# Patient Record
Sex: FEMALE | Race: BLACK OR AFRICAN AMERICAN | NOT HISPANIC OR LATINO | Employment: UNEMPLOYED
[De-identification: names, ages, dates, MRNs, and addresses within clinical notes are randomized per-mention and may not be internally consistent; named-entity substitution may affect disease eponyms.]

---

## 2017-09-17 ENCOUNTER — HEALTH MAINTENANCE LETTER (OUTPATIENT)
Age: 22
End: 2017-09-17

## 2019-04-25 ENCOUNTER — TRANSFERRED RECORDS (OUTPATIENT)
Dept: HEALTH INFORMATION MANAGEMENT | Facility: CLINIC | Age: 24
End: 2019-04-25

## 2019-04-25 LAB — PAP SMEAR - HIM PATIENT REPORTED: NEGATIVE

## 2019-06-05 ENCOUNTER — TELEPHONE (OUTPATIENT)
Dept: OBGYN | Facility: CLINIC | Age: 24
End: 2019-06-05

## 2019-06-05 NOTE — TELEPHONE ENCOUNTER
Pt no showed NPN nurse visit today.    Has appt with Dr Delgadillo scheduled for 6/10/19.     We have not received records as far as I know.    Please call her to reschedule nurse visit, and we HAVE to get records prior or at visit.    Mari WANG R.N.  [facility]

## 2019-06-13 ENCOUNTER — TELEPHONE (OUTPATIENT)
Dept: OBGYN | Facility: CLINIC | Age: 24
End: 2019-06-13

## 2019-06-13 ENCOUNTER — TRANSFERRED RECORDS (OUTPATIENT)
Dept: HEALTH INFORMATION MANAGEMENT | Facility: CLINIC | Age: 24
End: 2019-06-13

## 2019-06-13 DIAGNOSIS — B20 HUMAN IMMUNODEFICIENCY VIRUS (HIV) DISEASE (H): Primary | ICD-10-CM

## 2019-06-13 DIAGNOSIS — Z33.1 PREGNANT STATE, INCIDENTAL: ICD-10-CM

## 2019-06-13 RX ORDER — ABACAVIR AND LAMIVUDINE 600; 300 MG/1; MG/1
1 TABLET, FILM COATED ORAL DAILY
Qty: 90 TABLET | Refills: 1 | Status: SHIPPED | OUTPATIENT
Start: 2019-06-13 | End: 2019-06-27

## 2019-06-13 NOTE — TELEPHONE ENCOUNTER
Referral placed. Left voicemail for patient to call back as soon as possible.    Melany Hoyos RN

## 2019-06-13 NOTE — TELEPHONE ENCOUNTER
Pt called back. Discussed in length with her about her upcoming appts with us, and the importance of her getting in to see the infectious disease doctors ASAP. The pt will call the clinic and let us know if she is not able to get in within a week.    Pt also says she will call her old OB clinic to get her records faxed to us. Gave her the fax number for the [facility] clinic. Told her we NEED those records in order to see her at her nurse visit next week. She is agreeable. Will watch for those records. Routed this encounter to Joycelyn to let her know.        Melany Hoyos RN

## 2019-06-13 NOTE — TELEPHONE ENCOUNTER
RN from [place] infectious disease [facility] calling stating this pt is HIV positive. She is seeing us for her NPN nurse visit next week. The pt has been taking medications for this, but just ran out. It was previously prescribed by her infectious disease doctor in [place]. Her doctor tried to send it to [facility] in [place], but that provider is now out of her insurance network. The RN is hoping her OB can start prescribing this medication for her since she has ran out. Medication orders pended as told by the infectious disease RN.    The pt took Isentris in the first trimester instead of Tivicay as Tivicay is contraindicated in the first trimester. Per the RN, the pt is about 21 weeks now.     Pt was diagnosed with HIV in 6/2016.    The RN will be faxing the pt's infectious disease records to our [place] clinic.        Melany Hoyos RN

## 2019-06-13 NOTE — TELEPHONE ENCOUNTER
This patient needs to establish with HIV clinic at the [facility]. Please get her an appointment right away.    Melanie Andres MD

## 2019-06-13 NOTE — TELEPHONE ENCOUNTER
Please also make sure that an HIV RNA viral load is added to her routine prenatal labs. Thanks.  Melanie Andres MD

## 2019-06-17 PROBLEM — O09.899 HIV (HUMAN IMMUNODEFICIENCY VIRUS) RISK FACTORS COMPLICATING PREGNANCY: Status: ACTIVE | Noted: 2019-06-17

## 2019-06-18 NOTE — TELEPHONE ENCOUNTER
Has nurse appt tomorrow.    I may order all OB labs as we got some records but nothing OB related.  Since pt is somewhat non-complaint with appts, it may be easier to just get all her labs and send her for an ultrasound.    Should I defer from ordering HIV lab as we know she is pos, just order the RNA  viral load?    Mari WANG R.N.  [facility]

## 2019-06-19 ENCOUNTER — PRENATAL OFFICE VISIT (OUTPATIENT)
Dept: NURSING | Facility: CLINIC | Age: 24
End: 2019-06-19
Payer: MEDICAID

## 2019-06-19 VITALS
HEIGHT: 68 IN | WEIGHT: 178.9 LBS | SYSTOLIC BLOOD PRESSURE: 102 MMHG | DIASTOLIC BLOOD PRESSURE: 62 MMHG | BODY MASS INDEX: 27.11 KG/M2

## 2019-06-19 DIAGNOSIS — Z34.00 SUPERVISION OF NORMAL FIRST PREGNANCY: Primary | ICD-10-CM

## 2019-06-19 DIAGNOSIS — O09.899: ICD-10-CM

## 2019-06-19 LAB
ABO + RH BLD: NORMAL
ABO + RH BLD: NORMAL
BLD GP AB SCN SERPL QL: NORMAL
BLOOD BANK CMNT PATIENT-IMP: NORMAL
ERYTHROCYTE [DISTWIDTH] IN BLOOD BY AUTOMATED COUNT: 13.6 % (ref 10–15)
HCG UR QL: POSITIVE
HCT VFR BLD AUTO: 30.1 % (ref 35–47)
HGB BLD-MCNC: 10.2 G/DL (ref 11.7–15.7)
MCH RBC QN AUTO: 28.6 PG (ref 26.5–33)
MCHC RBC AUTO-ENTMCNC: 33.9 G/DL (ref 31.5–36.5)
MCV RBC AUTO: 84 FL (ref 78–100)
PLATELET # BLD AUTO: 283 10E9/L (ref 150–450)
RBC # BLD AUTO: 3.57 10E12/L (ref 3.8–5.2)
SPECIMEN EXP DATE BLD: NORMAL
WBC # BLD AUTO: 6.2 10E9/L (ref 4–11)

## 2019-06-19 PROCEDURE — 86900 BLOOD TYPING SEROLOGIC ABO: CPT | Performed by: OBSTETRICS & GYNECOLOGY

## 2019-06-19 PROCEDURE — 86701 HIV-1ANTIBODY: CPT | Performed by: OBSTETRICS & GYNECOLOGY

## 2019-06-19 PROCEDURE — 86901 BLOOD TYPING SEROLOGIC RH(D): CPT | Performed by: OBSTETRICS & GYNECOLOGY

## 2019-06-19 PROCEDURE — 81025 URINE PREGNANCY TEST: CPT | Performed by: OBSTETRICS & GYNECOLOGY

## 2019-06-19 PROCEDURE — 85027 COMPLETE CBC AUTOMATED: CPT | Performed by: OBSTETRICS & GYNECOLOGY

## 2019-06-19 PROCEDURE — 86850 RBC ANTIBODY SCREEN: CPT | Performed by: OBSTETRICS & GYNECOLOGY

## 2019-06-19 PROCEDURE — 87389 HIV-1 AG W/HIV-1&-2 AB AG IA: CPT | Performed by: OBSTETRICS & GYNECOLOGY

## 2019-06-19 PROCEDURE — 99207 ZZC NO CHARGE NURSE ONLY: CPT

## 2019-06-19 PROCEDURE — 86702 HIV-2 ANTIBODY: CPT | Performed by: OBSTETRICS & GYNECOLOGY

## 2019-06-19 PROCEDURE — 87536 HIV-1 QUANT&REVRSE TRNSCRPJ: CPT | Performed by: OBSTETRICS & GYNECOLOGY

## 2019-06-19 PROCEDURE — 87086 URINE CULTURE/COLONY COUNT: CPT | Performed by: OBSTETRICS & GYNECOLOGY

## 2019-06-19 PROCEDURE — 86762 RUBELLA ANTIBODY: CPT | Mod: XU | Performed by: OBSTETRICS & GYNECOLOGY

## 2019-06-19 PROCEDURE — G0499 HEPB SCREEN HIGH RISK INDIV: HCPCS | Performed by: OBSTETRICS & GYNECOLOGY

## 2019-06-19 PROCEDURE — 36415 COLL VENOUS BLD VENIPUNCTURE: CPT | Performed by: OBSTETRICS & GYNECOLOGY

## 2019-06-19 PROCEDURE — 86780 TREPONEMA PALLIDUM: CPT | Performed by: OBSTETRICS & GYNECOLOGY

## 2019-06-19 RX ORDER — LANOLIN ALCOHOL/MO/W.PET/CERES
400 CREAM (GRAM) TOPICAL DAILY
Qty: 100 TABLET | Refills: 3 | Status: SHIPPED | OUTPATIENT
Start: 2019-06-19 | End: 2020-01-03

## 2019-06-19 ASSESSMENT — MIFFLIN-ST. JEOR: SCORE: 1609.99

## 2019-06-19 NOTE — TELEPHONE ENCOUNTER
Advise Pt that virtually all PNVs have Ca and/or Fe so I sent Rx Folic acid 400 mcg daily to her pharm, since that is the most important vitamin in pregnancy.  We can address anemia later on if results in Hgb < 10.
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
## 2019-06-19 NOTE — TELEPHONE ENCOUNTER
Since we don't yet have any records, go ahead and order all the OB labs, including the regular HIV as well as the HIV viral load, and the U/S.

## 2019-06-19 NOTE — TELEPHONE ENCOUNTER
Pt wanted me to send PNV to pharmacy.  When I added order for one with iron it mentions that iron can lower levels of one of her antivirals.    Please send appropriate PNV to listed pharmacy.    Mari WANG R.N.  [facility]

## 2019-06-20 ENCOUNTER — ANCILLARY PROCEDURE (OUTPATIENT)
Dept: ULTRASOUND IMAGING | Facility: CLINIC | Age: 24
End: 2019-06-20
Payer: MEDICAID

## 2019-06-20 DIAGNOSIS — Z34.00 SUPERVISION OF NORMAL FIRST PREGNANCY: ICD-10-CM

## 2019-06-20 LAB
HBV SURFACE AG SERPL QL IA: NONREACTIVE
RUBV IGG SERPL IA-ACNC: 9 IU/ML
T PALLIDUM AB SER QL: NONREACTIVE

## 2019-06-20 PROCEDURE — 76805 OB US >/= 14 WKS SNGL FETUS: CPT | Performed by: OBSTETRICS & GYNECOLOGY

## 2019-06-21 ENCOUNTER — TELEPHONE (OUTPATIENT)
Dept: OBGYN | Facility: CLINIC | Age: 24
End: 2019-06-21

## 2019-06-21 DIAGNOSIS — Z21 ASYMPTOMATIC HUMAN IMMUNODEFICIENCY VIRUS (HIV) INFECTION STATUS (H): Chronic | ICD-10-CM

## 2019-06-21 LAB
BACTERIA SPEC CULT: NORMAL
HIV 1 & 2 AB SERPL IA.RAPID: NONREACTIVE
HIV 1 & 2 AB SERPL IA.RAPID: REACTIVE
HIV 1+2 AB+HIV1 P24 AG SERPL QL IA: REACTIVE
HIV 1+2 AB+HIV1P24 AG SERPLBLD IA.RAPID: ABNORMAL
HIV1 RNA # PLAS NAA DL=20: NORMAL {COPIES}/ML
HIV1 RNA SERPL NAA+PROBE-LOG#: NORMAL {LOG_COPIES}/ML
SPECIMEN SOURCE: NORMAL

## 2019-06-21 NOTE — TELEPHONE ENCOUNTER
[facility] lab calling. They are reporting a critical lab of reactive HIV 1 assay. This pt has been known to be HIV positive. Routed to Dr Ramirez as an FYI.        Melany Hoyos RN

## 2019-06-23 PROBLEM — O43.199 MARGINAL INSERTION OF UMBILICAL CORD AFFECTING MANAGEMENT OF MOTHER: Status: ACTIVE | Noted: 2019-06-23

## 2019-06-24 ENCOUNTER — PRENATAL OFFICE VISIT (OUTPATIENT)
Dept: OBGYN | Facility: CLINIC | Age: 24
End: 2019-06-24
Payer: MEDICAID

## 2019-06-24 VITALS — DIASTOLIC BLOOD PRESSURE: 60 MMHG | SYSTOLIC BLOOD PRESSURE: 110 MMHG | WEIGHT: 179 LBS | BODY MASS INDEX: 27.22 KG/M2

## 2019-06-24 DIAGNOSIS — O09.892 HIV RISK FACTORS AFFECTING PREGNANCY IN SECOND TRIMESTER: Primary | ICD-10-CM

## 2019-06-24 DIAGNOSIS — O43.199 MARGINAL INSERTION OF UMBILICAL CORD AFFECTING MANAGEMENT OF MOTHER: ICD-10-CM

## 2019-06-24 DIAGNOSIS — Z11.3 SCREEN FOR STD (SEXUALLY TRANSMITTED DISEASE): ICD-10-CM

## 2019-06-24 PROCEDURE — 99201 ZZC OFFICE/OUTPT VISIT, NEW, LEVEL I: CPT | Performed by: OBSTETRICS & GYNECOLOGY

## 2019-06-24 PROCEDURE — 87591 N.GONORRHOEAE DNA AMP PROB: CPT | Performed by: OBSTETRICS & GYNECOLOGY

## 2019-06-24 PROCEDURE — 87491 CHLMYD TRACH DNA AMP PROBE: CPT | Performed by: OBSTETRICS & GYNECOLOGY

## 2019-06-24 NOTE — NURSING NOTE
"Chief Complaint   Patient presents with     Prenatal Care       Initial /60   Wt 81.2 kg (179 lb)   LMP 2019   BMI 27.22 kg/m   Estimated body mass index is 27.22 kg/m  as calculated from the following:    Height as of 19: 1.727 m (5' 8\").    Weight as of this encounter: 81.2 kg (179 lb).  BP completed using cuff size: regular    Questioned patient about current smoking habits.  Pt. has never smoked.          The following HM Due: pap smear      The following patient reported/Care Every where data was sent to:  P ABSTRACT QUALITY INITIATIVES [[id number]]        22w0d    Kanwal Moore [facility]                 "

## 2019-06-24 NOTE — PROGRESS NOTES
Pt is MARVIN from OB in [place].  Pt's prenatal labs are in CareEverywhere and were reviewed.  She also had bloodwork repeated here.  Pap 4/25/19 in [place] was normal.  GC/Chlam needed.  Pt has preg complicated by known HIV Pos, and has undetectable viral load while on her retrovirals.  She was managed by ID in [place], but now is moved back to [place] and needs referral to continue Rx.  Her EDC was by LMP confirmed by 7 week U/S in [place].  U/S here at 21 weeks was normal except marginal cord insertion.  Of note, Pt is leaving to go back to [place] to complete a 3-week course, leaving 7/5, and not returning to [place] until 7/28.    Encounter Diagnoses   Name Primary?     HIV risk factors affecting pregnancy in second trimester Yes     Marginal insertion of umbilical cord affecting management of mother      Screen for STD (sexually transmitted disease)      For above risk factors, will refer to ID here in [place], as well as MFM for marginal cord insertion and HIV pos status.  Will try to get Pt in to see both consults before she leaves on 7/5.    GC/Chlam done.  Pelvic exam normal.    RTC 6 weeks.  28 week labs, Rubella titer repeat, and TDaP at next visit.    Damien Ramirez MD

## 2019-06-26 LAB
C TRACH DNA SPEC QL NAA+PROBE: NEGATIVE
N GONORRHOEA DNA SPEC QL NAA+PROBE: NEGATIVE
SPECIMEN SOURCE: NORMAL
SPECIMEN SOURCE: NORMAL

## 2019-06-27 ENCOUNTER — OFFICE VISIT (OUTPATIENT)
Dept: FAMILY MEDICINE | Facility: CLINIC | Age: 24
End: 2019-06-27
Payer: MEDICAID

## 2019-06-27 VITALS
TEMPERATURE: 98 F | HEART RATE: 95 BPM | DIASTOLIC BLOOD PRESSURE: 63 MMHG | RESPIRATION RATE: 16 BRPM | OXYGEN SATURATION: 100 % | WEIGHT: 181.4 LBS | SYSTOLIC BLOOD PRESSURE: 95 MMHG | HEIGHT: 67 IN | BODY MASS INDEX: 28.47 KG/M2

## 2019-06-27 DIAGNOSIS — B20 HUMAN IMMUNODEFICIENCY VIRUS (HIV) DISEASE (H): ICD-10-CM

## 2019-06-27 DIAGNOSIS — J45.41 MODERATE PERSISTENT ASTHMA WITH ACUTE EXACERBATION: ICD-10-CM

## 2019-06-27 DIAGNOSIS — J44.89 CHRONIC OBSTRUCTIVE AIRWAY DISEASE WITH ASTHMA (H): ICD-10-CM

## 2019-06-27 DIAGNOSIS — Z21 ASYMPTOMATIC HUMAN IMMUNODEFICIENCY VIRUS (HIV) INFECTION STATUS (H): Primary | ICD-10-CM

## 2019-06-27 PROCEDURE — 99214 OFFICE O/P EST MOD 30 MIN: CPT | Performed by: FAMILY MEDICINE

## 2019-06-27 RX ORDER — ALBUTEROL SULFATE 90 UG/1
2 AEROSOL, METERED RESPIRATORY (INHALATION) 4 TIMES DAILY PRN
Qty: 2 INHALER | Refills: 4 | Status: SHIPPED | OUTPATIENT
Start: 2019-06-27 | End: 2020-03-26

## 2019-06-27 RX ORDER — ABACAVIR AND LAMIVUDINE 600; 300 MG/1; MG/1
1 TABLET, FILM COATED ORAL DAILY
Qty: 90 TABLET | Refills: 1 | Status: ON HOLD | OUTPATIENT
Start: 2019-06-27 | End: 2019-10-29

## 2019-06-27 ASSESSMENT — ASTHMA QUESTIONNAIRES
QUESTION_5 LAST FOUR WEEKS HOW WOULD YOU RATE YOUR ASTHMA CONTROL: NOT CONTROLLED AT ALL
QUESTION_4 LAST FOUR WEEKS HOW OFTEN HAVE YOU USED YOUR RESCUE INHALER OR NEBULIZER MEDICATION (SUCH AS ALBUTEROL): THREE OR MORE TIMES PER DAY
QUESTION_2 LAST FOUR WEEKS HOW OFTEN HAVE YOU HAD SHORTNESS OF BREATH: MORE THAN ONCE A DAY
ACT_TOTALSCORE: 5
QUESTION_1 LAST FOUR WEEKS HOW MUCH OF THE TIME DID YOUR ASTHMA KEEP YOU FROM GETTING AS MUCH DONE AT WORK, SCHOOL OR AT HOME: ALL OF THE TIME
EMERGENCY_ROOM_LAST_YEAR_TOTAL: THREE OR MORE
QUESTION_3 LAST FOUR WEEKS HOW OFTEN DID YOUR ASTHMA SYMPTOMS (WHEEZING, COUGHING, SHORTNESS OF BREATH, CHEST TIGHTNESS OR PAIN) WAKE YOU UP AT NIGHT OR EARLIER THAN USUAL IN THE MORNING: FOUR OR MORE NIGHTS A WEEK

## 2019-06-27 ASSESSMENT — PATIENT HEALTH QUESTIONNAIRE - PHQ9
10. IF YOU CHECKED OFF ANY PROBLEMS, HOW DIFFICULT HAVE THESE PROBLEMS MADE IT FOR YOU TO DO YOUR WORK, TAKE CARE OF THINGS AT HOME, OR GET ALONG WITH OTHER PEOPLE: VERY DIFFICULT
SUM OF ALL RESPONSES TO PHQ QUESTIONS 1-9: 15
SUM OF ALL RESPONSES TO PHQ QUESTIONS 1-9: 15

## 2019-06-27 ASSESSMENT — MIFFLIN-ST. JEOR: SCORE: 1605.46

## 2019-06-27 NOTE — PROGRESS NOTES
Subjective     Priscila Betancourt is a 24 year old female who presents to clinic today for the following health issues:    Has had increased asthma this year, wheeze and cough advair and prn albuterol , is under Pregnancy Care at 24 weeks with known HIV+  History of Present Illness      Asthma:  She presents for follow up of asthma.  She has some cough, some wheezing, and some shortness of breath. She is using a relief medication every 4 hours. She does not miss any doses of her controller medication throughout the week.Patient is aware of the following triggers: animal dander, cold air, dust mites, emotions, exercise or sports, humidity, pollens, smoke, strong odors and fumes and upper respiratory infections. The patient has not had a visit to the Emergency Room, Urgent Care or Hospital due to asthma since the last clinic visit.     Back Pain:  She presents for follow up of back pain. Patient's back pain is a chronic problem.  Location of back pain:  Right lower back, left lower back, right middle of back, left middle of back, right hip, left hip, right side of waist and left side of waist  Description of back pain: burning and stabbing  Back pain spreads: nowhere    Since patient first noticed back pain, pain is: rapidly worsening  Does back pain interfere with her job:  Yes  She consumes 1 sweetened beverage(s) daily.  She is taking medications regularly.     Asthma Follow-Up    Was ACT completed today?    Yes    ACT Total Scores 6/27/2019   ACT TOTAL SCORE -   ASTHMA ER VISITS -   ASTHMA HOSPITALIZATIONS -   ACT TOTAL SCORE (Goal Greater than or Equal to 20) 5   In the past 12 months, how many times did you visit the emergency room for your asthma without being admitted to the hospital? 3   In the past 12 months, how many times were you hospitalized overnight because of your asthma? 0       How many days per week do you miss taking your asthma controller medication?  1    Please describe any recent triggers for your  asthma: smoke, upper respiratory infections, dust mites, pollens, animal dander, mold, humidity, strong odors and fumes, occupational exposure and exercise or sports    Have you had any Emergency Room Visits, Urgent Care Visits, or Hospital Admissions since your last office visit?  No        Amount of exercise or physical activity: 2-3 days/week for an average of 30-45 minutes    Problems taking medications regularly: No    Medication side effects: none    Diet: regular (no restrictions)          Reviewed and updated as needed this visit by Provider         Review of Systems   ROS COMP: Constitutional, HEENT, cardiovascular, pulmonary, GI, , musculoskeletal, neuro, skin, endocrine and psych systems are negative, except as otherwise noted.      Objective    LMP 01/21/2019 (Exact Date)   There is no height or weight on file to calculate BMI.  Physical Exam   GENERAL: healthy, alert and no distress  EYES: Eyes grossly normal to inspection, PERRL and conjunctivae and sclerae normal  HENT: ear canals and TM's normal, nose and mouth without ulcers or lesions  NECK: no adenopathy, no asymmetry, masses, or scars and thyroid normal to palpation  RESP: lungs clear to auscultation - no rales, rhonchi or wheezes  BREAST: normal without masses, tenderness or nipple discharge and no palpable axillary masses or adenopathy  CV: regular rate and rhythm, normal S1 S2, no S3 or S4, no murmur, click or rub, no peripheral edema and peripheral pulses strong  ABDOMEN: soft, nontender, no hepatosplenomegaly, no masses and bowel sounds normal  MS: no gross musculoskeletal defects noted, no edema  SKIN: no suspicious lesions or rashes  NEURO: Normal strength and tone, mentation intact and speech normal  PSYCH: mentation appears normal, affect normal/bright    Diagnostic Test Results:  Labs reviewed in Epic        Assessment & Plan   Assessment      Plan  1. Human immunodeficiency virus (HIV) disease (H)    - abacavir-lamiVUDine (EPZICOM)  "600-300 MG tablet; Take 1 tablet by mouth daily  Dispense: 90 tablet; Refill: 1  - dolutegravir (TIVICAY) 50 MG tablet; Take 1 tablet (50 mg) by mouth daily  Dispense: 90 tablet; Refill: 1    2. Moderate persistent asthma  Med review     3. Asymptomatic human immunodeficiency virus (HIV) infection status (H)      4. Chronic obstructive airway disease with asthma (H)    - fluticasone-salmeterol (ADVAIR DISKUS) 250-50 MCG/DOSE inhaler; Inhale 1 puff into the lungs 2 times daily  Dispense: 1 Inhaler; Refill: 5  - albuterol (PROAIR HFA/PROVENTIL HFA/VENTOLIN HFA) 108 (90 Base) MCG/ACT inhaler; Inhale 2 puffs into the lungs 4 times daily as needed for shortness of breath / dyspnea  Dispense: 2 Inhaler; Refill: 4    BMI:   Estimated body mass index is 28.41 kg/m  as calculated from the following:    Height as of this encounter: 1.702 m (5' 7\").    Weight as of this encounter: 82.3 kg (181 lb 6.4 oz).           OB GYN,   ID in [place]    Return in about 3 months (around 9/27/2019).    Angel Jon MD  [facility]            "

## 2019-06-28 ENCOUNTER — PRE VISIT (OUTPATIENT)
Dept: MATERNAL FETAL MEDICINE | Facility: CLINIC | Age: 24
End: 2019-06-28

## 2019-06-28 ASSESSMENT — ASTHMA QUESTIONNAIRES: ACT_TOTALSCORE: 5

## 2019-06-28 ASSESSMENT — PATIENT HEALTH QUESTIONNAIRE - PHQ9: SUM OF ALL RESPONSES TO PHQ QUESTIONS 1-9: 15

## 2019-07-05 ENCOUNTER — OFFICE VISIT (OUTPATIENT)
Dept: MATERNAL FETAL MEDICINE | Facility: CLINIC | Age: 24
End: 2019-07-05
Attending: OBSTETRICS & GYNECOLOGY
Payer: MEDICAID

## 2019-07-05 ENCOUNTER — HOSPITAL ENCOUNTER (OUTPATIENT)
Dept: ULTRASOUND IMAGING | Facility: CLINIC | Age: 24
Discharge: HOME | End: 2019-07-05
Attending: OBSTETRICS & GYNECOLOGY | Admitting: OBSTETRICS & GYNECOLOGY
Payer: MEDICAID

## 2019-07-05 DIAGNOSIS — O26.90 PREGNANCY RELATED CONDITION, ANTEPARTUM: ICD-10-CM

## 2019-07-05 DIAGNOSIS — O99.512 ASTHMA AFFECTING PREGNANCY IN SECOND TRIMESTER: ICD-10-CM

## 2019-07-05 DIAGNOSIS — O09.892 HIV RISK FACTORS AFFECTING PREGNANCY IN SECOND TRIMESTER: Primary | ICD-10-CM

## 2019-07-05 DIAGNOSIS — J45.909 ASTHMA AFFECTING PREGNANCY IN SECOND TRIMESTER: ICD-10-CM

## 2019-07-05 DIAGNOSIS — O43.199 MARGINAL INSERTION OF UMBILICAL CORD AFFECTING MANAGEMENT OF MOTHER: ICD-10-CM

## 2019-07-05 PROCEDURE — 76811 OB US DETAILED SNGL FETUS: CPT

## 2019-07-05 NOTE — PROGRESS NOTES
RE: Priscila Betancourt  : 1995  MRUN: [id number]    2019    Dear Dr. Ramirez,    Thank you for referring your patient Ms. Betancourt for a Maternal-Fetal Medicine consultation today.  As you know, she is a 24 year old  at 23 weeks and 4 days gestation with an estimated date of delivery of Oct 28, 2019 by LMP consistent with 7 week ultrasound.  She came to me today to discuss recommendations as she is HIV positive.   She also has asthma.  Today she feels well.  She denies contractions, leakage of fluid and vaginal bleeding.      Priscila was diagnosed with HIV in [place], 2016.  She presumably contracted it from heterosexual intercourse in [facility].  She has been on antiretrovirals since .  Her viral load at the time of diagnosis was 15,1000 with a CD4 count of 352.  She is followed by Dr. Jessika Garcia in [place] and was last seen 3/20/19.  These records are available in care everywhere.  Since moving back to [place] she has not established care with an infectious disease physician.  Her most recent viral load was undetectable (19).  The most recent CD4 count that I see is 637 (18).  Priscila is currently on Epzicom (abacavir-lamivudine) and dolutegravir (tivicay).      She has been vaccinated for hepatitis A and B as well as pneumococcus and meningococcus (see care everywhere).   She tested negative for TB (Quantiferon gold) and Hepatitis C in .      Priscila also has asthma.  She is on albuterol and fluticasone-salmeterol (Advair).  She reports multiple ER visits for her asthma, most recently in January.  She has never had to spend the night in the hospital.  She has noticed her asthma getting worse in the pregnancy.  She recently had her Advair refilled, prior to that she was using her albuterol daily.  She is currently using her albuterol about twice a week.  Triggers for her include cold weather and allergens such as animals.      Gynecological History:    Menarche: 12 years-old / Frequency:  regular every month / Duration:  3-5 days,    She denies a history of myomas, endometriosis, or abnormal Pap tests.    Medical History:   Diagnosis Date     Asthma      Human immunodeficiency virus (HIV) infection status (H)      Surgical History:   Past Surgical History:   Procedure Laterality Date     COLONOSCOPY      Rectal bleeding due to chronic constipation     Medications:      abacavir-lamivudine (EPZICOM) 600-300 MG tablet, Take 1 tablet by mouth daily     albuterol (2.5 MG/3ML) 0.083% nebulizer solution, Take 3 mLs by nebulization every 6 hours as needed for shortness of breath / dyspnea     albuterol (PROAIR HFA/PROVENTIL HFA/VENTOLIN HFA) 108 (90 Base) MCG/ACT inhaler, Inhale 2 puffs into the lungs 4 times daily as needed for shortness of breath / dyspnea     dolutegravir (TIVICAY) 50 MG tablet, Take 1 tablet (50 mg) by mouth daily     fluticasone-salmeterol (ADVAIR DISKUS) 250-50 MCG/DOSE inhaler, Inhale 1 puff into the lungs 2 times daily     folic acid (FOLVITE) 400 MCG tablet, Take 1 tablet (400 mcg) by mouth daily     Prenatal MV-Min-FA-Omega-3 (PRENATAL GUMMIES/DHA & FA PO)    Allergies:   No Known Allergies    Social History:    She  reports that she has never smoked. She has never used smokeless tobacco. She reports that she does not drink alcohol or use drugs.    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       Partner History:    She denies that he has a family history of motor/intellectual impairment, stillbirth, genetic or chromosome abnormalities or congenital anomalies.       Review of Systems:    Negative except as per HPI    Data Reviewed:      Height 5 feet 8 inches; Weight: ; BMI:27.22 kg/m2    The remaining prenatal laboratory results are not  available for the review during the consultation.    Ultrasound:    Anatomy ultrasound at [facility] on 19  o See imaging tab    Physical examination was deferred at this time.    In light of the patient s history as listed above my recommendations can be summarized briefly as follows:    Human Immunodeficiency Virus (HIV)    Substantial decreases in  transmission in the [place] and other industrialized countries have been observed following incorporation of Highly Active Anti-Retroviral Therapy (HAART) regimen into clinical practice.      The initial evaluation of women with HIV include assessment of the status of the patient's disease, recommendations about beginning or altering antiretroviral treatment, and discussion of interventions to reduce the risk of  HIV transmission. This includes evaluation of the degree of existing immunodeficiency determined by CD4 cell count, risk of disease progression determined by plasma viral load count and a review of prior or current antiretroviral therapy.  There should be an assessment of the need for prophylaxis against HIV-related illnesses.      Scheduled  delivery should be discussed and recommended for all HIV-infected pregnant women with viral loads above 1000 copies/mL near delivery.  In these patients, intrapartum zidovudine (ZDV) prophylaxis should be provided regardless of the mode of delivery. Intravenous ZDV should begin three hours prior to surgery.      In women at very low risk for transmission, such as those with low or undetectable viral load (like Priscila), the additional benefit provided by elective  delivery and ZDV administrations may be marginal. The potential benefits of these measures were discussed.  It may be appropriate to withhold ZDV around the time of delivery in some patients with low viral loads (i.e. <1000 copies/mL), according to current CDC and hospital policies.  Some experts have expressed concern that  there are inadequate data to determine whether intrapartum ZDV provides additional benefit (ACOG ) and this was discussed with Priscila who will consider this option.    Asthma    Asthma is a very common potentially serious medical complication of pregnancy.  Those with severe asthma are at the greatest risk for exacerbations and complications during pregnancy. Severe and poorly controlled asthma may be associated with increased risks of  birth, need for  delivery, preeclampsia, growth restriction, and maternal morbidity and mortality.  In general, the risk of asthma exacerbation is much greater to both mother and fetus than the risk of medication exposure.  Therefore medications should be continued as prescribed to optimize the patient s pulmonary status.      Recommendations    Priscila was encouraged to make an appointment with an infectious disease provider here in [place].      Communication with the [facility], they have support services for pregnant women with HIV and offer assistance with coordination of care.    Continue HAART throughout pregnancy and during admission for labor.    Serial measurement of CD4 cell count and HIV RNA levels to determine need for antiretroviral therapy for treatment of maternal HIV disease or alterations in such therapy, and/or initiation of prophylaxis against PCP or MAC. Those with non-detectable viral loads can be tested every 2-3 months.  In patients with abnormal CD4 counts or elevated viral loads, serial monitoring of CD4 count and viral load every 4-6 weeks may be necessary.      Patients with CD4 counts <200 should be given prophylaxis for PCP, toxoplasmosis, and/or MAC as appropriate; this is not needed for Nycristil at this time.    LFTs and CBCs should be checked monthly in the 3rd trimester.    Vaccination for influenza should be considered once available in the fall.    Serial ultrasounds for growth should be performed every 4-6 weeks,  or more often as clinically indicated.     fetal testing is reserved for the usual obstetric indications    Mode of delivery should be discussed, based on viral load and obstetrical indications, at 32-34 weeks of gestation.    If the decision is made to perform an elective  delivery, ACOG recommends it be done at 38 weeks gestation, due to the potential risk for labor and membrane rupture before the woman would reach 39 weeks.    If vaginal delivery is planned invasive procedures should be avoided (i.e. FSE)    Pediatrics should be notified before delivery.    Refrain from breastfeeding to avoid  transmission of HIV to their infants through breast milk.    Continue current inhalers.     Check peak flow rate to monitor baseline functional status; create an action plan based on relative peak flow rates.    Avoid any known asthma triggers.    Influenza immunization (October to March).    Avoid prostaglandins such as E2 (Prostin) and F2-alpha (carboprost/Hemabate).     At the end of our discussion, Ms. Betancourt indicated that her questions were answered and she seemed satisfied with our discussion.  Thank you for the opportunity to participate in your patient s care.  If I can be of any further assistance, please do not hesitate to contact me.    Sincerely,    Kayla Morley MD  , OB/GYN  Maternal-Fetal Medicine  [email or web address]  [phone] (Academic office)  [phone] (Pager)       I spent a total of 30 minutes face to face with Priscila Betancourt during today's visit.  Over 50% of this time was spent in counseling the patient and/or coordinating care.

## 2019-08-02 ENCOUNTER — OFFICE VISIT (OUTPATIENT)
Dept: MATERNAL FETAL MEDICINE | Facility: CLINIC | Age: 24
End: 2019-08-02
Attending: OBSTETRICS & GYNECOLOGY
Payer: COMMERCIAL

## 2019-08-02 ENCOUNTER — HOSPITAL ENCOUNTER (OUTPATIENT)
Dept: ULTRASOUND IMAGING | Facility: CLINIC | Age: 24
Discharge: HOME | End: 2019-08-02
Attending: OBSTETRICS & GYNECOLOGY | Admitting: OBSTETRICS & GYNECOLOGY
Payer: COMMERCIAL

## 2019-08-02 DIAGNOSIS — O99.512 ASTHMA AFFECTING PREGNANCY IN SECOND TRIMESTER: ICD-10-CM

## 2019-08-02 DIAGNOSIS — O43.199 MARGINAL INSERTION OF UMBILICAL CORD AFFECTING MANAGEMENT OF MOTHER: ICD-10-CM

## 2019-08-02 DIAGNOSIS — O09.892 HIV RISK FACTORS AFFECTING PREGNANCY IN SECOND TRIMESTER: ICD-10-CM

## 2019-08-02 DIAGNOSIS — O35.EXX0 PREGNANCY AFFECTED BY GENITOURINARY ABNORMALITY OF FETUS, SINGLE OR UNSPECIFIED FETUS: Primary | ICD-10-CM

## 2019-08-02 DIAGNOSIS — J45.909 ASTHMA AFFECTING PREGNANCY IN SECOND TRIMESTER: ICD-10-CM

## 2019-08-02 PROCEDURE — 76816 OB US FOLLOW-UP PER FETUS: CPT

## 2019-08-13 ENCOUNTER — PRENATAL OFFICE VISIT (OUTPATIENT)
Dept: OBGYN | Facility: CLINIC | Age: 24
End: 2019-08-13
Payer: COMMERCIAL

## 2019-08-13 VITALS — WEIGHT: 187.1 LBS | BODY MASS INDEX: 29.3 KG/M2 | SYSTOLIC BLOOD PRESSURE: 102 MMHG | DIASTOLIC BLOOD PRESSURE: 62 MMHG

## 2019-08-13 DIAGNOSIS — O09.90 HIGH-RISK PREGNANCY, UNSPECIFIED TRIMESTER: Primary | ICD-10-CM

## 2019-08-13 DIAGNOSIS — O09.892 HIV RISK FACTORS AFFECTING PREGNANCY IN SECOND TRIMESTER: ICD-10-CM

## 2019-08-13 DIAGNOSIS — O43.199 MARGINAL INSERTION OF UMBILICAL CORD AFFECTING MANAGEMENT OF MOTHER: ICD-10-CM

## 2019-08-13 LAB
ERYTHROCYTE [DISTWIDTH] IN BLOOD BY AUTOMATED COUNT: 13.9 % (ref 10–15)
HCT VFR BLD AUTO: 28.8 % (ref 35–47)
HGB BLD-MCNC: 9.5 G/DL (ref 11.7–15.7)
MCH RBC QN AUTO: 27.7 PG (ref 26.5–33)
MCHC RBC AUTO-ENTMCNC: 33 G/DL (ref 31.5–36.5)
MCV RBC AUTO: 84 FL (ref 78–100)
PLATELET # BLD AUTO: 311 10E9/L (ref 150–450)
RBC # BLD AUTO: 3.43 10E12/L (ref 3.8–5.2)
WBC # BLD AUTO: 6.7 10E9/L (ref 4–11)

## 2019-08-13 PROCEDURE — 82950 GLUCOSE TEST: CPT | Performed by: OBSTETRICS & GYNECOLOGY

## 2019-08-13 PROCEDURE — 86780 TREPONEMA PALLIDUM: CPT | Performed by: OBSTETRICS & GYNECOLOGY

## 2019-08-13 PROCEDURE — 90715 TDAP VACCINE 7 YRS/> IM: CPT | Performed by: OBSTETRICS & GYNECOLOGY

## 2019-08-13 PROCEDURE — 86762 RUBELLA ANTIBODY: CPT | Performed by: OBSTETRICS & GYNECOLOGY

## 2019-08-13 PROCEDURE — 85027 COMPLETE CBC AUTOMATED: CPT | Performed by: OBSTETRICS & GYNECOLOGY

## 2019-08-13 PROCEDURE — 36415 COLL VENOUS BLD VENIPUNCTURE: CPT | Performed by: OBSTETRICS & GYNECOLOGY

## 2019-08-13 PROCEDURE — 90471 IMMUNIZATION ADMIN: CPT | Performed by: OBSTETRICS & GYNECOLOGY

## 2019-08-13 PROCEDURE — 99207 ZZC COMPLICATED OB VISIT: CPT | Performed by: OBSTETRICS & GYNECOLOGY

## 2019-08-13 NOTE — NURSING NOTE
"Chief Complaint   Patient presents with     Prenatal Care     Discuss MFM u/s results   29w1d   GCT / Tdap today    initial /62   Wt 84.9 kg (187 lb 1.6 oz)   LMP 01/21/2019 (Exact Date)   BMI 29.30 kg/m   Estimated body mass index is 29.3 kg/m  as calculated from the following:    Height as of 6/27/19: 1.702 m (5' 7\").    Weight as of this encounter: 84.9 kg (187 lb 1.6 oz).  BP completed using cuff size regular.  Latanya Montes CMA    "

## 2019-08-13 NOTE — PROGRESS NOTES
IUP at 29w1d,  HIV+ on retrovirals with undetectable viral load and right pelvic kidney.    Feels well.  No problems or concerns    GCT/Hgb/Trep and TDaP today.  Rubella equivocal so will recheck.    Has MFM follow up 9/3/19.    RTC 2 weeks

## 2019-08-14 LAB
GLUCOSE 1H P 50 G GLC PO SERPL-MCNC: 100 MG/DL (ref 60–129)
RUBV IGG SERPL IA-ACNC: 9 IU/ML
T PALLIDUM AB SER QL: NONREACTIVE

## 2019-08-16 ENCOUNTER — TELEPHONE (OUTPATIENT)
Dept: OBGYN | Facility: CLINIC | Age: 24
End: 2019-08-16

## 2019-08-16 DIAGNOSIS — D64.9 ANEMIA: Primary | ICD-10-CM

## 2019-08-16 DIAGNOSIS — O99.019 ANEMIA DURING PREGNANCY: ICD-10-CM

## 2019-08-16 DIAGNOSIS — O09.90 HIGH-RISK PREGNANCY, UNSPECIFIED TRIMESTER: ICD-10-CM

## 2019-08-16 RX ORDER — FERROUS SULFATE 325(65) MG
325 TABLET ORAL
Qty: 90 TABLET | Refills: 3 | Status: SHIPPED | OUTPATIENT
Start: 2019-08-16 | End: 2020-01-03

## 2019-08-16 RX ORDER — PRENATAL VIT/IRON FUM/FOLIC AC 27MG-0.8MG
1 TABLET ORAL DAILY
Qty: 100 TABLET | Refills: 3 | Status: SHIPPED | OUTPATIENT
Start: 2019-08-16 | End: 2020-01-03

## 2019-08-16 NOTE — TELEPHONE ENCOUNTER
Advise Pt that she can take the Tivicay with the iron, however per UpToDate, she needs to take both drugs with food.  She should also check with her Inf Disease MD who Rx's Tivicay to confirm this.

## 2019-08-16 NOTE — TELEPHONE ENCOUNTER
Pt calls and asks for both a prenatal with iron and the extra iron that she needs to take be sent to the pharmacy.    Pended, contraindication warning comes up since pt is taking Tivicay.  Okay to send these?      Mari WANG R.N.  [facility]

## 2019-08-28 ENCOUNTER — PRENATAL OFFICE VISIT (OUTPATIENT)
Dept: OBGYN | Facility: CLINIC | Age: 24
End: 2019-08-28
Payer: COMMERCIAL

## 2019-08-28 VITALS — WEIGHT: 189 LBS | BODY MASS INDEX: 29.6 KG/M2 | SYSTOLIC BLOOD PRESSURE: 100 MMHG | DIASTOLIC BLOOD PRESSURE: 70 MMHG

## 2019-08-28 DIAGNOSIS — O09.90 HIGH-RISK PREGNANCY, UNSPECIFIED TRIMESTER: Primary | ICD-10-CM

## 2019-08-28 DIAGNOSIS — O09.892 HIV RISK FACTORS AFFECTING PREGNANCY IN SECOND TRIMESTER: ICD-10-CM

## 2019-08-28 DIAGNOSIS — O99.019 ANEMIA DURING PREGNANCY: ICD-10-CM

## 2019-08-28 PROCEDURE — 99207 ZZC PRENATAL VISIT: CPT | Performed by: OBSTETRICS & GYNECOLOGY

## 2019-08-28 NOTE — PROGRESS NOTES
Having some pressure, slight increase in discharge, no other worrisome symptoms.  +FM, no ctx, no VB or LOF.    24 year old  at 31w2d   - h/o HIV on antiretrovirals with undetectable viral load.  S/p MFM consult, rec no AROM, no FSE, OK for .  They also recommend LFT and CBC q month in 3rd trimester, will order this as well as HIV RNA quant (q2-3 Months) for next visit.  Has been unable to get in with ID; will call on the referral today to see if they can get her in.   - anemia: on po Fe, has never had testing for thalessemia, will add Hgb ELP on to next labs  - fetal pelvic kidney - f/u growth and  scan scheduled on 9/3 with MFM  - RNI: plan MMR Postpartum    RTC 2 weeks    Elizabeth Qureshi MD, MPH  [facility]

## 2019-08-28 NOTE — NURSING NOTE
"Chief Complaint   Patient presents with     Prenatal Care     31 2/7 weeks       Initial /70   Wt 85.7 kg (189 lb)   LMP 2019 (Exact Date)   BMI 29.60 kg/m   Estimated body mass index is 29.6 kg/m  as calculated from the following:    Height as of 19: 1.702 m (5' 7\").    Weight as of this encounter: 85.7 kg (189 lb).  BP completed using cuff size: regular    Questioned patient about current smoking habits.  Pt. has never smoked.          The following HM Due: NONE    Funmilayo Conde CMA    "

## 2019-09-03 ENCOUNTER — PRENATAL OFFICE VISIT (OUTPATIENT)
Dept: OBGYN | Facility: CLINIC | Age: 24
End: 2019-09-03
Payer: COMMERCIAL

## 2019-09-03 ENCOUNTER — HOSPITAL ENCOUNTER (OUTPATIENT)
Dept: ULTRASOUND IMAGING | Facility: CLINIC | Age: 24
Discharge: HOME | End: 2019-09-03
Attending: OBSTETRICS & GYNECOLOGY | Admitting: OBSTETRICS & GYNECOLOGY
Payer: COMMERCIAL

## 2019-09-03 ENCOUNTER — HOSPITAL ENCOUNTER (OUTPATIENT)
Dept: LAB | Facility: CLINIC | Age: 24
End: 2019-09-03
Attending: OBSTETRICS & GYNECOLOGY
Payer: COMMERCIAL

## 2019-09-03 ENCOUNTER — OFFICE VISIT (OUTPATIENT)
Dept: MATERNAL FETAL MEDICINE | Facility: CLINIC | Age: 24
End: 2019-09-03
Attending: OBSTETRICS & GYNECOLOGY
Payer: COMMERCIAL

## 2019-09-03 VITALS — BODY MASS INDEX: 30.07 KG/M2 | SYSTOLIC BLOOD PRESSURE: 100 MMHG | DIASTOLIC BLOOD PRESSURE: 62 MMHG | WEIGHT: 192 LBS

## 2019-09-03 DIAGNOSIS — O09.93 HIGH-RISK PREGNANCY, THIRD TRIMESTER: Primary | ICD-10-CM

## 2019-09-03 DIAGNOSIS — O43.199 MARGINAL INSERTION OF UMBILICAL CORD AFFECTING MANAGEMENT OF MOTHER: ICD-10-CM

## 2019-09-03 DIAGNOSIS — O09.892 HIV RISK FACTORS AFFECTING PREGNANCY IN SECOND TRIMESTER: ICD-10-CM

## 2019-09-03 DIAGNOSIS — N89.8 VAGINAL DISCHARGE: ICD-10-CM

## 2019-09-03 DIAGNOSIS — O09.893 HIV RISK FACTORS AFFECTING PREGNANCY IN THIRD TRIMESTER: Primary | ICD-10-CM

## 2019-09-03 DIAGNOSIS — O35.EXX0 PREGNANCY AFFECTED BY GENITOURINARY ABNORMALITY OF FETUS, SINGLE OR UNSPECIFIED FETUS: ICD-10-CM

## 2019-09-03 LAB
ALT SERPL W P-5'-P-CCNC: 13 U/L (ref 0–50)
AST SERPL W P-5'-P-CCNC: 10 U/L (ref 0–45)
ERYTHROCYTE [DISTWIDTH] IN BLOOD BY AUTOMATED COUNT: 16 % (ref 10–15)
HCT VFR BLD AUTO: 31.7 % (ref 35–47)
HGB BLD-MCNC: 10 G/DL (ref 11.7–15.7)
MCH RBC QN AUTO: 27.4 PG (ref 26.5–33)
MCHC RBC AUTO-ENTMCNC: 31.5 G/DL (ref 31.5–36.5)
MCV RBC AUTO: 87 FL (ref 78–100)
PLATELET # BLD AUTO: 294 10E9/L (ref 150–450)
RBC # BLD AUTO: 3.65 10E12/L (ref 3.8–5.2)
SPECIMEN SOURCE: ABNORMAL
WBC # BLD AUTO: 6.3 10E9/L (ref 4–11)
WET PREP SPEC: ABNORMAL

## 2019-09-03 PROCEDURE — 85027 COMPLETE CBC AUTOMATED: CPT | Performed by: OBSTETRICS & GYNECOLOGY

## 2019-09-03 PROCEDURE — 76816 OB US FOLLOW-UP PER FETUS: CPT

## 2019-09-03 PROCEDURE — 83021 HEMOGLOBIN CHROMOTOGRAPHY: CPT | Performed by: OBSTETRICS & GYNECOLOGY

## 2019-09-03 PROCEDURE — 99207 ZZC PRENATAL VISIT: CPT | Performed by: ADVANCED PRACTICE MIDWIFE

## 2019-09-03 PROCEDURE — 84450 TRANSFERASE (AST) (SGOT): CPT | Performed by: OBSTETRICS & GYNECOLOGY

## 2019-09-03 PROCEDURE — 36415 COLL VENOUS BLD VENIPUNCTURE: CPT | Performed by: OBSTETRICS & GYNECOLOGY

## 2019-09-03 PROCEDURE — 84460 ALANINE AMINO (ALT) (SGPT): CPT | Performed by: OBSTETRICS & GYNECOLOGY

## 2019-09-03 PROCEDURE — 87536 HIV-1 QUANT&REVRSE TRNSCRPJ: CPT | Performed by: OBSTETRICS & GYNECOLOGY

## 2019-09-03 PROCEDURE — 87210 SMEAR WET MOUNT SALINE/INK: CPT | Performed by: ADVANCED PRACTICE MIDWIFE

## 2019-09-03 RX ORDER — CLOTRIMAZOLE 1 %
1 CREAM WITH APPLICATOR VAGINAL AT BEDTIME
Qty: 1 G | Refills: 0 | Status: ON HOLD | OUTPATIENT
Start: 2019-09-03 | End: 2019-10-25

## 2019-09-03 NOTE — PROGRESS NOTES
S: Feels like she has a yeast infection. Vaginal discharge with itching.  Baby active.  Denies uterine cramping, vaginal bleeding or leaking of fluid  O: Vitals: /62 (BP Location: Right arm, Patient Position: Sitting, Cuff Size: Adult Regular)   Wt 87.1 kg (192 lb)   LMP 01/21/2019 (Exact Date)   BMI 30.07 kg/m    BMI= Body mass index is 30.07 kg/m .  Exam:  Constitutional: healthy, alert and no distress  Respiratory: respirations even and unlabored  Gastrointestinal: Abdomen soft, non-tender. Fundus measures appropriate for gestational age. Fetal heart tones hear without difficulty and within normal limits  : Normal external genitalia without lesions, swab collected for wet prep.  Psychiatric: mentation appears normal and affect normal/bright    Results for orders placed or performed in visit on 09/03/19   Wet prep   Result Value Ref Range    Specimen Description Vagina     Wet Prep No Trichomonas seen     Wet Prep No clue cells seen     Wet Prep Yeast seen  Moderate   (A)     Wet Prep WBC'S seen  Moderate        A:     ICD-10-CM    1. High-risk pregnancy, third trimester O09.93    2. HIV risk factors affecting pregnancy in second trimester O09.892 AST     HIV-1 RNA quantitative     CBC with platelets     ALT     Hemoglobin S     CANCELED: AST     CANCELED: HIV-1 RNA quantitative     CANCELED: CBC with platelets     CANCELED: ALT     CANCELED: Hemoglobin S   3. Marginal insertion of umbilical cord affecting management of mother O43.199    4. Vaginal discharge N89.8 Wet prep     clotrimazole (LOTRIMIN) 1 % vaginal cream   P: Patient treated with topical medication for yeast infection.  Discussed plans for labor.   Encouraged patient to call with any questions or concerns.  Return to clinic 2 weeks    NASRA Guo, JOSE ANTONIO

## 2019-09-03 NOTE — PATIENT INSTRUCTIONS
Vaginitis (Vaginal Irritation/Infection)    Vaginitis is very common!  The most common vaginal infections are bacterial vaginosis or yeast. These infections are not sexually transmitted but can be incredibly uncomfortable. Seek care from your midwife if signs or symptoms arise.     Normal vaginal discharge:      Is white, clear, thick or thin (it may change depending on where you are in your cycle)    Does not have a foul odor    The amount of discharge varies    Abnormal discharge/symptoms:       Itching in and around the vagina    Redness, pain or swelling    Discharge that is foamy, greenish, curd like, or bloody    Foul smelling odor    Pain when urinating or having sex    Fever    Causes of vaginal infections:      Good bacteria from the vagina have been destroyed by bad bacteria    Reaction to something in the vagina such as a tampon or scented/perfumed soaps or bubble bath    STI's    Sensitivities to soaps/detergents/dryer sheets, lubricants, etc.    Hormonal changes    Recent use of antibiotics     Infections can also occur after you've had intercourse with a new partner or if you have had frequent intercourse         Here is a list of suggestions that may help prevent/treat vaginal infections and will help maintain a healthy vaginal environment:      1.  Boosting your immune system so you can heal faster      Make sure you are getting adequate sleep    Drink 2-3 quarts of fluids per day, Cranberries or cranberry juice (unsweetened)    Eat more nuts, grains, raw veggies, yogurt, chapo, grapefruit    Decrease intake of refined sugar, red meat and alcohol    Echinacea - 3 times a day for chronic problem or every 2 hours for acute symptoms; use as directed on bottle          2.  Changing the vaginal environment to a more acid state       Soak in a warm bath tub with one cup of vinegar or lemon juice. Do not use scented soap, bubble bath, or oils.     Acidophilus capsules:  1 in your vagina at bedtime for 5-7  nights    Herbal sitz bath or livia-wash with:  TBSP tea tree oil or 2 TBS cider vinegar      3.  Increasing the good healthy bacteria      At each meal drink 1 tsp apple cider vinegar and 1 tsp honey in   cup warm water    Eat garlic daily, capsules or fresh.      Take probiotics 4-8 billion units/day      4.  Preventive measures      Wear cotton underwear, no thongs.  Do not wear tight clothes or pantyhose    Shower soon after working or change out of sweaty clothing     Do not wear underwear to bed.  The vaginal environment needs to breathe    Never douche or use vaginal , the vagina is self-cleaning!    Use white, unscented toilet paper.  Do not use baby wipes.  Wipe from front to back    Use only unscented tampons and pads, buy organic products if desired    Do not use perfumes/oils/lotions near your vagina or take bubble baths    Use only mild, unscented soaps around your vaginal area     Do not use fabric softeners/dryer sheets    Use gentle, unscented detergent, consider buying non-petroleum based detergents    Use only water based lubricants during sexual contact    Abstain from intercourse during times of infection    Alternative Treatment  Boric acid capsules one per vagina (not by mouth!!! Very toxic if taken orally) at bedtime for 5 days (or as suggested by your provider) may be an effective alternative treatment and also more effective for those with chronic yeast vaginitis. Boric acid is available at the pharmacy but must be purchased along with gelatin caps for insertion. It might also be available at a local compounding pharmacy. Boric acid is not safe for pregnant women. Discuss with your midwife if this treatment interests you.     If your symptoms do not resolve or if you have questions please call:     [facility]  [phone]

## 2019-09-05 LAB — LAB SCANNED RESULT: NORMAL

## 2019-09-06 LAB
HIV1 RNA # PLAS NAA DL=20: NORMAL {COPIES}/ML
HIV1 RNA SERPL NAA+PROBE-LOG#: NORMAL {LOG_COPIES}/ML

## 2019-09-09 ENCOUNTER — PRENATAL OFFICE VISIT (OUTPATIENT)
Dept: OBGYN | Facility: CLINIC | Age: 24
End: 2019-09-09
Payer: COMMERCIAL

## 2019-09-09 VITALS — BODY MASS INDEX: 30.07 KG/M2 | SYSTOLIC BLOOD PRESSURE: 102 MMHG | DIASTOLIC BLOOD PRESSURE: 62 MMHG | WEIGHT: 192 LBS

## 2019-09-09 DIAGNOSIS — O43.199 MARGINAL INSERTION OF UMBILICAL CORD AFFECTING MANAGEMENT OF MOTHER: Primary | ICD-10-CM

## 2019-09-09 DIAGNOSIS — O09.893 HIV RISK FACTORS AFFECTING PREGNANCY IN THIRD TRIMESTER: ICD-10-CM

## 2019-09-09 PROCEDURE — 99207 ZZC COMPLICATED OB VISIT: CPT | Performed by: OBSTETRICS & GYNECOLOGY

## 2019-09-09 NOTE — NURSING NOTE
"Chief Complaint   Patient presents with     Prenatal Care     33 weeks- no concerns        Initial /62 (BP Location: Right arm, Patient Position: Sitting, Cuff Size: Adult Regular)   Wt 87.1 kg (192 lb)   LMP 2019 (Exact Date)   BMI 30.07 kg/m   Estimated body mass index is 30.07 kg/m  as calculated from the following:    Height as of 19: 1.702 m (5' 7\").    Weight as of this encounter: 87.1 kg (192 lb).  BP completed using cuff size: regular    Questioned patient about current smoking habits.  Pt. has never smoked.          The following HM Due: NONE    33w0d  Zoltan Butler CMA              "

## 2019-09-09 NOTE — PROGRESS NOTES
No c/o's.  Still needs to make appt w/ ID in [place] since finishing school in [place].  Will get that scheduled.  Labs last week showed undetectable HIV viral load and normal CBC, LFTs.  Had f/u MFM U/S that shows pelvic kidney, marginal CI, but normal growth.  Fetal movement counts BID,  labor/premature rupture of membranes precautions reviewed.  RTC 2 week(s).    Encounter Diagnoses   Name Primary?     Marginal insertion of umbilical cord affecting management of mother      HIV risk factors affecting pregnancy in third trimester        Risk factors listed above are stable and being addressed as noted.    Damien Ramirez MD  [facility]

## 2019-09-23 ENCOUNTER — PRENATAL OFFICE VISIT (OUTPATIENT)
Dept: OBGYN | Facility: CLINIC | Age: 24
End: 2019-09-23
Payer: COMMERCIAL

## 2019-09-23 VITALS — SYSTOLIC BLOOD PRESSURE: 120 MMHG | WEIGHT: 194 LBS | BODY MASS INDEX: 30.38 KG/M2 | DIASTOLIC BLOOD PRESSURE: 66 MMHG

## 2019-09-23 DIAGNOSIS — O43.199 MARGINAL INSERTION OF UMBILICAL CORD AFFECTING MANAGEMENT OF MOTHER: ICD-10-CM

## 2019-09-23 DIAGNOSIS — O09.893 HIV RISK FACTORS AFFECTING PREGNANCY IN THIRD TRIMESTER: Primary | ICD-10-CM

## 2019-09-23 PROCEDURE — 59425 ANTEPARTUM CARE ONLY: CPT | Performed by: OBSTETRICS & GYNECOLOGY

## 2019-09-23 PROCEDURE — 99207 ZZC COMPLICATED OB VISIT: CPT | Performed by: OBSTETRICS & GYNECOLOGY

## 2019-09-23 NOTE — NURSING NOTE
"Chief Complaint   Patient presents with     Prenatal Care       Initial /66 (BP Location: Left arm, Cuff Size: Adult Regular)   Wt 88 kg (194 lb)   LMP 2019 (Exact Date)   BMI 30.38 kg/m   Estimated body mass index is 30.38 kg/m  as calculated from the following:    Height as of 19: 1.702 m (5' 7\").    Weight as of this encounter: 88 kg (194 lb).  BP completed using cuff size: regular    Questioned patient about current smoking habits.  Pt. has never smoked.          +FM  -headaches  -Contractions  -vaginal bleeding or leaking of fluids  +edema in feet after work    Mitchell Ngo, TOM                "

## 2019-09-23 NOTE — PROGRESS NOTES
No c/o's.  Has MFM U/S 10/8.  GBS at next visit.  Fetal movement counts BID,  labor/premature rupture of membranes precautions reviewed.  RTC 2 week(s).    Encounter Diagnoses   Name Primary?     HIV risk factors affecting pregnancy in third trimester Yes     Marginal insertion of umbilical cord affecting management of mother        Risk factors listed above are stable and being addressed as noted.    Damien Ramirez MD  [facility]

## 2019-10-16 ENCOUNTER — HOSPITAL ENCOUNTER (OUTPATIENT)
Dept: ULTRASOUND IMAGING | Facility: CLINIC | Age: 24
Discharge: HOME | End: 2019-10-16
Attending: OBSTETRICS & GYNECOLOGY | Admitting: OBSTETRICS & GYNECOLOGY
Payer: COMMERCIAL

## 2019-10-16 ENCOUNTER — OFFICE VISIT (OUTPATIENT)
Dept: MATERNAL FETAL MEDICINE | Facility: CLINIC | Age: 24
End: 2019-10-16
Attending: OBSTETRICS & GYNECOLOGY
Payer: COMMERCIAL

## 2019-10-16 DIAGNOSIS — O40.3XX0 POLYHYDRAMNIOS AFFECTING PREGNANCY IN THIRD TRIMESTER: Primary | ICD-10-CM

## 2019-10-16 DIAGNOSIS — O09.893 HIV RISK FACTORS AFFECTING PREGNANCY IN THIRD TRIMESTER: ICD-10-CM

## 2019-10-16 PROCEDURE — 76819 FETAL BIOPHYS PROFIL W/O NST: CPT | Performed by: OBSTETRICS & GYNECOLOGY

## 2019-10-16 PROCEDURE — 76816 OB US FOLLOW-UP PER FETUS: CPT

## 2019-10-16 NOTE — PROGRESS NOTES
"Please see \"Imaging\" tab under \"Chart Review\" for details of today's ultrasound.    Shadi Sosa M.D.  Specialist in Maternal-Fetal Medicine   \  "

## 2019-10-22 ENCOUNTER — HOSPITAL ENCOUNTER (OUTPATIENT)
Dept: ULTRASOUND IMAGING | Facility: CLINIC | Age: 24
Discharge: HOME | End: 2019-10-22
Attending: OBSTETRICS & GYNECOLOGY | Admitting: OBSTETRICS & GYNECOLOGY
Payer: COMMERCIAL

## 2019-10-22 ENCOUNTER — OFFICE VISIT (OUTPATIENT)
Dept: MATERNAL FETAL MEDICINE | Facility: CLINIC | Age: 24
End: 2019-10-22
Attending: OBSTETRICS & GYNECOLOGY
Payer: COMMERCIAL

## 2019-10-22 DIAGNOSIS — O40.3XX0 POLYHYDRAMNIOS AFFECTING PREGNANCY IN THIRD TRIMESTER: Primary | ICD-10-CM

## 2019-10-22 DIAGNOSIS — O09.893 HIV RISK FACTORS AFFECTING PREGNANCY IN THIRD TRIMESTER: ICD-10-CM

## 2019-10-22 DIAGNOSIS — O40.3XX0 POLYHYDRAMNIOS AFFECTING PREGNANCY IN THIRD TRIMESTER: ICD-10-CM

## 2019-10-22 PROCEDURE — 76819 FETAL BIOPHYS PROFIL W/O NST: CPT

## 2019-10-22 NOTE — PROGRESS NOTES
Please refer to ultrasound report under 'Imaging' Studies of 'Chart Review' tabs.    Jose Solano M.D.

## 2019-10-25 ENCOUNTER — HOSPITAL ENCOUNTER (INPATIENT)
Facility: CLINIC | Age: 24
LOS: 4 days | Discharge: HOME | End: 2019-10-29
Attending: OBSTETRICS & GYNECOLOGY | Admitting: FAMILY MEDICINE
Payer: COMMERCIAL

## 2019-10-25 ENCOUNTER — NURSE TRIAGE (OUTPATIENT)
Dept: NURSING | Facility: CLINIC | Age: 24
End: 2019-10-25

## 2019-10-25 ENCOUNTER — ANESTHESIA EVENT (OUTPATIENT)
Dept: OBGYN | Facility: CLINIC | Age: 24
End: 2019-10-25
Payer: COMMERCIAL

## 2019-10-25 ENCOUNTER — ANESTHESIA (OUTPATIENT)
Dept: OBGYN | Facility: CLINIC | Age: 24
End: 2019-10-25
Payer: COMMERCIAL

## 2019-10-25 ENCOUNTER — APPOINTMENT (OUTPATIENT)
Dept: ULTRASOUND IMAGING | Facility: CLINIC | Age: 24
End: 2019-10-25
Attending: FAMILY MEDICINE
Payer: COMMERCIAL

## 2019-10-25 DIAGNOSIS — B20 HUMAN IMMUNODEFICIENCY VIRUS (HIV) DISEASE (H): ICD-10-CM

## 2019-10-25 DIAGNOSIS — Z98.891 S/P CESAREAN SECTION: Primary | ICD-10-CM

## 2019-10-25 PROBLEM — Z36.89 ENCOUNTER FOR TRIAGE IN PREGNANT PATIENT: Status: ACTIVE | Noted: 2019-10-25

## 2019-10-25 LAB
ABO + RH BLD: NORMAL
ABO + RH BLD: NORMAL
HGB BLD-MCNC: 12.9 G/DL (ref 11.7–15.7)
SPECIMEN EXP DATE BLD: NORMAL
T PALLIDUM AB SER QL: NONREACTIVE

## 2019-10-25 PROCEDURE — 25000132 ZZH RX MED GY IP 250 OP 250 PS 637: Performed by: FAMILY MEDICINE

## 2019-10-25 PROCEDURE — 87536 HIV-1 QUANT&REVRSE TRNSCRPJ: CPT | Performed by: FAMILY MEDICINE

## 2019-10-25 PROCEDURE — 25000128 H RX IP 250 OP 636: Performed by: ANESTHESIOLOGY

## 2019-10-25 PROCEDURE — 40000671 ZZH STATISTIC ANESTHESIA CASE

## 2019-10-25 PROCEDURE — 86900 BLOOD TYPING SEROLOGIC ABO: CPT | Performed by: FAMILY MEDICINE

## 2019-10-25 PROCEDURE — 25800030 ZZH RX IP 258 OP 636: Performed by: ANESTHESIOLOGY

## 2019-10-25 PROCEDURE — 86901 BLOOD TYPING SEROLOGIC RH(D): CPT | Performed by: FAMILY MEDICINE

## 2019-10-25 PROCEDURE — 76819 FETAL BIOPHYS PROFIL W/O NST: CPT

## 2019-10-25 PROCEDURE — 86780 TREPONEMA PALLIDUM: CPT | Performed by: FAMILY MEDICINE

## 2019-10-25 PROCEDURE — 25800030 ZZH RX IP 258 OP 636: Performed by: FAMILY MEDICINE

## 2019-10-25 PROCEDURE — 12000000 ZZH R&B MED SURG/OB

## 2019-10-25 PROCEDURE — 87653 STREP B DNA AMP PROBE: CPT | Performed by: FAMILY MEDICINE

## 2019-10-25 PROCEDURE — 25000125 ZZHC RX 250: Performed by: ANESTHESIOLOGY

## 2019-10-25 PROCEDURE — 85018 HEMOGLOBIN: CPT | Performed by: FAMILY MEDICINE

## 2019-10-25 PROCEDURE — 87186 SC STD MICRODIL/AGAR DIL: CPT | Performed by: FAMILY MEDICINE

## 2019-10-25 PROCEDURE — 25000128 H RX IP 250 OP 636: Performed by: FAMILY MEDICINE

## 2019-10-25 PROCEDURE — 37000011 ZZH ANESTHESIA WARD SERVICE

## 2019-10-25 PROCEDURE — G0463 HOSPITAL OUTPT CLINIC VISIT: HCPCS

## 2019-10-25 PROCEDURE — 3E0R3BZ INTRODUCTION OF ANESTHETIC AGENT INTO SPINAL CANAL, PERCUTANEOUS APPROACH: ICD-10-PCS | Performed by: ANESTHESIOLOGY

## 2019-10-25 PROCEDURE — 00HU33Z INSERTION OF INFUSION DEVICE INTO SPINAL CANAL, PERCUTANEOUS APPROACH: ICD-10-PCS | Performed by: ANESTHESIOLOGY

## 2019-10-25 RX ORDER — EPHEDRINE SULFATE 50 MG/ML
5 INJECTION, SOLUTION INTRAMUSCULAR; INTRAVENOUS; SUBCUTANEOUS
Status: DISCONTINUED | OUTPATIENT
Start: 2019-10-25 | End: 2019-10-25

## 2019-10-25 RX ORDER — SODIUM CHLORIDE, SODIUM LACTATE, POTASSIUM CHLORIDE, CALCIUM CHLORIDE 600; 310; 30; 20 MG/100ML; MG/100ML; MG/100ML; MG/100ML
INJECTION, SOLUTION INTRAVENOUS CONTINUOUS
Status: DISCONTINUED | OUTPATIENT
Start: 2019-10-25 | End: 2019-10-26

## 2019-10-25 RX ORDER — NALOXONE HYDROCHLORIDE 0.4 MG/ML
.1-.4 INJECTION, SOLUTION INTRAMUSCULAR; INTRAVENOUS; SUBCUTANEOUS
Status: DISCONTINUED | OUTPATIENT
Start: 2019-10-25 | End: 2019-10-26

## 2019-10-25 RX ORDER — DEXTROSE, SODIUM CHLORIDE, SODIUM LACTATE, POTASSIUM CHLORIDE, AND CALCIUM CHLORIDE 5; .6; .31; .03; .02 G/100ML; G/100ML; G/100ML; G/100ML; G/100ML
1000 INJECTION, SOLUTION INTRAVENOUS ONCE
Status: COMPLETED | OUTPATIENT
Start: 2019-10-25 | End: 2019-10-25

## 2019-10-25 RX ORDER — ACETAMINOPHEN 325 MG/1
650 TABLET ORAL EVERY 4 HOURS PRN
Status: DISCONTINUED | OUTPATIENT
Start: 2019-10-25 | End: 2019-10-26

## 2019-10-25 RX ORDER — ONDANSETRON 2 MG/ML
4 INJECTION INTRAMUSCULAR; INTRAVENOUS EVERY 6 HOURS PRN
Status: DISCONTINUED | OUTPATIENT
Start: 2019-10-25 | End: 2019-10-26

## 2019-10-25 RX ORDER — PENICILLIN G POTASSIUM 5000000 [IU]/1
5 INJECTION, POWDER, FOR SOLUTION INTRAMUSCULAR; INTRAVENOUS ONCE
Status: COMPLETED | OUTPATIENT
Start: 2019-10-25 | End: 2019-10-25

## 2019-10-25 RX ORDER — EPHEDRINE SULFATE 50 MG/ML
5 INJECTION, SOLUTION INTRAMUSCULAR; INTRAVENOUS; SUBCUTANEOUS
Status: DISCONTINUED | OUTPATIENT
Start: 2019-10-25 | End: 2019-10-26

## 2019-10-25 RX ORDER — ABACAVIR AND LAMIVUDINE 600; 300 MG/1; MG/1
1 TABLET, FILM COATED ORAL DAILY
Status: DISCONTINUED | OUTPATIENT
Start: 2019-10-25 | End: 2019-10-26

## 2019-10-25 RX ORDER — LANOLIN ALCOHOL/MO/W.PET/CERES
400 CREAM (GRAM) TOPICAL DAILY
Status: DISCONTINUED | OUTPATIENT
Start: 2019-10-25 | End: 2019-10-26

## 2019-10-25 RX ORDER — OXYTOCIN/0.9 % SODIUM CHLORIDE 30/500 ML
1-24 PLASTIC BAG, INJECTION (ML) INTRAVENOUS CONTINUOUS
Status: DISCONTINUED | OUTPATIENT
Start: 2019-10-25 | End: 2019-10-26

## 2019-10-25 RX ORDER — ALBUTEROL SULFATE 0.83 MG/ML
2.5 SOLUTION RESPIRATORY (INHALATION) EVERY 6 HOURS PRN
Status: DISCONTINUED | OUTPATIENT
Start: 2019-10-25 | End: 2019-10-26

## 2019-10-25 RX ORDER — CARBOPROST TROMETHAMINE 250 UG/ML
250 INJECTION, SOLUTION INTRAMUSCULAR
Status: DISCONTINUED | OUTPATIENT
Start: 2019-10-25 | End: 2019-10-26

## 2019-10-25 RX ORDER — NALBUPHINE HYDROCHLORIDE 10 MG/ML
2.5-5 INJECTION, SOLUTION INTRAMUSCULAR; INTRAVENOUS; SUBCUTANEOUS EVERY 6 HOURS PRN
Status: DISCONTINUED | OUTPATIENT
Start: 2019-10-25 | End: 2019-10-25

## 2019-10-25 RX ORDER — FERROUS SULFATE 325(65) MG
325 TABLET ORAL
Status: DISCONTINUED | OUTPATIENT
Start: 2019-10-26 | End: 2019-10-26

## 2019-10-25 RX ORDER — ALBUTEROL SULFATE 90 UG/1
2 AEROSOL, METERED RESPIRATORY (INHALATION) 4 TIMES DAILY PRN
Status: DISCONTINUED | OUTPATIENT
Start: 2019-10-25 | End: 2019-10-26

## 2019-10-25 RX ORDER — NALOXONE HYDROCHLORIDE 0.4 MG/ML
.1-.4 INJECTION, SOLUTION INTRAMUSCULAR; INTRAVENOUS; SUBCUTANEOUS
Status: DISCONTINUED | OUTPATIENT
Start: 2019-10-25 | End: 2019-10-25

## 2019-10-25 RX ORDER — OXYCODONE AND ACETAMINOPHEN 5; 325 MG/1; MG/1
1 TABLET ORAL
Status: DISCONTINUED | OUTPATIENT
Start: 2019-10-25 | End: 2019-10-26

## 2019-10-25 RX ORDER — OXYTOCIN/0.9 % SODIUM CHLORIDE 30/500 ML
100-340 PLASTIC BAG, INJECTION (ML) INTRAVENOUS CONTINUOUS PRN
Status: DISCONTINUED | OUTPATIENT
Start: 2019-10-25 | End: 2019-10-26

## 2019-10-25 RX ORDER — IBUPROFEN 800 MG/1
800 TABLET, FILM COATED ORAL
Status: DISCONTINUED | OUTPATIENT
Start: 2019-10-25 | End: 2019-10-26

## 2019-10-25 RX ORDER — OXYTOCIN 10 [USP'U]/ML
10 INJECTION, SOLUTION INTRAMUSCULAR; INTRAVENOUS
Status: DISCONTINUED | OUTPATIENT
Start: 2019-10-25 | End: 2019-10-26

## 2019-10-25 RX ORDER — METHYLERGONOVINE MALEATE 0.2 MG/ML
200 INJECTION INTRAVENOUS
Status: DISCONTINUED | OUTPATIENT
Start: 2019-10-25 | End: 2019-10-26

## 2019-10-25 RX ORDER — PRENATAL VIT/IRON FUM/FOLIC AC 27MG-0.8MG
1 TABLET ORAL DAILY
Status: DISCONTINUED | OUTPATIENT
Start: 2019-10-25 | End: 2019-10-26

## 2019-10-25 RX ORDER — NALBUPHINE HYDROCHLORIDE 10 MG/ML
2.5-5 INJECTION, SOLUTION INTRAMUSCULAR; INTRAVENOUS; SUBCUTANEOUS EVERY 6 HOURS PRN
Status: DISCONTINUED | OUTPATIENT
Start: 2019-10-25 | End: 2019-10-26

## 2019-10-25 RX ADMIN — ZIDOVUDINE 1 MG/KG/HR: 10 INJECTION, SOLUTION INTRAVENOUS at 19:11

## 2019-10-25 RX ADMIN — ABACAVIR SULFATE AND LAMIVUDINE 1 TABLET: 600; 300 TABLET, FILM COATED ORAL at 19:17

## 2019-10-25 RX ADMIN — ACETAMINOPHEN 400 MCG: 400 TABLET ORAL at 19:17

## 2019-10-25 RX ADMIN — SODIUM CHLORIDE, SODIUM LACTATE, POTASSIUM CHLORIDE, CALCIUM CHLORIDE AND DEXTROSE MONOHYDRATE 1000 ML: 5; 600; 310; 30; 20 INJECTION, SOLUTION INTRAVENOUS at 08:22

## 2019-10-25 RX ADMIN — SODIUM CHLORIDE, POTASSIUM CHLORIDE, SODIUM LACTATE AND CALCIUM CHLORIDE: 600; 310; 30; 20 INJECTION, SOLUTION INTRAVENOUS at 11:53

## 2019-10-25 RX ADMIN — DOLUTEGRAVIR SODIUM 50 MG: 50 TABLET, FILM COATED ORAL at 19:17

## 2019-10-25 RX ADMIN — EPHEDRINE SULFATE 5 MG: 50 INJECTION, SOLUTION INTRAVENOUS at 11:51

## 2019-10-25 RX ADMIN — FLUTICASONE FUROATE AND VILANTEROL TRIFENATATE 1 PUFF: 200; 25 POWDER RESPIRATORY (INHALATION) at 19:18

## 2019-10-25 RX ADMIN — SODIUM CHLORIDE 2.5 MILLION UNITS: 9 INJECTION, SOLUTION INTRAVENOUS at 22:28

## 2019-10-25 RX ADMIN — BUPIVACAINE HYDROCHLORIDE: 7.5 INJECTION, SOLUTION EPIDURAL; RETROBULBAR at 21:07

## 2019-10-25 RX ADMIN — PENICILLIN G POTASSIUM 5 MILLION UNITS: 5000000 POWDER, FOR SOLUTION INTRAMUSCULAR; INTRAPLEURAL; INTRATHECAL; INTRAVENOUS at 13:34

## 2019-10-25 RX ADMIN — BUPIVACAINE HYDROCHLORIDE: 7.5 INJECTION, SOLUTION EPIDURAL; RETROBULBAR at 11:41

## 2019-10-25 RX ADMIN — SODIUM CHLORIDE, POTASSIUM CHLORIDE, SODIUM LACTATE AND CALCIUM CHLORIDE: 600; 310; 30; 20 INJECTION, SOLUTION INTRAVENOUS at 09:49

## 2019-10-25 RX ADMIN — SODIUM CHLORIDE 2.5 MILLION UNITS: 9 INJECTION, SOLUTION INTRAVENOUS at 18:27

## 2019-10-25 RX ADMIN — SODIUM CHLORIDE, POTASSIUM CHLORIDE, SODIUM LACTATE AND CALCIUM CHLORIDE 1000 ML: 600; 310; 30; 20 INJECTION, SOLUTION INTRAVENOUS at 10:56

## 2019-10-25 RX ADMIN — EPHEDRINE SULFATE 5 MG: 50 INJECTION, SOLUTION INTRAVENOUS at 11:40

## 2019-10-25 RX ADMIN — EPHEDRINE SULFATE 5 MG: 50 INJECTION, SOLUTION INTRAVENOUS at 11:43

## 2019-10-25 RX ADMIN — ZIDOVUDINE 190 MG: 10 INJECTION, SOLUTION INTRAVENOUS at 17:20

## 2019-10-25 ASSESSMENT — MIFFLIN-ST. JEOR: SCORE: 1717.04

## 2019-10-25 NOTE — PROVIDER NOTIFICATION
10/25/19 0702   Provider Notification   Provider Name/Title Dr. Ricardo   Method of Notification Phone   Request Evaluate - Remote   Notification Reason Patient Arrived;Variability Change;Status Update   Dr. Ricardo updated of patient's arrival to rule out labor. Patient reporting CTX since 0430 lasting every 3-5 minutes apart, no leaking of fluid or bloody show. Baby has been on the monitor for 25 minutes and has absent variability. Had patient change positions and drink juice without change of variability. Orders to [facility] now.

## 2019-10-25 NOTE — PHARMACY-ADMISSION MEDICATION HISTORY
Admission medication history interview status for this patient is complete. See [facility] admission navigator for allergy information, prior to admission medications and immunization status.     Medication history interview source(s):Patient  Medication history resources (including written lists, pill bottles, clinic record):None  Primary pharmacy:    Changes made to PTA medication list:  Added:   Deleted: Clotrimazole  Changed:     Actions taken by pharmacist (provider contacted, etc):None     Additional medication history information:None    Medication reconciliation/reorder completed by provider prior to medication history? No        Prior to Admission medications    Medication Sig Last Dose Taking? Auth Provider   abacavir-lamiVUDine (EPZICOM) 600-300 MG tablet Take 1 tablet by mouth daily 10/24/2019 at Unknown time Yes Angel Jon MD   albuterol (PROAIR HFA/PROVENTIL HFA/VENTOLIN HFA) 108 (90 Base) MCG/ACT inhaler Inhale 2 puffs into the lungs 4 times daily as needed for shortness of breath / dyspnea Past Week at Unknown time Yes Angel Jon MD   dolutegravir (TIVICAY) 50 MG tablet Take 1 tablet (50 mg) by mouth daily 10/24/2019 at Unknown time Yes Angel Jon MD   ferrous sulfate (FEROSUL) 325 (65 Fe) MG tablet Take 1 tablet (325 mg) by mouth daily (with breakfast) 10/24/2019 at Unknown time Yes Damien Ramirez MD   fluticasone-salmeterol (ADVAIR DISKUS) 250-50 MCG/DOSE inhaler Inhale 1 puff into the lungs 2 times daily 10/24/2019 at Unknown time Yes Angel Jon MD   folic acid (FOLVITE) 400 MCG tablet Take 1 tablet (400 mcg) by mouth daily 10/24/2019 at Unknown time Yes Damien Ramirez MD   Prenatal Vit-Fe Fumarate-FA (PRENATAL MULTIVITAMIN W/IRON) 27-0.8 MG tablet Take 1 tablet by mouth daily 10/24/2019 at Unknown time Yes Damien Ramirez MD   albuterol (2.5 MG/3ML) 0.083% nebulizer solution Take 3 mLs by nebulization every 6 hours as needed for shortness of  breath / dyspnea. More than a month at Unknown time  Angel Jon MD

## 2019-10-25 NOTE — PROVIDER NOTIFICATION
10/25/19 1230   Provider Notification   Provider Name/Title Davion   Method of Notification At Bedside   Request Evaluate in Person   in to discuss meds, sve and plan

## 2019-10-25 NOTE — ANESTHESIA PREPROCEDURE EVALUATION
Anesthesia Pre-Procedure Evaluation    Patient: Priscila Betancourt   MRN: [id number] : 1995          Preoperative Diagnosis: * No surgery found *        Past Medical History:   Diagnosis Date     Asthma     Hosp as child, no intubations     Asymptomatic human immunodeficiency virus (HIV) infection status (H) 2019     Past Surgical History:   Procedure Laterality Date     COLONOSCOPY      Rectal bleeding due to chronic constipation     Anesthesia Evaluation       history and physical reviewed .             ROS/MED HX    ENT/Pulmonary:     (+)Intermittent asthma , . .    Neurologic:       Cardiovascular:  - neg cardiovascular ROS       METS/Exercise Tolerance:     Hematologic:         Musculoskeletal:         GI/Hepatic:     (+) GERD Asymptomatic on medication,       Renal/Genitourinary:         Endo:         Psychiatric:         Infectious Disease:   (+) HIV,       Malignancy:         Other:    (+) Possibly pregnant                    neg OB ROS            Physical Exam  Normal systems: cardiovascular and pulmonary    Airway   Mallampati: II  TM distance: < 3 FB  Mouth opening: > 3 cm    Dental     Cardiovascular       Pulmonary     Other findings: HIV +    Lab Test        10/25/19     09/03/19     08/13/19     06/19/19                       0820          1538          1334          1502          WBC           --          6.3          6.7          6.2           HGB          12.9         10.0*        9.5*         10.2*         MCV           --          87           84           84            PLT           --          294          311          283            No lab results found.        Lab Results   Component Value Date    WBC 6.3 2019    HGB 12.9 10/25/2019    HCT 31.7 (L) 2019     2019    ALT 13 2019    AST 10 2019    HCG Positive (A) 2019       Preop Vitals  BP Readings from Last 3 Encounters:   10/25/19 107/60   19 120/66   19 102/62    Pulse Readings  "from Last 3 Encounters:   10/25/19 89   06/27/19 95   05/27/11 91      Resp Readings from Last 3 Encounters:   10/25/19 16   06/27/19 16   05/16/11 24    SpO2 Readings from Last 3 Encounters:   06/27/19 100%   05/27/11 98%   05/25/11 100%      Temp Readings from Last 1 Encounters:   10/25/19 98.2  F (36.8  C) (Oral)    Ht Readings from Last 1 Encounters:   10/25/19 1.702 m (5' 7\")      Wt Readings from Last 1 Encounters:   10/25/19 93.4 kg (206 lb)    Estimated body mass index is 32.26 kg/m  as calculated from the following:    Height as of this encounter: 1.702 m (5' 7\").    Weight as of this encounter: 93.4 kg (206 lb).       Anesthesia Plan      History & Physical Review  History and physical reviewed and following examination; no interval change.    ASA Status:  2 .  OB Epidural Asa: 2   NPO Status:  > 8 hours    Plan for Epidural          Postoperative Care      Consents  Anesthetic plan, risks, benefits and alternatives discussed with:  Patient..                 Demetrius Acevedo MD                    .  "

## 2019-10-25 NOTE — TELEPHONE ENCOUNTER
"\"Symptoms starting 1 hour ago.\" Reporting contractions are less then 5 min apart x 1 hour. Fetal movement is positive. New blurry vision. Dizziness with ambulation.  39 weeks 4 days .  [facility] notified patient is en route.    Patient was advised to have someone drive her to [facility].    Caller verbalized understanding. Denies further questions.    Kayla Kelly RN  [facility]        Reason for Disposition    New blurred vision or vision changes    Additional Information    Negative: Passed out (i.e., lost consciousness, collapsed and was not responding)    Negative: Shock suspected (e.g., cold/pale/clammy skin, too weak to stand, low BP, rapid pulse)    Negative: Difficult to awaken or acting confused (e.g., disoriented, slurred speech)    Negative: [1] SEVERE abdominal pain (e.g., excruciating) AND [2] constant AND [3] present > 1 hour    Negative: Severe bleeding (e.g., continuous red blood from vagina, or large blood clots)    Negative: Umbilical cord hanging out of the vagina (shiny, white, curled appearance, \"like telephone cord\")    Negative: Uncontrollable urge to push (i.e., feels like baby is coming out now)    Negative: Can see baby    Negative: Sounds like a life-threatening emergency to the triager    Negative: [1] First baby (primipara) AND [2] contractions < 6 minutes apart  AND [3] present 2 hours    Negative: [1] History of prior delivery (multipara) AND [2] contractions < 10 minutes apart AND [3] present 1 hour    Negative: [1] History of rapid prior delivery AND [2] contractions < 10 minutes apart    Negative: [1] Leakage of fluid from vagina AND [2] green or brown in color    Negative: [1] Leakage of fluid from vagina AND [2] leakage started > 4 hours ago    Negative: Vaginal bleeding or spotting    Negative: Baby moving less today (e.g., kick count < 5 in 1 hour or < 10 in 2 hours)    Negative: Severe headache or headache that won't go away    Protocols " used: PREGNANCY - LABOR-A-AH

## 2019-10-25 NOTE — H&P
[facility] Labor and Delivery History and Physical    Priscila Betancourt MRN# [id number]   Age: 24 year old YOB: 1995     Date of Admission:  10/25/2019    Primary care provider: Angel Jon           Chief Complaint:   Priscila Betancourt is a 24 year old female who is  @ 39w4d pregnant and being admitted for active labor management and polyhydramnios, HIV +, scant prenatal care, bpp today . Last HIV quantitative result 9/3 was undetectable.  Has has undetectable levels x 3 years.  Discussed since we do not have recent labs on her viral loads, a  CS could be considered. She declines this. She has been offered IV zidovudine per [facility] and would like to have that.           Pregnancy history:     OBSTETRIC HISTORY:    OB History    Para Term  AB Living   1 0 0 0 0 0   SAB TAB Ectopic Multiple Live Births   0 0 0 0 0      # Outcome Date GA Lbr Jerrell/2nd Weight Sex Delivery Anes PTL Lv   1 Current                EDC: Estimated Date of Delivery: Oct 28, 2019    Prenatal Labs:   Lab Results   Component Value Date    ABO A 2019    RH Pos 2019    AS Neg 2019    HEPBANG Nonreactive 2019    CHPCRT Negative 2019    GCPCRT Negative 2019    HGB 12.9 10/25/2019    HIV Negative 2011       GBS Status:   No results found for: GBS    Active Problem List  Patient Active Problem List   Diagnosis     Moderate persistent asthma     HIV (human immunodeficiency virus) risk factors complicating pregnancy     Asymptomatic human immunodeficiency virus (HIV) infection status (H)     Marginal insertion of umbilical cord affecting management of mother     Polyhydramnios affecting pregnancy in third trimester     Encounter for triage in pregnant patient     Indication for care in labor or delivery       Medication Prior to Admission  Medications Prior to Admission   Medication Sig Dispense Refill Last Dose     abacavir-lamiVUDine (EPZICOM) 600-300 MG tablet Take 1  tablet by mouth daily 90 tablet 1 10/24/2019 at Unknown time     albuterol (PROAIR HFA/PROVENTIL HFA/VENTOLIN HFA) 108 (90 Base) MCG/ACT inhaler Inhale 2 puffs into the lungs 4 times daily as needed for shortness of breath / dyspnea 2 Inhaler 4 Past Week at Unknown time     dolutegravir (TIVICAY) 50 MG tablet Take 1 tablet (50 mg) by mouth daily 90 tablet 1 10/24/2019 at Unknown time     ferrous sulfate (FEROSUL) 325 (65 Fe) MG tablet Take 1 tablet (325 mg) by mouth daily (with breakfast) 90 tablet 3 10/24/2019 at Unknown time     fluticasone-salmeterol (ADVAIR DISKUS) 250-50 MCG/DOSE inhaler Inhale 1 puff into the lungs 2 times daily 1 Inhaler 5 10/24/2019 at Unknown time     folic acid (FOLVITE) 400 MCG tablet Take 1 tablet (400 mcg) by mouth daily 100 tablet 3 10/24/2019 at Unknown time     Prenatal Vit-Fe Fumarate-FA (PRENATAL MULTIVITAMIN W/IRON) 27-0.8 MG tablet Take 1 tablet by mouth daily 100 tablet 3 10/24/2019 at Unknown time     albuterol (2.5 MG/3ML) 0.083% nebulizer solution Take 3 mLs by nebulization every 6 hours as needed for shortness of breath / dyspnea. 24 mL 3 More than a month at Unknown time     [] clotrimazole (LOTRIMIN) 1 % vaginal cream Place 1 Applicatorful vaginally At Bedtime for 7 days 1 g 0 Taking   .        Maternal Past Medical History:     Past Medical History:   Diagnosis Date     Asthma     Hosp as child, no intubations     Asymptomatic human immunodeficiency virus (HIV) infection status (H) 2019                       Family History:   This patient has no significant family history            Social History:   This patient has no significant social history         Review of Systems:   CONSTITUTIONAL: NEGATIVE for fever, chills, change in weight  INTEGUMENTARY/SKIN: NEGATIVE for worrisome rashes, moles or lesions  EYES: NEGATIVE for vision changes or irritation  ENT/MOUTH: NEGATIVE for ear, mouth and throat problems  RESP: NEGATIVE for significant cough or SOB  BREAST:  "NEGATIVE for masses, tenderness or discharge  CV: NEGATIVE for chest pain, palpitations or peripheral edema  GI: NEGATIVE for nausea, abdominal pain, heartburn, or change in bowel habits  : NEGATIVE for frequency, dysuria, or hematuria  MUSCULOSKELETAL: NEGATIVE for significant arthralgias or myalgia  NEURO: NEGATIVE for weakness, dizziness or paresthesias  ENDOCRINE: NEGATIVE for temperature intolerance, skin/hair changes  HEME: NEGATIVE for bleeding problems  PSYCHIATRIC: NEGATIVE for changes in mood or affect          Physical Exam:     Vitals were reviewed  All vitals stable  Patient Vitals for the past 8 hrs:   BP Temp Temp src Pulse Resp Height Weight   10/25/19 0719 107/60 -- -- -- -- -- --   10/25/19 0635 107/65 98.2  F (36.8  C) Oral 89 16 1.702 m (5' 7\") 93.4 kg (206 lb)   10/25/19 0630 107/65 -- -- -- -- -- --     Constitutional: Awake, alert, cooperative, no apparent distress, and appears stated age.  Eyes: Lids and lashes normal, pupils equal, round and reactive to light, extra ocular muscles intact, sclera clear, conjunctiva normal.  ENT: Normocephalic, without obvious abnormality, atramatic, sinuses nontender on palpation, external ears without lesions, oral pharynx with moist mucus membranes, tonsils without erythema or exudates, gums normal and good dentition.  Neck: Supple, symmetrical, trachea midline, no adenopathy, thyroid symmetric, not enlarged and no tenderness, skin normal.  Hematologic / Lymphatic: No cervical lymphadenopathy and no supraclavicular lymphadenopathy.  Back: Symmetric, no curvature, spinous processes are non-tender on palpation, paraspinous muscles are non-tender on palpation, no costal vertebral tenderness.  Lungs: No increased work of breathing, good air exchange, clear to auscultation bilaterally, no crackles or wheezing.  Cardiovascular: Regular rate and rhythm, normal S1 and S2, no S3 or S4, and no murmur noted.  Chest / Breast: Breasts symmetrical, skin without " lesion(s), no nipple retraction or dimpling, no nipple discharge, no masses palpated, no axillary or supraclavicular adenopathy.  Abdomen: No scars, normal bowel sounds, soft, non-distended, non-tender, no masses palpated, no hepatosplenomegally.  Genitourinary: No urethral discharge, normal external genitalia, no hernia.  Musculoskeletal: No redness, warmth, or swelling of the joints.  Full range of motion noted.  Motor strength is 5 out of 5 all extremities bilaterally.  Tone is normal.  Neurologic: Awake, alert, oriented to name, place and time.  Cranial nerves II-XII are grossly intact.  Motor is 5 out of 5 bilaterally.  Cerebellar finger to nose, heel to shin intact.  Sensory is intact.  Babinski down going, Romberg negative, and gait is normal.  Neuropsychiatric: Normal affect, mood, orientation, memory and insight.  Skin: No rashes, erythema, pallor, petechia or purpura.   Cervix:   Membranes: intact   Dilation: 3   Effacement: 90%   Station:-3   Consistency: soft   Position: Mid  Presentation:Cephalic  Fetal Heart Rate Tracing: Tier 1 (normal)  Tocometer: frequency q 2-3 minutes    [facility]                            Assessment:   Priscila Betancourt is a 24 year old female who is  @ 39w4d pregnant and being admitted for active labor management and polyhydramnios, HIV +, scant prenatal care, bpp today . Last HIV quantitative result 9/3 was undetectable.  Has has undetectable levels x 3 years.  Discussed since we do not have recent labs on her viral loads, a  CS could be considered. She declines this. She has been offered IV zidovudine per [facility] and would like to have that.           Plan:   Admit - see IP orders    1) HIV +, undetectable levels 9/3, unable to run test until Tuesday due to the [facility] lab status, discussed with [facility], they  agree with continuing labor, unless patient Desires CS,    No early AROM per [facility], but arom is allowed with undetectable levels for HIV, she has been taking medications  as  prescribed for HIV, and her level from 9/3 is good for 6 weeks, so we are on the borderline of cutoff range.     Due to the time since her labs we did Discuss CS as option, Which she declines   She dose however desires IV zudovidine due to the time since her HIV viral load was checked    Will not do FSE or IUPC due to risk for HIV transmission.   Normal OB management per MFM with a patient with undetectable levels (except FSE/IUPC)   Continue oral anti-retrovirals, which have been ordered    2) Polyhydramnios with labor, careful monitoring of fetal status, arom with care to prevent prolapse if arom needed    3) Fetal status: reassuring: Continuous fetal monitoring    4) GBS unknown: GBS pending, will start PCN until the lab is done    5) Epidural in place      Ema Ricardo, DO

## 2019-10-25 NOTE — PROVIDER NOTIFICATION
10/25/19 0830   Provider Notification   Provider Name/Title Dr Ricardo   Method of Notification In Department

## 2019-10-25 NOTE — PROVIDER NOTIFICATION
10/25/19 1700   Provider Notification   Provider Name/Title MELI   Method of Notification Electronic Page   Notification Reason Status Update   updated re: min to absent variab, baseline gradually rising, pt afebrile. MD will be in dept shortly.

## 2019-10-25 NOTE — PLAN OF CARE
[facility]  Gayle MURPHY Called because pt is homeless and they found a room at a shelter for pt. Room unavailable until Monday. Gayle referred to our social work group to coordinate.

## 2019-10-25 NOTE — ANESTHESIA PROCEDURE NOTES
Peripheral nerve/Neuraxial procedure note :         Assessment/Narrative  .  .  . Comments:  Pre-Procedure  Performed by Michelet Odell MD  Location: OB.      PreAnesthestic Checklist: patient identified, IV checked, risks and benefits discussed, informed consent obtained, monitors and equipment checked, pre-op evaluation and at physician/surgeon's request.    Timeout   Correct Patient: Yes  Correct Procedure: Epidural catheter placement  Correct Site: Yes   Correct Position: Yes    Procedure Documentation  Procedure:   Epidural catheter block for Labor    Patient currently in labor and she and OBMD request a labor epidural to control her labor pains. Patient was interviewed and examined. Procedure and risks including but not limited to bleeding, infection, nerve injury, paralysis, PDPH, and inadequate block requiring intervention discussed with patient. Questions answered. This epidural is to be placed in anticipation of vaginal delivery.  She consents to the epidural procedure.  Time-out was performed.  I or my partners remain immediately available for management of any issues or complications and will monitor at appropriate intervals.  Procedure: Patient sitting. Betadine prep x 3. Sterile drape applied.  Lidocaine 1%  local infiltration at L 3-4.  17 G. Tuohy needle at L3-4 by loss of resistance into epidural space.  No CSF, paresthesia or blood. 1.5 % Lidocaine with 1:200,000 Epinephrine 5cc test dose. Then 0.25% bupivicaine 10 cc with NS 5 cc.  Epidural catheter inserted w/o resistance to 5 cm in epidural space.  Aspiration negative for blood and CSF.   Negative for neuro change, paresthesia or symptoms of intravascular injection or intrathecal injection.  Infusion orders written and infusion of 0.125% bupivicaine 15cc per hour started.    Michelet Odell MD

## 2019-10-25 NOTE — PROVIDER NOTIFICATION
10/25/19 1443   Provider Notification   Provider Name/Title davion   Method of Notification Electronic Page;Phone   Request Evaluate - Remote   Notification Reason Other (Comment)   updated that pit has not been started due to absent or minimal variability and MD reviewing tracing. Davion will come up to evaluate in person.

## 2019-10-26 ENCOUNTER — ANESTHESIA EVENT (OUTPATIENT)
Dept: OBGYN | Facility: CLINIC | Age: 24
End: 2019-10-26
Payer: COMMERCIAL

## 2019-10-26 ENCOUNTER — ANESTHESIA (OUTPATIENT)
Dept: OBGYN | Facility: CLINIC | Age: 24
End: 2019-10-26
Payer: COMMERCIAL

## 2019-10-26 PROBLEM — Z98.891 S/P CESAREAN SECTION: Status: ACTIVE | Noted: 2019-10-26

## 2019-10-26 LAB
GP B STREP DNA SPEC QL NAA+PROBE: POSITIVE
HIV1 RNA # PLAS NAA DL=20: <20 {COPIES}/ML
HIV1 RNA SERPL NAA+PROBE-LOG#: <1.3 {LOG_COPIES}/ML
SPECIMEN SOURCE: ABNORMAL

## 2019-10-26 PROCEDURE — 25000132 ZZH RX MED GY IP 250 OP 250 PS 637: Performed by: OBSTETRICS & GYNECOLOGY

## 2019-10-26 PROCEDURE — 37000008 ZZH ANESTHESIA TECHNICAL FEE, 1ST 30 MIN: Performed by: FAMILY MEDICINE

## 2019-10-26 PROCEDURE — 71000012 ZZH RECOVERY PHASE 1 LEVEL 1 FIRST HR: Performed by: FAMILY MEDICINE

## 2019-10-26 PROCEDURE — 37000009 ZZH ANESTHESIA TECHNICAL FEE, EACH ADDTL 15 MIN: Performed by: FAMILY MEDICINE

## 2019-10-26 PROCEDURE — 88307 TISSUE EXAM BY PATHOLOGIST: CPT | Performed by: FAMILY MEDICINE

## 2019-10-26 PROCEDURE — 25000125 ZZHC RX 250: Performed by: NURSE ANESTHETIST, CERTIFIED REGISTERED

## 2019-10-26 PROCEDURE — 94640 AIRWAY INHALATION TREATMENT: CPT

## 2019-10-26 PROCEDURE — 36000058 ZZH SURGERY LEVEL 3 EA 15 ADDTL MIN: Performed by: FAMILY MEDICINE

## 2019-10-26 PROCEDURE — 25000128 H RX IP 250 OP 636: Performed by: FAMILY MEDICINE

## 2019-10-26 PROCEDURE — 25000132 ZZH RX MED GY IP 250 OP 250 PS 637: Performed by: FAMILY MEDICINE

## 2019-10-26 PROCEDURE — 25000125 ZZHC RX 250: Performed by: FAMILY MEDICINE

## 2019-10-26 PROCEDURE — 25000128 H RX IP 250 OP 636: Performed by: ANESTHESIOLOGY

## 2019-10-26 PROCEDURE — 80361 OPIATES 1 OR MORE: CPT | Performed by: FAMILY MEDICINE

## 2019-10-26 PROCEDURE — 27210794 ZZH OR GENERAL SUPPLY STERILE: Performed by: FAMILY MEDICINE

## 2019-10-26 PROCEDURE — C1765 ADHESION BARRIER: HCPCS | Performed by: FAMILY MEDICINE

## 2019-10-26 PROCEDURE — 25000125 ZZHC RX 250: Performed by: ANESTHESIOLOGY

## 2019-10-26 PROCEDURE — 80307 DRUG TEST PRSMV CHEM ANLYZR: CPT | Performed by: FAMILY MEDICINE

## 2019-10-26 PROCEDURE — 25800030 ZZH RX IP 258 OP 636: Performed by: NURSE ANESTHETIST, CERTIFIED REGISTERED

## 2019-10-26 PROCEDURE — 40000275 ZZH STATISTIC RCP TIME EA 10 MIN

## 2019-10-26 PROCEDURE — 59515 CESAREAN DELIVERY: CPT | Performed by: FAMILY MEDICINE

## 2019-10-26 PROCEDURE — 25000132 ZZH RX MED GY IP 250 OP 250 PS 637

## 2019-10-26 PROCEDURE — 25800030 ZZH RX IP 258 OP 636: Performed by: FAMILY MEDICINE

## 2019-10-26 PROCEDURE — 25000128 H RX IP 250 OP 636: Performed by: NURSE ANESTHETIST, CERTIFIED REGISTERED

## 2019-10-26 PROCEDURE — 88307 TISSUE EXAM BY PATHOLOGIST: CPT | Mod: 26 | Performed by: FAMILY MEDICINE

## 2019-10-26 PROCEDURE — 12000000 ZZH R&B MED SURG/OB

## 2019-10-26 PROCEDURE — 36000056 ZZH SURGERY LEVEL 3 1ST 30 MIN: Performed by: FAMILY MEDICINE

## 2019-10-26 RX ORDER — PHENYLEPHRINE HCL IN 0.9% NACL 1 MG/10 ML
100 SYRINGE (ML) INTRAVENOUS EVERY 5 MIN PRN
Status: DISCONTINUED | OUTPATIENT
Start: 2019-10-26 | End: 2019-10-26

## 2019-10-26 RX ORDER — OXYCODONE HYDROCHLORIDE 5 MG/1
5-10 TABLET ORAL
Status: DISCONTINUED | OUTPATIENT
Start: 2019-10-26 | End: 2019-10-29 | Stop reason: HOSPADM

## 2019-10-26 RX ORDER — LIDOCAINE 40 MG/G
CREAM TOPICAL
Status: DISCONTINUED | OUTPATIENT
Start: 2019-10-26 | End: 2019-10-26

## 2019-10-26 RX ORDER — HYDROCORTISONE 2.5 %
CREAM (GRAM) TOPICAL 3 TIMES DAILY PRN
Status: DISCONTINUED | OUTPATIENT
Start: 2019-10-26 | End: 2019-10-29 | Stop reason: HOSPADM

## 2019-10-26 RX ORDER — DEXAMETHASONE SODIUM PHOSPHATE 4 MG/ML
INJECTION, SOLUTION INTRA-ARTICULAR; INTRALESIONAL; INTRAMUSCULAR; INTRAVENOUS; SOFT TISSUE PRN
Status: DISCONTINUED | OUTPATIENT
Start: 2019-10-26 | End: 2019-10-26

## 2019-10-26 RX ORDER — ONDANSETRON 2 MG/ML
4 INJECTION INTRAMUSCULAR; INTRAVENOUS EVERY 6 HOURS PRN
Status: DISCONTINUED | OUTPATIENT
Start: 2019-10-26 | End: 2019-10-29 | Stop reason: HOSPADM

## 2019-10-26 RX ORDER — LIDOCAINE HCL/EPINEPHRINE/PF 2%-1:200K
VIAL (ML) INJECTION PRN
Status: DISCONTINUED | OUTPATIENT
Start: 2019-10-26 | End: 2019-10-26

## 2019-10-26 RX ORDER — AMOXICILLIN 250 MG
2 CAPSULE ORAL 2 TIMES DAILY PRN
Status: DISCONTINUED | OUTPATIENT
Start: 2019-10-26 | End: 2019-10-29 | Stop reason: HOSPADM

## 2019-10-26 RX ORDER — MORPHINE SULFATE 0.5 MG/ML
2.5 INJECTION, SOLUTION EPIDURAL; INTRATHECAL; INTRAVENOUS ONCE
Status: DISCONTINUED | OUTPATIENT
Start: 2019-10-26 | End: 2019-10-29 | Stop reason: HOSPADM

## 2019-10-26 RX ORDER — ALBUTEROL SULFATE 0.83 MG/ML
2.5 SOLUTION RESPIRATORY (INHALATION) EVERY 6 HOURS PRN
Status: DISCONTINUED | OUTPATIENT
Start: 2019-10-26 | End: 2019-10-29 | Stop reason: HOSPADM

## 2019-10-26 RX ORDER — ABACAVIR AND LAMIVUDINE 600; 300 MG/1; MG/1
1 TABLET, FILM COATED ORAL DAILY
Status: DISCONTINUED | OUTPATIENT
Start: 2019-10-26 | End: 2019-10-29 | Stop reason: HOSPADM

## 2019-10-26 RX ORDER — ACETAMINOPHEN 325 MG/1
975 TABLET ORAL EVERY 8 HOURS
Status: COMPLETED | OUTPATIENT
Start: 2019-10-26 | End: 2019-10-28

## 2019-10-26 RX ORDER — FENTANYL CITRATE 50 UG/ML
100 INJECTION, SOLUTION INTRAMUSCULAR; INTRAVENOUS ONCE
Status: DISCONTINUED | OUTPATIENT
Start: 2019-10-26 | End: 2019-10-29 | Stop reason: HOSPADM

## 2019-10-26 RX ORDER — ACETAMINOPHEN 325 MG/1
650 TABLET ORAL EVERY 4 HOURS PRN
Status: DISCONTINUED | OUTPATIENT
Start: 2019-10-29 | End: 2019-10-26

## 2019-10-26 RX ORDER — SODIUM CHLORIDE 9 MG/ML
INJECTION, SOLUTION INTRAVENOUS CONTINUOUS PRN
Status: DISCONTINUED | OUTPATIENT
Start: 2019-10-26 | End: 2019-10-26

## 2019-10-26 RX ORDER — NALOXONE HYDROCHLORIDE 0.4 MG/ML
.1-.4 INJECTION, SOLUTION INTRAMUSCULAR; INTRAVENOUS; SUBCUTANEOUS
Status: DISCONTINUED | OUTPATIENT
Start: 2019-10-26 | End: 2019-10-29 | Stop reason: HOSPADM

## 2019-10-26 RX ORDER — DEXTROSE, SODIUM CHLORIDE, SODIUM LACTATE, POTASSIUM CHLORIDE, AND CALCIUM CHLORIDE 5; .6; .31; .03; .02 G/100ML; G/100ML; G/100ML; G/100ML; G/100ML
INJECTION, SOLUTION INTRAVENOUS CONTINUOUS
Status: DISCONTINUED | OUTPATIENT
Start: 2019-10-26 | End: 2019-10-29 | Stop reason: HOSPADM

## 2019-10-26 RX ORDER — CEFAZOLIN SODIUM 1 G/3ML
1 INJECTION, POWDER, FOR SOLUTION INTRAMUSCULAR; INTRAVENOUS SEE ADMIN INSTRUCTIONS
Status: DISCONTINUED | OUTPATIENT
Start: 2019-10-26 | End: 2019-10-26

## 2019-10-26 RX ORDER — ACETAMINOPHEN 325 MG/1
650 TABLET ORAL EVERY 4 HOURS PRN
Status: DISCONTINUED | OUTPATIENT
Start: 2019-10-29 | End: 2019-10-29 | Stop reason: HOSPADM

## 2019-10-26 RX ORDER — PHENYLEPHRINE HYDROCHLORIDE 10 MG/ML
INJECTION INTRAVENOUS PRN
Status: DISCONTINUED | OUTPATIENT
Start: 2019-10-26 | End: 2019-10-26

## 2019-10-26 RX ORDER — LANOLIN 100 %
OINTMENT (GRAM) TOPICAL
Status: DISCONTINUED | OUTPATIENT
Start: 2019-10-26 | End: 2019-10-29 | Stop reason: HOSPADM

## 2019-10-26 RX ORDER — SIMETHICONE 80 MG
80 TABLET,CHEWABLE ORAL 4 TIMES DAILY PRN
Status: DISCONTINUED | OUTPATIENT
Start: 2019-10-26 | End: 2019-10-29 | Stop reason: HOSPADM

## 2019-10-26 RX ORDER — SODIUM CHLORIDE, SODIUM LACTATE, POTASSIUM CHLORIDE, CALCIUM CHLORIDE 600; 310; 30; 20 MG/100ML; MG/100ML; MG/100ML; MG/100ML
INJECTION, SOLUTION INTRAVENOUS CONTINUOUS
Status: DISCONTINUED | OUTPATIENT
Start: 2019-10-26 | End: 2019-10-26

## 2019-10-26 RX ORDER — NALBUPHINE HYDROCHLORIDE 10 MG/ML
2.5-5 INJECTION, SOLUTION INTRAMUSCULAR; INTRAVENOUS; SUBCUTANEOUS EVERY 6 HOURS PRN
Status: DISCONTINUED | OUTPATIENT
Start: 2019-10-26 | End: 2019-10-26

## 2019-10-26 RX ORDER — CITRIC ACID/SODIUM CITRATE 334-500MG
30 SOLUTION, ORAL ORAL
Status: COMPLETED | OUTPATIENT
Start: 2019-10-26 | End: 2019-10-26

## 2019-10-26 RX ORDER — OXYTOCIN/0.9 % SODIUM CHLORIDE 30/500 ML
PLASTIC BAG, INJECTION (ML) INTRAVENOUS PRN
Status: DISCONTINUED | OUTPATIENT
Start: 2019-10-26 | End: 2019-10-26

## 2019-10-26 RX ORDER — ACETAMINOPHEN 325 MG/1
975 TABLET ORAL ONCE
Status: DISCONTINUED | OUTPATIENT
Start: 2019-10-26 | End: 2019-10-26

## 2019-10-26 RX ORDER — CITRIC ACID/SODIUM CITRATE 334-500MG
SOLUTION, ORAL ORAL
Status: COMPLETED
Start: 2019-10-26 | End: 2019-10-26

## 2019-10-26 RX ORDER — FENTANYL CITRATE 50 UG/ML
INJECTION, SOLUTION INTRAMUSCULAR; INTRAVENOUS PRN
Status: DISCONTINUED | OUTPATIENT
Start: 2019-10-26 | End: 2019-10-26

## 2019-10-26 RX ORDER — HYDROMORPHONE HYDROCHLORIDE 1 MG/ML
.3-.5 INJECTION, SOLUTION INTRAMUSCULAR; INTRAVENOUS; SUBCUTANEOUS EVERY 30 MIN PRN
Status: DISCONTINUED | OUTPATIENT
Start: 2019-10-26 | End: 2019-10-29 | Stop reason: HOSPADM

## 2019-10-26 RX ORDER — LIDOCAINE 40 MG/G
CREAM TOPICAL
Status: DISCONTINUED | OUTPATIENT
Start: 2019-10-26 | End: 2019-10-29 | Stop reason: HOSPADM

## 2019-10-26 RX ORDER — ACETAMINOPHEN 325 MG/1
975 TABLET ORAL EVERY 8 HOURS
Status: DISCONTINUED | OUTPATIENT
Start: 2019-10-26 | End: 2019-10-26

## 2019-10-26 RX ORDER — SCOLOPAMINE TRANSDERMAL SYSTEM 1 MG/1
1 PATCH, EXTENDED RELEASE TRANSDERMAL ONCE
Status: DISCONTINUED | OUTPATIENT
Start: 2019-10-26 | End: 2019-10-26

## 2019-10-26 RX ORDER — OXYTOCIN/0.9 % SODIUM CHLORIDE 30/500 ML
340 PLASTIC BAG, INJECTION (ML) INTRAVENOUS CONTINUOUS PRN
Status: DISCONTINUED | OUTPATIENT
Start: 2019-10-26 | End: 2019-10-29 | Stop reason: HOSPADM

## 2019-10-26 RX ORDER — IBUPROFEN 800 MG/1
800 TABLET, FILM COATED ORAL EVERY 6 HOURS PRN
Status: DISCONTINUED | OUTPATIENT
Start: 2019-10-27 | End: 2019-10-29 | Stop reason: HOSPADM

## 2019-10-26 RX ORDER — KETOROLAC TROMETHAMINE 30 MG/ML
30 INJECTION, SOLUTION INTRAMUSCULAR; INTRAVENOUS EVERY 6 HOURS
Status: COMPLETED | OUTPATIENT
Start: 2019-10-26 | End: 2019-10-27

## 2019-10-26 RX ORDER — ALBUTEROL SULFATE 90 UG/1
2 AEROSOL, METERED RESPIRATORY (INHALATION) 4 TIMES DAILY PRN
Status: DISCONTINUED | OUTPATIENT
Start: 2019-10-26 | End: 2019-10-29 | Stop reason: HOSPADM

## 2019-10-26 RX ORDER — OXYCODONE HYDROCHLORIDE 5 MG/1
5-10 TABLET ORAL
Status: DISCONTINUED | OUTPATIENT
Start: 2019-10-26 | End: 2019-10-26

## 2019-10-26 RX ORDER — AMOXICILLIN 250 MG
1 CAPSULE ORAL 2 TIMES DAILY PRN
Status: DISCONTINUED | OUTPATIENT
Start: 2019-10-26 | End: 2019-10-29 | Stop reason: HOSPADM

## 2019-10-26 RX ORDER — CEFAZOLIN SODIUM 2 G/100ML
2 INJECTION, SOLUTION INTRAVENOUS
Status: COMPLETED | OUTPATIENT
Start: 2019-10-26 | End: 2019-10-26

## 2019-10-26 RX ORDER — OXYTOCIN/0.9 % SODIUM CHLORIDE 30/500 ML
100 PLASTIC BAG, INJECTION (ML) INTRAVENOUS CONTINUOUS
Status: DISCONTINUED | OUTPATIENT
Start: 2019-10-26 | End: 2019-10-29 | Stop reason: HOSPADM

## 2019-10-26 RX ORDER — MISOPROSTOL 200 UG/1
800 TABLET ORAL
Status: DISCONTINUED | OUTPATIENT
Start: 2019-10-26 | End: 2019-10-29 | Stop reason: HOSPADM

## 2019-10-26 RX ORDER — MORPHINE SULFATE 1 MG/ML
INJECTION, SOLUTION EPIDURAL; INTRATHECAL; INTRAVENOUS PRN
Status: DISCONTINUED | OUTPATIENT
Start: 2019-10-26 | End: 2019-10-26

## 2019-10-26 RX ORDER — OXYTOCIN 10 [USP'U]/ML
10 INJECTION, SOLUTION INTRAMUSCULAR; INTRAVENOUS
Status: DISCONTINUED | OUTPATIENT
Start: 2019-10-26 | End: 2019-10-29 | Stop reason: HOSPADM

## 2019-10-26 RX ORDER — BISACODYL 10 MG
10 SUPPOSITORY, RECTAL RECTAL DAILY PRN
Status: DISCONTINUED | OUTPATIENT
Start: 2019-10-28 | End: 2019-10-29 | Stop reason: HOSPADM

## 2019-10-26 RX ORDER — NALOXONE HYDROCHLORIDE 0.4 MG/ML
.1-.4 INJECTION, SOLUTION INTRAMUSCULAR; INTRAVENOUS; SUBCUTANEOUS
Status: DISCONTINUED | OUTPATIENT
Start: 2019-10-26 | End: 2019-10-26

## 2019-10-26 RX ADMIN — OXYTOCIN-SODIUM CHLORIDE 0.9% IV SOLN 30 UNIT/500ML 500 ML: 30-0.9/5 SOLUTION at 03:19

## 2019-10-26 RX ADMIN — MORPHINE SULFATE 2.5 MG: 1 INJECTION EPIDURAL; INTRATHECAL; INTRAVENOUS at 03:00

## 2019-10-26 RX ADMIN — ONDANSETRON 4 MG: 2 INJECTION INTRAMUSCULAR; INTRAVENOUS at 03:00

## 2019-10-26 RX ADMIN — FENTANYL CITRATE 100 MCG: 50 INJECTION, SOLUTION INTRAMUSCULAR; INTRAVENOUS at 03:00

## 2019-10-26 RX ADMIN — PHENYLEPHRINE HYDROCHLORIDE 100 MCG: 10 INJECTION INTRAVENOUS at 03:29

## 2019-10-26 RX ADMIN — CEFAZOLIN SODIUM 2 G: 2 INJECTION, SOLUTION INTRAVENOUS at 03:04

## 2019-10-26 RX ADMIN — ACETAMINOPHEN 975 MG: 325 TABLET, FILM COATED ORAL at 13:54

## 2019-10-26 RX ADMIN — SODIUM CHLORIDE: 9 INJECTION, SOLUTION INTRAVENOUS at 02:43

## 2019-10-26 RX ADMIN — SODIUM CITRATE AND CITRIC ACID MONOHYDRATE 30 ML: 500; 334 SOLUTION ORAL at 02:44

## 2019-10-26 RX ADMIN — ACETAMINOPHEN 975 MG: 325 TABLET, FILM COATED ORAL at 21:51

## 2019-10-26 RX ADMIN — KETOROLAC TROMETHAMINE 30 MG: 30 INJECTION, SOLUTION INTRAMUSCULAR at 11:29

## 2019-10-26 RX ADMIN — DOLUTEGRAVIR SODIUM 50 MG: 50 TABLET, FILM COATED ORAL at 21:50

## 2019-10-26 RX ADMIN — SODIUM CHLORIDE, SODIUM LACTATE, POTASSIUM CHLORIDE, CALCIUM CHLORIDE AND DEXTROSE MONOHYDRATE: 5; 600; 310; 30; 20 INJECTION, SOLUTION INTRAVENOUS at 10:04

## 2019-10-26 RX ADMIN — MIDAZOLAM 2 MG: 1 INJECTION INTRAMUSCULAR; INTRAVENOUS at 03:00

## 2019-10-26 RX ADMIN — KETOROLAC TROMETHAMINE 30 MG: 30 INJECTION, SOLUTION INTRAMUSCULAR at 18:07

## 2019-10-26 RX ADMIN — LIDOCAINE HYDROCHLORIDE,EPINEPHRINE BITARTRATE 20 ML: 20; .005 INJECTION, SOLUTION EPIDURAL; INFILTRATION; INTRACAUDAL; PERINEURAL at 03:00

## 2019-10-26 RX ADMIN — Medication 30 ML: at 02:44

## 2019-10-26 RX ADMIN — ZIDOVUDINE 1 MG/KG/HR: 10 INJECTION, SOLUTION INTRAVENOUS at 01:57

## 2019-10-26 RX ADMIN — KETOROLAC TROMETHAMINE 30 MG: 30 INJECTION, SOLUTION INTRAMUSCULAR at 05:35

## 2019-10-26 RX ADMIN — Medication 2 MILLI-UNITS/MIN: at 01:36

## 2019-10-26 RX ADMIN — ABACAVIR SULFATE AND LAMIVUDINE 1 TABLET: 600; 300 TABLET, FILM COATED ORAL at 21:50

## 2019-10-26 RX ADMIN — Medication 100 ML/HR: at 05:12

## 2019-10-26 RX ADMIN — ALBUTEROL SULFATE 2.5 MG: 2.5 SOLUTION RESPIRATORY (INHALATION) at 04:53

## 2019-10-26 RX ADMIN — ACETAMINOPHEN 975 MG: 325 TABLET, FILM COATED ORAL at 05:35

## 2019-10-26 RX ADMIN — SENNOSIDES AND DOCUSATE SODIUM 1 TABLET: 8.6; 5 TABLET ORAL at 21:51

## 2019-10-26 RX ADMIN — PHENYLEPHRINE HYDROCHLORIDE 200 MCG: 10 INJECTION INTRAVENOUS at 03:23

## 2019-10-26 RX ADMIN — DEXAMETHASONE SODIUM PHOSPHATE 8 MG: 4 INJECTION, SOLUTION INTRA-ARTICULAR; INTRALESIONAL; INTRAMUSCULAR; INTRAVENOUS; SOFT TISSUE at 03:00

## 2019-10-26 RX ADMIN — PHENYLEPHRINE HYDROCHLORIDE 200 MCG: 10 INJECTION INTRAVENOUS at 03:11

## 2019-10-26 NOTE — ANESTHESIA POSTPROCEDURE EVALUATION
Patient: Priscila Betancourt    Procedure(s):   SECTION    Diagnosis:* No pre-op diagnosis entered *  Diagnosis Additional Information: FTP with non rassuring FHT  Dr. Ricardo    Anesthesia Type:  Epidural    Note:  Anesthesia Post Evaluation    Patient location during evaluation: OB PACU  Patient participation: Able to participate in evaluation but full recovery from regional anesthesia has not yet ocurrred but is anticipated to occur within 48 hours  Level of consciousness: awake  Pain management: adequate  multimodal analgesia used between 6 hours prior to anesthesia start to PACU dischargeAirway patency: patent  Cardiovascular status: acceptable  Respiratory status: acceptable  Hydration status: acceptable  PONV: none             Last vitals:  Vitals:    10/25/19 1939 10/25/19 2240 10/26/19 0040   BP: 120/67 114/71 112/87   Pulse:      Resp: 18     Temp: 98.2  F (36.8  C)  97.7  F (36.5  C)   SpO2:            Electronically Signed By: Felipe Fall MD  2019  4:00 AM

## 2019-10-26 NOTE — PROVIDER NOTIFICATION
10/25/19 6302   Provider Notification   Provider Name/Title Davion   Method of Notification At Bedside   Request Evaluate in Person   Notification Reason SVE;Membrane Status;Decels   Dr. Ricardo at bedside, RN paged d/t amniotic sac protruding outside vagina and intact, SROM occurred before provider entered room for clear fluid. Pt had prolonged decels after AROM, repositioned and O2 placed, returned to baseline, MD aware.

## 2019-10-26 NOTE — PROVIDER NOTIFICATION
10/26/19 0125   Provider Notification   Provider Name/Title Davion   Method of Notification At Bedside   Request Evaluate in Person   Notification Reason SVE;Uterine Activity   Dr. Ricardo at bedside for update and SVE, SVE now 6/90/-1, MD states cervix now swollen. FHT remains cat 2, minimal variability with no accels. MD gave orders to start Pitocin and will recheck cervix @ 0200.

## 2019-10-26 NOTE — PLAN OF CARE
Patient informed of need for urine tox due to living in homeless shelter.  Verbal consent obtained and maternal urine sent. Umbilical cord segment has been sent for toxicology.

## 2019-10-26 NOTE — PROVIDER NOTIFICATION
10/26/19 0230   Provider Notification   Provider Name/Title Davion   Method of Notification At Bedside   Request Evaluate in Person   Notification Reason Labor Status;Status Update   Dr. Ricardo at bedside, pt agreeable to C section. Will place orders and begin prep. Plan is to be on OR in 30 minutes.

## 2019-10-26 NOTE — ANESTHESIA PREPROCEDURE EVALUATION
Anesthesia Pre-Procedure Evaluation    Patient: Priscila Betancourt   MRN: [id number] : 1995          Preoperative Diagnosis: * No pre-op diagnosis entered *    Procedure(s):   SECTION    Past Medical History:   Diagnosis Date     Asthma     Hosp as child, no intubations     Asymptomatic human immunodeficiency virus (HIV) infection status (H) 2019     Past Surgical History:   Procedure Laterality Date     COLONOSCOPY      Rectal bleeding due to chronic constipation     Anesthesia Evaluation     . Pt has had prior anesthetic. Type: General and Regional    No history of anesthetic complications          ROS/MED HX    ENT/Pulmonary:     (+)Intermittent asthma Treatment: Inhaler prn,  , . .    Neurologic:  - neg neurologic ROS     Cardiovascular:  - neg cardiovascular ROS       METS/Exercise Tolerance:     Hematologic:  - neg hematologic  ROS       Musculoskeletal:  - neg musculoskeletal ROS       GI/Hepatic:  - neg GI/hepatic ROS       Renal/Genitourinary:  - ROS Renal section negative       Endo:     (+) Obesity, .      Psychiatric:  - neg psychiatric ROS       Infectious Disease:   (+) HIV,       Malignancy:      - no malignancy   Other: Comment: Non reassuring FHT for C/S   (+) Possibly pregnant                         Physical Exam  Normal systems: cardiovascular, pulmonary and dental    Airway   Mallampati: II  TM distance: >3 FB  Neck ROM: full    Dental     Cardiovascular   Rhythm and rate: regular and normal      Pulmonary    breath sounds clear to auscultation    Other findings: Lab Test        10/25/19     09/03/19     08/13/19     06/19/19                       0820          1538          1334          1502          WBC           --          6.3          6.7          6.2           HGB          12.9         10.0*        9.5*         10.2*         MCV           --          87           84           84            PLT           --          294          311          283            No lab results  "found.              Lab Results   Component Value Date    WBC 6.3 09/03/2019    HGB 12.9 10/25/2019    HCT 31.7 (L) 09/03/2019     09/03/2019    ALT 13 09/03/2019    AST 10 09/03/2019    HCG Positive (A) 06/19/2019       Preop Vitals  BP Readings from Last 3 Encounters:   10/26/19 112/87   09/23/19 120/66   09/09/19 102/62    Pulse Readings from Last 3 Encounters:   10/25/19 89   06/27/19 95   05/27/11 91      Resp Readings from Last 3 Encounters:   10/25/19 18   06/27/19 16   05/16/11 24    SpO2 Readings from Last 3 Encounters:   10/25/19 99%   06/27/19 100%   05/27/11 98%      Temp Readings from Last 1 Encounters:   10/26/19 97.7  F (36.5  C) (Oral)    Ht Readings from Last 1 Encounters:   10/25/19 1.702 m (5' 7\")      Wt Readings from Last 1 Encounters:   10/25/19 93.4 kg (206 lb)    Estimated body mass index is 32.26 kg/m  as calculated from the following:    Height as of this encounter: 1.702 m (5' 7\").    Weight as of this encounter: 93.4 kg (206 lb).       Anesthesia Plan      History & Physical Review  History and physical reviewed and following examination; no interval change.    ASA Status:  2 emergent.    NPO Status:  > 8 hours    Plan for Epidural   PONV prophylaxis:  Ondansetron (or other 5HT-3) and Dexamethasone or Solumedrol       Postoperative Care  Postoperative pain management:  IV analgesics, Oral pain medications and Multi-modal analgesia.      Consents  Anesthetic plan, risks, benefits and alternatives discussed with:  Patient.  Use of blood products discussed: No .   .                 Felipe Fall MD                    .  "

## 2019-10-26 NOTE — PROGRESS NOTES
"S:  Ana   O:/67   Pulse 89   Temp 98.2  F (36.8  C) (Oral)   Resp 18   Ht 1.702 m (5' 7\")   Wt 93.4 kg (206 lb)   LMP 2019 (Exact Date)   SpO2 99%   BMI 32.26 kg/m     : 8.5 cm/ 90 /-2, BBOW @ introitus    FHT's Category 2 with accelerations, minimal to moderate variability    A:  24 year old y/o  @ 39w4d  wks EGA with labor, polyhydramnios, HIV +, undetectable levels and GBS unknown  P:    PCN for GBS unknown   IV zidovudine for HIV + with HIV quant last done > 6 weeks from today   Continue oral HIV+ medications   Minimize interventions, no FSE, no IUPC, no operative vaginal delivery  AROM per MFM allowed but not in early labor, patient prefers to await SROM,   Polyhydramnios: if and when needed will arom      Dr. Ema Ricardo, DO    Obstetrics and Gynecology  [facility] [place]         "

## 2019-10-26 NOTE — PLAN OF CARE
Data: Priscila Betancourt transferred to postpartum via cart at 0650. Baby transferred via parent's arms. Urine tox screen collected after C section, resulted positive for opioids. SW consult placed.  Action: Receiving unit notified of transfer: Yes. Patient and family notified of room change.  Belongings sent to receiving unit. Accompanied by Registered Nurse. Oriented patient to surroundings. Call light within reach. ID bands double-checked with receiving RN.  Response: Patient tolerated transfer and is stable.

## 2019-10-26 NOTE — PLAN OF CARE
Report and care received at 0730. Oriented mother and grandmother to room, safety, etc. Patient has denied pain, moved well in bed, visitors this morning and slept this afternoon. Had infant skin to skin. Large amount of urine output, tolerating po well. Anticipate discharge POD 3. Denies cough, lungs are clear, and declined neb treatment.

## 2019-10-26 NOTE — ANESTHESIA CARE TRANSFER NOTE
Patient: Priscila Betancourt    Procedure(s):   SECTION    Diagnosis: * No pre-op diagnosis entered *  Diagnosis Additional Information: FTP with non rassuring FHT  Dr. Ricardo    Anesthesia Type:   Epidural     Note:  Airway :Room Air  Patient transferred to:Labor and Delivery  Handoff Report: Identifed the Patient, Identified the Reponsible Provider, Reviewed the pertinent medical history, Discussed the surgical course, Reviewed Intra-OP anesthesia mangement and issues during anesthesia, Set expectations for post-procedure period and Allowed opportunity for questions and acknowledgement of understanding      Vitals: (Last set prior to Anesthesia Care Transfer)    CRNA VITALS  10/26/2019 0322 - 10/26/2019 0403      10/26/2019             SpO2:  99 %                Electronically Signed By: NASRA Hays CRNA  2019  4:03 AM

## 2019-10-26 NOTE — PROGRESS NOTES
"S:  Ana   O:/87   Pulse 89   Temp 97.7  F (36.5  C) (Oral)   Resp 18   Ht 1.702 m (5' 7\")   Wt 93.4 kg (206 lb)   LMP 2019 (Exact Date)   SpO2 99%   BMI 32.26 kg/m     : / -1     FHT's Category 2 with baseline 150s, now with intermittent late and variable decelerations    A:  24 year old y/o  @ 39w4d  wks EGA with labor, polyhydramnios, HIV +, undetectable levels and GBS unknown  Cervix now contracted to 6 cm, but lower station of fetal head.   Discussed proceeding with  section vs starting pitocin.   She declines  section, will start pitocin and recheck in 30 minutes.   P:    1) PCN for GBS unknown   2) HIV +: IV zidovudine for HIV + with HIV quant last done > 6 weeks from today               Continue oral HIV+ medications               Minimize interventions, no FSE, no IUPC, no operative vaginal delivery              srom occurred, cervix contracted from 8.5 cm to 8 cm previously and now               Cervix is 6 cm with descent of fetal head, recheck in 45 minutes shows no    Cervical change, and now with intermittent late declerations,      Recommend  section, she will discuss with her mother      3) Polyhydramnios noted, clear fluid  4) fetal status overall reassuring, continuous monitoring     Dr. Ema Ricardo, DO    OB/GYN   [facility]    "

## 2019-10-26 NOTE — PROVIDER NOTIFICATION
10/26/19 0215   Provider Notification   Provider Name/Title Davion   Method of Notification At Bedside   Request Evaluate in Person   Notification Reason SVE;Status Update;Labor Status;Decels   Dr. Ricardo at bedside for SVE recheck, remains the same at 6/90/-1. FHT cat 2, minimal to absent variability, no accels and no decels after beginning Pit. MD discussed with pt that her recommendation is C section. Pt declining at this time but wants time to discuss with mom and will call us back into room when decision is made.

## 2019-10-26 NOTE — PROGRESS NOTES
"S:  Ana   O:/87   Pulse 89   Temp 97.7  F (36.5  C) (Oral)   Resp 18   Ht 1.702 m (5' 7\")   Wt 93.4 kg (206 lb)   LMP 2019 (Exact Date)   SpO2 99%   BMI 32.26 kg/m     : / -1    FHT's Category 2 with baseline minimal variability no acceleration,     A:  24 year old y/o  @ 39w4d  wks EGA with labor, polyhydramnios, HIV +, undetectable levels and GBS unknown  Cervix now contracted to 6 cm, but lower station of fetal head.   Discussed proceeding with  section vs starting pitocin.   She declines  section, will start pitocin and recheck in 30 minutes.   P:    1) PCN for GBS unknown   2) HIV +: IV zidovudine for HIV + with HIV quant last done > 6 weeks from today    Continue oral HIV+ medications    Minimize interventions, no FSE, no IUPC, no operative vaginal delivery   srom occurred, cervix contracted from 8.5 cm to 8 cm previously and now    Cervix is 6 cm with descent of fetal head, continue with labor, recheck in 30 minutes    If no change recommend proceeding with  section   3) Polyhydramnios noted, clear fluid  4) fetal status overall reassuring, continuous monitoring     Dr. Ema Ricardo, DO    OB/GYN   [facility]    "

## 2019-10-26 NOTE — OP NOTE
PREOPERATIVE DIAGNOSIS: A 24 year old-year-old  at 39w5d weeks estimated gestational age admitted for labor with polyhydramnios, HIV positive, arrest of dilation, category 2 tracing.  POSTOPERATIVE DIAGNOSES: A 24 year old-year-old  at 39w5d weeks estimated gestational age admitted for labor with polyhydramnios, HIV positive, arrest of dilation, category 2 tracing.  PROCEDURE:   1. Primary low transverse  section.   2. Seprafilm placement for adhesiolysis.   COMPLICATIONS: None apparent at time of procedure.   ESTIMATED BLOOD LOSS:422  mL.   SURGEONS: Ema Ricardo DO.   INDICATIONS: A 24 year old-year-old  at 39w5d weeks estimated gestational age admitted for labor with polyhydramnios, HIV positive, arrest of dilation, category 2 tracing.  Patient signed consent for primary  section, risks benefits, alternatives are discussed with the patient.    OUTCOME: female infant 6 lb 3.1 oz (2810 g) apgars 7 at 1 minute and 8 at 5 minutes. Cord gases pending.  Left occiput posterior position.   Findings:   Normal uterus, tubes and ovaries.   DETAILS OF PROCEDURE: After informed consent was signed, the patient was placed in dorsal supine position, epidural placed for anesthesia. Fetal heart tones were obtained and found to be in 150s. The patient was prepped and draped in normal sterile fashion and a time out was performed. Adequate anesthesia was reported per patient after the epidural was dosed to a surgical level.  A pfannensteil incision was then carried down to the underlying fascia and incised in the midline. The fascia is dissected off the rectus muscles superiorly and inferiorly. Blunt/sharp dissection of the peritoneum was performed and blunt stretching of the muscles was perfomed. The uterus was visualized.  An koby retractor is placed. The peritoneum was visualized, bladder blade was placed, The vesicouterine peritoneum incised and a bladder flap created. The bladder blade was  replaced. The lower uterine segment was incised with a scalpel. Blunt stretching was performed and the baby delivered atraumatically.  The nose and mouth were bulb suctioned. The cord was clamped and cut after 1 minute.  Infant was taken over to the waiting  staff. The placenta spontaneously delivered. The uterus was cleared of all clots and debris. The placenta was examined and found to be complete.  The lower uterine segment was reapproximated with 0 Monocryl in a running locked fashion. A second layer of the same suture was used for imbrication. The bladder flap was recreated using 3-0 Monocryl. The koby retractor is removed. Irrigation was performed. Seprafilm was placed over the bladder flap. Hemostasis was noted. The peritoneum was closed with 3-0 monocryl. The rectus muscles were reapproximated in the midline and closed with several horizontal mattress sutures of 3-0 monocryl  Hemostasis was assured with Bovie cautery on the rectus muscles, and Seprafilm was placed over it. The fascia was reapproximated with 0 looped PDS in a running fashion with good reapproximation. A subcuticular stitch using 4-0 Monocryl was used to reapproximate the skin. Steri-Strips, telfa, and tegaderm was applied. The patient tolerated the procedure well. Sponge, lap and needle counts were correct x2.   CANDICE GARRISON DO

## 2019-10-26 NOTE — PROGRESS NOTES
"S:  Ana   O:/67   Pulse 89   Temp 98.2  F (36.8  C) (Oral)   Resp 18   Ht 1.702 m (5' 7\")   Wt 93.4 kg (206 lb)   LMP 2019 (Exact Date)   SpO2 99%   BMI 32.26 kg/m     : /-2 srom     FHT's Category 2 prolonged deceleration with srom, with recovery   Minimal to moderate variability     A:  24 year old y/o  @ 39w4d  wks EGA with labor, polyhydramnios, HIV +, undetectable levels and GBS unknown  P:    PCN for GBS unknown   IV zidovudine for HIV + with HIV quant last done > 6 weeks from today   Continue oral HIV+ medications   Minimize interventions, no FSE, no IUPC, no operative vaginal delivery  srom occurred, cervix contracted from 8.5 cm to 8 cm, continue with labor  Polyhydramnios noted, clear fluid     Dr. Ema Ricardo, DO    Obstetrics and Gynecology  [facility]     "

## 2019-10-26 NOTE — PROVIDER NOTIFICATION
10/25/19 2240   Provider Notification   Provider Name/Title Davion   Method of Notification At Bedside   Request Evaluate in Person   Notification Reason SVE;Membrane Status;Decels

## 2019-10-26 NOTE — L&D DELIVERY NOTE
OB  Delivery Note      Priscila Betancourt MRN# [id number]   Age: 24 year old YOB: 1995       GA: 39w5d  GP:   Labor Complications: None   EBL:    mL  Delivery QBL:    Delivery Type: , Low Transverse   ROM to Delivery Time: (Delivered) Hours: 4 Minutes: 43     1 Minute 5 Minute 10 Minute   Apgar Totals: 7    8          Personel Present:        Details    Pre-Op Diagnosis: 1. Intrauterine pregnancy at 39w5d  2. Arrest of dilation  3. Category 2 tracing   4. HIV positive   5. Polyhydramnios   Post-Op Diagnosis: 1. SAME   Indications:       Procedure:    , Low Transverse  via    incision   Anesthesia: Epidural      Informed Consent:  The risks, benefits, complications, and alternatives were discussed with the patient. The patient understood that the risks of  section include, but are not limited to: injury to nearby structures or organs, infection, blood loss and possible need for transfusion, and potential need for more surgery including hysterectomy. The patient stated understanding and desired to proceed. All questions were answered. The site of surgery was properly noted and marked. The patient was identified as Priscila Betancourt and the procedure verified as a  delivery. A Time Out was held and the above information confirmed.    Procedure Details: see operative note     Labor Length    3rd Stage (hrs):  0 (min):  2      Labor Events    Labor Type:  Spontaneous, Augmentation     Rupture date/time: 10/25/19 2235   Rupture type:  Spontaneous rupture of membranes occuring during spontaneous labor or augmentation  Fluid color:  Clear  Fluid odor:  Normal     Augmentation:  Oxytocin  Indications for augmentation:  Ineffective Contraction Pattern  1:1 continuous labor support provided by?:  RN       Delivery/Placenta Date and Time    Delivery Date:  10/26/19 Delivery Time:   3:18 AM   Placenta Date/Time:  10/26/2019  3:20 AM  Oxytocin given at the time of  delivery:  after delivery of baby     Apgars    Living status:  Living   1 Minute 5 Minute 10 Minute 15 Minute 20 Minute   Skin color: 0  1       Heart rate: 2  2       Reflex irritability: 2  2       Muscle tone: 1  1       Respiratory effort: 2  2       Total: 7  8       Apgars assigned by:  HERNANDEZ DICKSON     Cord    Vessels:  3 Vessels Complications:  None   Cord Blood Disposition:  Lab Gases Sent?:  Yes      [place] Resuscitation    Methods:  Suctioning, Oxygen, NCPAP, Oximetry, Jn Puff        Care at Delivery:  Called by Dr. Ema Kim to attend this unscheduled  at term for failure to progress and maternal HIV positive mother. Her pregnancy was also complicated by polyhydramnios and a marginal umbilical cord insertion.  She has been ruptured for about 4 hours. She is group B strep unknown.  She has received multiple doses of penicilllin while in labor.  She was diagnosed in 2016 with HIV and has been treated with antivirals.  Her viral load has been undetectable for the last three years. She is followed by an ID provider now here in [place].  She had been in [place] and treated there prior to 20 weeks gestation. Infant was delivered with decreased tone and poor respiratory effort.  She was dried and stimulated while she remained on the mother's abdomen.  After 1 minute of delayed cord clamping she was brought to the pre warmed radiant warmer.   She was dried and stimulated.  She was bulb suctioned for a large amount of thicker white secretions.  She remained very dusky overall and initially then given blow-by oxygen at 30%.  This was increased to 50% as when a pulse oximeter was placed a15 seconds later her saturations were in the 60's%.  She was then given mask CPAP using the [place] with a PEEP of 5-6 with oxugen initially of 50-60%.  She continued to require some intermittent bulb suctioning.  She was given CPAP for a total of ~2 minutes and then her saturations wer >90%.  She was  "weaned to room air and her oxygen concentration was decreased over the next 2 minutes to room air which she tolerated well.  Breath sounds were clearing bilaterally with good aeration.  She had mild intercostal retractions.  An 8 Serbian suction catheter was placed orally and then down her left nares and a large amount of thick, white secretions were obtained from her oropharynx and her stomach.  The catheter passed easily.  NASRA Crain, [facility] 10/26/2019 3:44 AM       Lott Measurements    Weight:  6 lb 3.1 oz Length:  1' 8\"   Head circumference:  32.4 cm       Labor Events and Shoulder Dystocia    Fetal Tracing Prior to Delivery:  Category 2  Fetal Tracing Comments:  intermittent variables and late decelerations  Shoulder dystocia present?:  Neg             Delivery (Maternal) (Provider to Complete) ([id number])    Episiotomy:  None  Perineal lacerations:  None       Blood Loss  Mother: Priscila Betancourt #[id number]   Start of Mother's Information    IO Blood Loss  10/26/19 0312 - 10/26/19 0359    Total Surgical QBL Blood Loss (mL) Hospital Encounter 422 mL    Total  422 mL         End of Mother's Information  Mother: Priscila Betancourt #[id number]         Delivery - Provider to Complete ([id number])    Delivering clinician:  Ema Ricardo DO  Delivery Type (Choose the 1 that will go to the Birth History):  , Low Transverse   Specifics:  Primary nulliparius  Indications for Primary:  Arrest of Dilatation          Placenta    Delayed Cord Clamping:  Done  Date/Time:  10/26/2019  3:20 AM  Removal:  Spontaneous  Disposition:  Pathology     Anesthesia    Method:  Epidural          Presentation and Position    Presentation:  Vertex  Position:  Left Occiput Posterior           Ema Ricardo DO  "

## 2019-10-27 LAB — HGB BLD-MCNC: 10.6 G/DL (ref 11.7–15.7)

## 2019-10-27 PROCEDURE — 36415 COLL VENOUS BLD VENIPUNCTURE: CPT | Performed by: FAMILY MEDICINE

## 2019-10-27 PROCEDURE — 25000132 ZZH RX MED GY IP 250 OP 250 PS 637: Performed by: OBSTETRICS & GYNECOLOGY

## 2019-10-27 PROCEDURE — 25000128 H RX IP 250 OP 636: Performed by: FAMILY MEDICINE

## 2019-10-27 PROCEDURE — 12000000 ZZH R&B MED SURG/OB

## 2019-10-27 PROCEDURE — 85018 HEMOGLOBIN: CPT | Performed by: FAMILY MEDICINE

## 2019-10-27 PROCEDURE — 25000132 ZZH RX MED GY IP 250 OP 250 PS 637: Performed by: FAMILY MEDICINE

## 2019-10-27 RX ADMIN — SENNOSIDES AND DOCUSATE SODIUM 1 TABLET: 8.6; 5 TABLET ORAL at 08:58

## 2019-10-27 RX ADMIN — ACETAMINOPHEN 975 MG: 325 TABLET, FILM COATED ORAL at 13:15

## 2019-10-27 RX ADMIN — DOLUTEGRAVIR SODIUM 50 MG: 50 TABLET, FILM COATED ORAL at 19:39

## 2019-10-27 RX ADMIN — IBUPROFEN 800 MG: 800 TABLET ORAL at 19:39

## 2019-10-27 RX ADMIN — KETOROLAC TROMETHAMINE 30 MG: 30 INJECTION, SOLUTION INTRAMUSCULAR at 00:13

## 2019-10-27 RX ADMIN — SENNOSIDES AND DOCUSATE SODIUM 2 TABLET: 8.6; 5 TABLET ORAL at 22:28

## 2019-10-27 RX ADMIN — ACETAMINOPHEN 975 MG: 325 TABLET, FILM COATED ORAL at 22:29

## 2019-10-27 RX ADMIN — ABACAVIR SULFATE AND LAMIVUDINE 1 TABLET: 600; 300 TABLET, FILM COATED ORAL at 19:38

## 2019-10-27 RX ADMIN — IBUPROFEN 800 MG: 800 TABLET ORAL at 06:23

## 2019-10-27 RX ADMIN — IBUPROFEN 800 MG: 800 TABLET ORAL at 13:38

## 2019-10-27 RX ADMIN — OXYCODONE HYDROCHLORIDE 5 MG: 5 TABLET ORAL at 10:34

## 2019-10-27 RX ADMIN — ACETAMINOPHEN 975 MG: 325 TABLET, FILM COATED ORAL at 06:23

## 2019-10-27 NOTE — ANESTHESIA POSTPROCEDURE EVALUATION
Patient: Priscila Betancourt    Procedure(s):   SECTION    Diagnosis:* No pre-op diagnosis entered *  Diagnosis Additional Information: FTP with non rassuring FHT  Dr. Ricardo    Anesthesia Type:  Epidural    Note:  Anesthesia Post Evaluation    Patient location during evaluation: OB PACU  Patient participation: Able to participate in evaluation but full recovery from regional anesthesia has not yet ocurrred but is anticipated to occur within 48 hours  Level of consciousness: awake  Pain management: adequate  multimodal analgesia used between 6 hours prior to anesthesia start to PACU dischargeAirway patency: patent  Cardiovascular status: acceptable  Respiratory status: acceptable  Hydration status: acceptable  PONV: none             Last vitals:  Vitals:    10/26/19 1131 10/26/19 1401 10/26/19 1421   BP: (!) 83/51 (!) 83/59 110/55   Pulse:      Resp: 18 18    Temp: 98.5  F (36.9  C) 97.8  F (36.6  C)    SpO2: 99% 100%          Electronically Signed By: Felipe Fall MD  2019  8:19 PM

## 2019-10-27 NOTE — PLAN OF CARE
Pain controlled with tylenol and toradol. Patient up adlib and voiding without difficulty. Patient's mother in room and supportive. Bonding well with infant.

## 2019-10-27 NOTE — PROGRESS NOTES
Postpartum progress note  10/27/2019     S: Got a little bit of sleep overnight. Pain is achy and sore, controleld with po meds. Lochia minimal. Ambulating without difficulty. Voiding without difficulty. Passing flatus, no BM yet.  Tolerating po intake.     PHYSICAL EXAM:   Vitals:    10/26/19 1421 10/26/19 1630 10/27/19 0015 10/27/19 0904   BP: 110/55 102/53 91/51 101/59   BP Location:  Right arm     Pulse:   74    Resp:  16 16 18   Temp:  97.8  F (36.6  C) 98.1  F (36.7  C) 97.8  F (36.6  C)   TempSrc:  Oral Oral Oral   SpO2:  100%     Weight:       Height:           General: sitting up, alert and cooperative  Abd: soft, non-distended, non-tender. Fundus firm, nontender, 2 cm below umbilicus.   Incision clean/dry/intact with pressure dressing in place.  Extremities: calves nontender, 1+ edema of lower extremities bilaterally    Vitals:    10/25/19 0635   Weight: 93.4 kg (206 lb)       Lab Results   Component Value Date    HGB 10.6 10/27/2019    HGB 12.9 10/25/2019     Blood type:   Lab Results   Component Value Date    RH Pos 10/25/2019         ASSESSMENT: 24 year old  POD 1 s/p PLTCS for arrest of dilation and cat 2 tracing.     PLAN:  - HIV pos: on ARV, viral load undetectable, s/p IV zidovudine while in labor  - Heme: 12.9 > 10.6, no s/s ABLA  - Feeding: bottle/formula  - Birth control: not discussed today   - Rh status: pos, Rhogam not indicated   - Rubella status: non immune, MMR order prior to discharge  - Dispo: home POD2-3 when meeting post op goals    Elizabeth Qureshi MD, MPH  [facility] OB/Gyn

## 2019-10-27 NOTE — PLAN OF CARE
Vitals stable.barrios dc'd and awaiting first void. Up amb in room and lafle[place]. Family visiting and supportive. mom staying in room. FOB not involved. SW consent pending, pt to be in shelter on discharge. Baby needs medication that is biohazard and needs assistance determining safe med handling plan. Health status confidential from family and friends per pt request.

## 2019-10-27 NOTE — PLAN OF CARE
Meeting goals for shift, see flow sheet. Patient with her mother in room caring for infant. Using IBU, tylenol,with relief,  oxycodone for comfort, abdominal binder at bedside. Fluids and ambulation encouraged. Anticipate discharge to home POD 3.

## 2019-10-28 PROCEDURE — 25000132 ZZH RX MED GY IP 250 OP 250 PS 637: Performed by: FAMILY MEDICINE

## 2019-10-28 PROCEDURE — 25000132 ZZH RX MED GY IP 250 OP 250 PS 637: Performed by: OBSTETRICS & GYNECOLOGY

## 2019-10-28 PROCEDURE — 12000000 ZZH R&B MED SURG/OB

## 2019-10-28 RX ADMIN — IBUPROFEN 800 MG: 800 TABLET ORAL at 22:49

## 2019-10-28 RX ADMIN — SENNOSIDES AND DOCUSATE SODIUM 2 TABLET: 8.6; 5 TABLET ORAL at 08:50

## 2019-10-28 RX ADMIN — IBUPROFEN 800 MG: 800 TABLET ORAL at 01:38

## 2019-10-28 RX ADMIN — IBUPROFEN 800 MG: 800 TABLET ORAL at 08:50

## 2019-10-28 RX ADMIN — OXYCODONE HYDROCHLORIDE 5 MG: 5 TABLET ORAL at 01:59

## 2019-10-28 RX ADMIN — ACETAMINOPHEN 975 MG: 325 TABLET, FILM COATED ORAL at 22:49

## 2019-10-28 RX ADMIN — ACETAMINOPHEN 975 MG: 325 TABLET, FILM COATED ORAL at 14:34

## 2019-10-28 RX ADMIN — ABACAVIR SULFATE AND LAMIVUDINE 1 TABLET: 600; 300 TABLET, FILM COATED ORAL at 18:33

## 2019-10-28 RX ADMIN — ACETAMINOPHEN 975 MG: 325 TABLET, FILM COATED ORAL at 06:13

## 2019-10-28 RX ADMIN — OXYCODONE HYDROCHLORIDE 5 MG: 5 TABLET ORAL at 11:36

## 2019-10-28 RX ADMIN — DOLUTEGRAVIR SODIUM 50 MG: 50 TABLET, FILM COATED ORAL at 18:33

## 2019-10-28 RX ADMIN — OXYCODONE HYDROCHLORIDE 5 MG: 5 TABLET ORAL at 17:06

## 2019-10-28 RX ADMIN — IBUPROFEN 800 MG: 800 TABLET ORAL at 17:12

## 2019-10-28 NOTE — PROGRESS NOTES
[facility] Obstetrics Post-Op / Progress Note         Assessment and Plan:    Assessment:   Post-operative day #2  24 year old  POD 2 s/p PLTCS for arrest of dilation and cat 2 tracing.    L&D complications: HIV pos: on ARV, viral load undetectable, s/p IV zidovudine while in labor      Doing well.  Clean wound without signs of infection.  Normal healing wound.  No immediate surgical complications identified.  No excessive bleeding  Pain well-controlled.      Plan:   Ambulation encouraged  Pain control measures as needed  Reportable signs and symptoms dicussed with the patient  MMR prior to d/c  Anticipate discharge tomorrow           Interval History:   Doing well.  Pain is well-controlled.  No fevers.  No history of wound drainage, warmth or significant erythema.  Good appetite.  Denies chest pain, shortness of breath, nausea or vomiting.  Ambulatory.  Bottlefeeding well.          Significant Problems:      Past Medical History:   Diagnosis Date     Asthma     Hosp as child, no intubations     Asymptomatic human immunodeficiency virus (HIV) infection status (H) 2019             Review of Systems:    The patient denies any chest pain, shortness of breath, excessive pain, fever, chills, purulent drainage from the wound, nausea or vomiting.          Medications:     All medications related to the patient's surgery have been reviewed  Current Facility-Administered Medications   Medication     abacavir-lamiVUDine (EPZICOM) 600-300 MG per tablet 1 tablet     [START ON 10/29/2019] acetaminophen (TYLENOL) tablet 650 mg     acetaminophen (TYLENOL) tablet 975 mg     albuterol (PROAIR HFA/PROVENTIL HFA/VENTOLIN HFA) 108 (90 Base) MCG/ACT inhaler 2 puff     albuterol (PROVENTIL) neb solution 2.5 mg     bisacodyl (DULCOLAX) Suppository 10 mg     dextrose 5% in lactated ringers infusion     dolutegravir (TIVICAY) tablet 50 mg     fentaNYL (PF) (SUBLIMAZE) injection 100 mcg     hydrocortisone 2.5 % cream      HYDROmorphone (PF) (DILAUDID) injection 0.3-0.5 mg     ibuprofen (ADVIL/MOTRIN) tablet 800 mg     lactated ringers BOLUS 1,000 mL     lanolin ointment     lidocaine (LMX4) cream     lidocaine 1 % 0.1-1 mL     Measles, Mumps & Rubella Vac (MMR) injection 0.5 mL     misoprostol (CYTOTEC) tablet 800 mcg     morphine (PF) (ASTRAMORPH / DURAMORPH) injection 2.5 mg     naloxone (NARCAN) injection 0.1-0.4 mg     NO Rho (D) immune globulin (RhoGam) needed - mother Rh POSITIVE     ondansetron (ZOFRAN) injection 4 mg     oxyCODONE (ROXICODONE) tablet 5-10 mg     oxytocin (PITOCIN) 30 units in 500 mL 0.9% NaCl infusion     oxytocin (PITOCIN) 30 units in 500 mL 0.9% NaCl infusion     oxytocin (PITOCIN) injection 10 Units     senna-docusate (SENOKOT-S/PERICOLACE) 8.6-50 MG per tablet 1 tablet    Or     senna-docusate (SENOKOT-S/PERICOLACE) 8.6-50 MG per tablet 2 tablet     simethicone (MYLICON) chewable tablet 80 mg     sodium chloride (PF) 0.9% PF flush 3 mL     sodium phosphate (FLEET ENEMA) 1 enema     tranexamic acid (CYKLOKAPRON) infusion 1 g             Physical Exam:   Vitals were reviewed  All vitals stable  Temp: 98.5  F (36.9  C) Temp src: Oral BP: (!) 84/43 Pulse: 90 Heart Rate: 91 Resp: 16        Wound clean and dry with minimal or no drainage.  Surrounding skin with minimal erythema.          Data:     All laboratory data related to this surgery reviewed  Hemoglobin   Date Value Ref Range Status   10/27/2019 10.6 (L) 11.7 - 15.7 g/dL Final   10/25/2019 12.9 11.7 - 15.7 g/dL Final   09/03/2019 10.0 (L) 11.7 - 15.7 g/dL Final   08/13/2019 9.5 (L) 11.7 - 15.7 g/dL Final   06/19/2019 10.2 (L) 11.7 - 15.7 g/dL Final     No imaging studies have been ordered    Wyatt Rivera MD

## 2019-10-28 NOTE — CONSULTS
Care Transition Initial Assessment - SW     Met with: Patient  Active Problems:    Encounter for triage in pregnant patient    Indication for care in labor or delivery    Indication for care in labor and delivery, antepartum    S/P  section       DATA  Lives With: other (see comments)(aunt) - discharging to [facility]  Identified issues/concerns regarding health management: SW consult acknowledged. Pt is a 24 year old female that gave birth to a baby girl. Patient is HIV+ and therefore will be discharging with medication to be given to  for 6 weeks 2x/day via oral syringes, disposed in biohazard container and use of PPE while administering medication. Pt is aware of this. Pt would like further education with the medication and support via administering and disposing correctly. Pharmacy liaison aware of this and a consult will be placed. SW provided resource packet and discussed the discharge plan. Pt's mother will transport pt to [facility] and social workers from the [facility] will meet pt at [facility].     ASSESSMENT  Cognitive Status:  Alert & oriented  Concerns to be addressed: medication education/ support and discharge planning.      PLAN  Financial costs for the patient includes TBD  Patient given options and choices for discharge YES  Patient/family is agreeable to the plan?  YES  Transportation pt's mother will transport pt to [facility] around 1100 or 1200 on 10/29.   Patient Goals and Preferences: Discharge to [facility].   Patient anticipates discharging to:  Discharge to [facility].     LILLIAM Gaines

## 2019-10-28 NOTE — PLAN OF CARE
Pt up ad cecilio and bottle feeding baby. Ibu/tyl/oxy for pain and incision covered with steri strips and tegaderm. Plan to discharge to shelter tomorrow and pharmacist will come in the am and explain to mother how to give the med to baby at shelter. Will monitor.

## 2019-10-28 NOTE — PLAN OF CARE
VSS. Voiding without difficulty. Up ad cecilio. Pain managed with tylenol, ibuprofen and oxycodone. Bonding appropriately with infant.

## 2019-10-28 NOTE — PLAN OF CARE
Vitals stable. Up independently caring for self and baby. Incision clean dry and intact. Took shower. Visitors present and supportive. Good appetite. Fundus firm.

## 2019-10-29 VITALS
OXYGEN SATURATION: 100 % | HEART RATE: 100 BPM | SYSTOLIC BLOOD PRESSURE: 121 MMHG | RESPIRATION RATE: 16 BRPM | WEIGHT: 206 LBS | DIASTOLIC BLOOD PRESSURE: 65 MMHG | BODY MASS INDEX: 32.33 KG/M2 | HEIGHT: 67 IN | TEMPERATURE: 98.2 F

## 2019-10-29 LAB
BACTERIA SPEC CULT: ABNORMAL
COPATH REPORT: NORMAL
SPECIMEN SOURCE: ABNORMAL

## 2019-10-29 PROCEDURE — 25000132 ZZH RX MED GY IP 250 OP 250 PS 637: Performed by: FAMILY MEDICINE

## 2019-10-29 RX ORDER — HYDROCODONE BITARTRATE AND ACETAMINOPHEN 5; 325 MG/1; MG/1
1 TABLET ORAL EVERY 6 HOURS PRN
Qty: 18 TABLET | Refills: 0 | Status: SHIPPED | OUTPATIENT
Start: 2019-10-29 | End: 2019-11-01

## 2019-10-29 RX ORDER — ABACAVIR AND LAMIVUDINE 600; 300 MG/1; MG/1
1 TABLET, FILM COATED ORAL DAILY
Qty: 30 TABLET | Refills: 3 | Status: SHIPPED | OUTPATIENT
Start: 2019-10-29 | End: 2020-01-07

## 2019-10-29 RX ORDER — AMOXICILLIN 250 MG
2 CAPSULE ORAL 2 TIMES DAILY PRN
Qty: 30 TABLET | Refills: 1 | Status: SHIPPED | OUTPATIENT
Start: 2019-10-29 | End: 2021-01-13

## 2019-10-29 RX ORDER — IBUPROFEN 800 MG/1
800 TABLET, FILM COATED ORAL EVERY 6 HOURS PRN
Qty: 30 TABLET | Refills: 1 | Status: SHIPPED | OUTPATIENT
Start: 2019-10-29 | End: 2021-01-13

## 2019-10-29 RX ADMIN — OXYCODONE HYDROCHLORIDE 5 MG: 5 TABLET ORAL at 01:22

## 2019-10-29 RX ADMIN — IBUPROFEN 800 MG: 800 TABLET ORAL at 06:33

## 2019-10-29 RX ADMIN — ACETAMINOPHEN 650 MG: 325 TABLET, FILM COATED ORAL at 06:33

## 2019-10-29 NOTE — PLAN OF CARE
Data: Vital signs within normal limits. Postpartum checks within normal limits - see flow record. Patient eating and drinking normally. Patient able to empty bladder independently and is up ambulating. No apparent signs of infection. Patient performing self cares and is able to care for infant.  Action: Declined pain interventions. Stated they are comfortable. Patient education completed. See flow record.  Response: Positive attachment behaviors observed with infant.   Plan: Discharged to home today at 11:54 with all patient belongings. AVS read to patient. All questions and concerns addressed. Patient declined MMR vaccine. Plans to get in clinic after discharge.

## 2019-10-29 NOTE — DISCHARGE SUMMARY
DELIVERY DISCHARGE SUMMARY    Admit date: 10/25/2019  Discharge date: 10/29/2019     Admit Dx:   - 24 year old y/o  at 39w5d   - HIV positive  - rubella non immune    Discharge Dx:  - Same as above, s/p primary low transverse  section    Procedures:  - primary low transverse  section   - seprafilm placement for adhesiolysis    Admit HPI:  Priscila Betancourt is a 24 year old female who is  @ 39w4d pregnant and being admitted for active labor management and polyhydramnios, HIV +, scant prenatal care, bpp today . Last HIV quantitative result 9/3 was undetectable.  Has has undetectable levels x 3 years.  Discussed since we do not have recent labs on her viral loads, a  CS could be considered. She declines this. She has been offered IV zidovudine per [facility] and would like to have that.      Please see her admit H&P for full details of her PMH, PSH, Meds, Allergies and exam on admit.    Hospital course:    Indications for :  A 24 year old-year-old  at 39w5d weeks estimated gestational age admitted for labor with polyhydramnios, HIV positive, arrest of dilation, category 2 tracing.  Patient signed consent for primary  section, risks benefits, alternatives are discussed with the patient.    Please see her  Section Operative Note for full details regarding her delivery.    Her postoperative course was complicated by nothing. On POD#3, she was meeting all of her postpartum goals and deemed stable for discharge. She was voiding without difficulty, tolerating a regular diet without nausea and vomiting, her pain was well controlled on oral pain medicines and her lochia was appropriate. Her hemoglobin after delivery was 10,6. Her Rh status was pos and Rhogam was not indicated. At the time of discharge, she was bottle feeding her infant and considering IUD for contraception.     Physical exam on the day of discharge:  Vitals:    10/27/19 2345 10/28/19 0849 10/28/19 1712  10/29/19 0035   BP: 107/63 (!) 84/43 108/58 98/49   BP Location:  Right arm     Pulse:  90 87    Resp: 16  16 16   Temp: 98.6  F (37  C) 98.5  F (36.9  C) 98.1  F (36.7  C) 98.5  F (36.9  C)   TempSrc: Oral Oral Oral Oral   SpO2:       Weight:       Height:           General: sitting up, alert and cooperative  Abd: soft, non-distended, non-tender. Fundus firm, nontender, 2 cm below umbilicus.   Incision clean/dry/intact   Extremities: calves nontender, trace edema of lower extremities bilaterally    Lab Results   Component Value Date    HGB 10.6 10/27/2019    HGB 12.9 10/25/2019     Blood type:   Lab Results   Component Value Date    RH Pos 10/25/2019       Discharge/Disposition:  Priscila Bteancourt was discharged to home in stable condition with the following instructions/medications:  1) Call for temperature > 100.4, foul smelling vaginal discharge, bleeding > 1 pad per hour x 2 hrs, pain not controlled by oral pain meds, severe constipation or severe nausea or vomiting.  2) She received contraceptive counseling.  3) She was instructed to follow-up with her primary OB in 6 weeks for a routine postpartum visit and in 2 weeks for an incision check with Dr Ricardo.  4) She was instructed to continue her PNV on discharge if she wished to breast feed her infant.  5) She was discharged home with the following medications: ibuprofen, HIV meds refilled for 1 month, norco, stool softener.     Elizabeth Qureshi MD, MPH  [facility]

## 2019-10-29 NOTE — PROGRESS NOTES
PHN met with patient today, discussed [facility] Nurse home visiting services, Patient accepted referral for PHN home visiting, Tonia warning given, referral made electronically  for PHN home visiting,  discussed WIC and how to add baby to insurance.Patient denies any other question or concerns at this time.

## 2019-10-29 NOTE — PLAN OF CARE
Patient vital signs stable and meeting expected outcomes.  Bottle feeding independently.  Up independently and voiding adequately.  Pain well controlled with tylenol, ibuprofen, and oxycodone.  Incision WDL.  Able to perform all cares for self and infant.  Bonding well with baby.  Plan to discharge today.  Will continue to monitor.

## 2019-10-29 NOTE — PHARMACY - DISCHARGE MEDICATION RECONCILIATION AND EDUCATION
Discharge medication review for this patient is complete. Face-to-face medication education was provided by the pharmacist.  See [facility] for allergy information and immunization status.   Pharmacist assisted with medication reconciliation of discharge medications with PTA medications.    Patient was educated on the following for each discharge medication:   - Pathophysiology of disease requiring treatment  - Correct dose and duration of treatment  - Compliance  - The importance of follow-up monitoring  - The potential for drug and/or diet interactions  - Adverse Effects  - Storage      Left written materials and instructions (Clinical notes from [facility]) for new medications: No    OUTCOMES: Patient verbalized understanding    IMPORTANT FOLLOW UP NOTES:   - Educated patient on her medications as well as how to administer her baby's Zidovudine soln. Baby's zidovudine dose verified and appropriate. Informed pt, baby's dose will likely change as it is weight based and she should follow-up with provider on new dosing.     Time spent: 15 mins    Discharge Medication List     Review of your medicines      START taking      Dose / Directions   HYDROcodone-acetaminophen 5-325 MG tablet  Commonly known as:  NORCO      Dose:  1 tablet  Take 1 tablet by mouth every 6 hours as needed for pain  Quantity:  18 tablet  Refills:  0     ibuprofen 800 MG tablet  Commonly known as:  ADVIL/MOTRIN      Dose:  800 mg  Take 1 tablet (800 mg) by mouth every 6 hours as needed for other (cramping)  Quantity:  30 tablet  Refills:  1     senna-docusate 8.6-50 MG tablet  Commonly known as:  SENOKOT-S/PERICOLACE      Dose:  2 tablet  Take 2 tablets by mouth 2 times daily as needed for constipation  Quantity:  30 tablet  Refills:  1        CONTINUE these medicines which have NOT CHANGED      Dose / Directions   abacavir-lamiVUDine 600-300 MG tablet  Commonly known as:  EPZICOM  Used for:  Human immunodeficiency virus (HIV) disease (H)      Dose:  1  tablet  Take 1 tablet by mouth daily  Quantity:  30 tablet  Refills:  3     * albuterol (2.5 MG/3ML) 0.083% neb solution  Commonly known as:  PROVENTIL  Used for:  Moderate persistent asthma      Dose:  1 ampule  Take 3 mLs by nebulization every 6 hours as needed for shortness of breath / dyspnea.  Quantity:  24 mL  Refills:  3     * albuterol 108 (90 Base) MCG/ACT inhaler  Commonly known as:  PROAIR HFA/PROVENTIL HFA/VENTOLIN HFA  Used for:  Chronic obstructive airway disease with asthma (H)      Dose:  2 puff  Inhale 2 puffs into the lungs 4 times daily as needed for shortness of breath / dyspnea  Quantity:  2 Inhaler  Refills:  4     dolutegravir 50 MG tablet  Commonly known as:  TIVICAY  Used for:  Human immunodeficiency virus (HIV) disease (H)      Dose:  50 mg  Take 1 tablet (50 mg) by mouth daily  Quantity:  30 tablet  Refills:  3     ferrous sulfate 325 (65 Fe) MG tablet  Commonly known as:  FEROSUL  Used for:  High-risk pregnancy, unspecified trimester, Anemia during pregnancy      Dose:  325 mg  Take 1 tablet (325 mg) by mouth daily (with breakfast)  Quantity:  90 tablet  Refills:  3     fluticasone-salmeterol 250-50 MCG/DOSE inhaler  Commonly known as:  ADVAIR DISKUS  Used for:  Chronic obstructive airway disease with asthma (H)      Dose:  1 puff  Inhale 1 puff into the lungs 2 times daily  Quantity:  1 Inhaler  Refills:  5     folic acid 400 MCG tablet  Commonly known as:  FOLVITE  Used for:  Pregnant state, incidental      Dose:  400 mcg  Take 1 tablet (400 mcg) by mouth daily  Quantity:  100 tablet  Refills:  3     prenatal multivitamin w/iron 27-0.8 MG tablet  Used for:  High-risk pregnancy, unspecified trimester, Anemia during pregnancy      Dose:  1 tablet  Take 1 tablet by mouth daily  Quantity:  100 tablet  Refills:  3         * This list has 2 medication(s) that are the same as other medications prescribed for you. Read the directions carefully, and ask your doctor or other care provider to  review them with you.               Where to get your medicines      These medications were sent to [facility] [place], [place] - [address] [facility]  [address] [address] [address]    Phone:  [phone]     abacavir-lamiVUDine 600-300 MG tablet    dolutegravir 50 MG tablet    HYDROcodone-acetaminophen 5-325 MG tablet    ibuprofen 800 MG tablet    senna-docusate 8.6-50 MG tablet

## 2019-11-01 LAB
6MAM SERPL-MCNC: NEGATIVE NG/ML
AMPHETAMINES UR QL SCN: NEGATIVE
CANNABINOIDS UR QL: NEGATIVE
COCAINE UR QL: NEGATIVE
CODEINE UR CFM-MCNC: NEGATIVE NG/ML
MORPHINE UR CFM-MCNC: 831 NG/ML
OPIATES UR QL SCN: ABNORMAL
PCP UR QL SCN: NEGATIVE

## 2019-11-11 ENCOUNTER — OFFICE VISIT (OUTPATIENT)
Dept: OBGYN | Facility: CLINIC | Age: 24
End: 2019-11-11
Payer: COMMERCIAL

## 2019-11-11 VITALS — DIASTOLIC BLOOD PRESSURE: 72 MMHG | BODY MASS INDEX: 29.76 KG/M2 | SYSTOLIC BLOOD PRESSURE: 112 MMHG | WEIGHT: 190 LBS

## 2019-11-11 DIAGNOSIS — O40.3XX0 POLYHYDRAMNIOS AFFECTING PREGNANCY IN THIRD TRIMESTER: ICD-10-CM

## 2019-11-11 DIAGNOSIS — O43.199 MARGINAL INSERTION OF UMBILICAL CORD AFFECTING MANAGEMENT OF MOTHER: ICD-10-CM

## 2019-11-11 DIAGNOSIS — Z98.891 S/P CESAREAN SECTION: Primary | ICD-10-CM

## 2019-11-11 PROBLEM — O09.899 HIV (HUMAN IMMUNODEFICIENCY VIRUS) RISK FACTORS COMPLICATING PREGNANCY: Status: RESOLVED | Noted: 2019-06-17 | Resolved: 2019-11-11

## 2019-11-11 PROCEDURE — 99024 POSTOP FOLLOW-UP VISIT: CPT | Performed by: FAMILY MEDICINE

## 2019-11-11 NOTE — NURSING NOTE
"Chief Complaint   Patient presents with     Surgical Followup     Incision check after  on 10/26/19.        Initial /72   Wt 86.2 kg (190 lb)   LMP 2019 (Exact Date)   Breastfeeding? No   BMI 29.76 kg/m   Estimated body mass index is 29.76 kg/m  as calculated from the following:    Height as of 10/25/19: 1.702 m (5' 7\").    Weight as of this encounter: 86.2 kg (190 lb).  BP completed using cuff size: regular    Questioned patient about current smoking habits.  Pt. has never smoked.          The following HM Due: NONE      The following patient reported/Care Every where data was sent to:  P ABSTRACT QUALITY INITIATIVES [[id number]]  Annamaria Gordillo LPN               "

## 2019-11-11 NOTE — PROGRESS NOTES
Subjective: 24 year old female   status post 1LTCS on 10/26/19, here for incision check.  Doing well, denies fever, significant pain.    Is not taking pain medications.   States some light vaginal bleeding.  HIV +  Objective:  EXAM:  /72   Wt 86.2 kg (190 lb)   LMP 01/21/2019 (Exact Date)   Breastfeeding? No   BMI 29.76 kg/m    Constitutional: healthy, alert and no distress  Gastrointestinal: Abdomen soft, non-tender. Incision intact, no erthema, drainage.      Assessment/Plan: 24 year old female   status post 1LTCS on 10/26/19, here for incision check.  Doing well, denies fever, significant pain.    V67.00C Surgery Follow-Up Examination  (primary encounter diagnosis)  Comment:    Plan: doing well   Return for post partum exam in 4 weeks   nexplanon for birth control   Recommend to follow up with infectious disease     Dr. Ema Ricardo, DO    Obstetrics and Gynecology  [facility] and [place]

## 2019-11-11 NOTE — PATIENT INSTRUCTIONS
Return for post partum exam (4 weeks from now)     Dr. Ema Ricardo,     Obstetrics and Gynecology  [facility] [place] and [place]

## 2019-11-14 ENCOUNTER — TELEPHONE (OUTPATIENT)
Dept: FAMILY MEDICINE | Facility: CLINIC | Age: 24
End: 2019-11-14

## 2019-11-14 NOTE — TELEPHONE ENCOUNTER
Call to patient to set up appointment for ACT/asthma. Patient agrees to appointment for Jan. 7, 4 pm.   Joseline Jones, YAAN, RN, PHN

## 2019-11-20 ENCOUNTER — TELEPHONE (OUTPATIENT)
Dept: FAMILY MEDICINE | Facility: CLINIC | Age: 24
End: 2019-11-20

## 2019-11-20 NOTE — TELEPHONE ENCOUNTER
Patient Quality Outreach      Patient has the following on her problem list: None    Composite cancer screening  Chart review shows that this patient is due/due soon for the following Pap Smear  Patient declined cancer screening. No  Summary:    Patient is due/failing the following:   Cervical Cancer Screening - PAP Needed    Type of outreach:    Pap found in care everwhere 4/25/19    Questions for provider review:    None                                                                                                                                    Daisha Osorio       Chart routed to abstracting .

## 2019-11-28 ENCOUNTER — NURSE TRIAGE (OUTPATIENT)
Dept: NURSING | Facility: CLINIC | Age: 24
End: 2019-11-28

## 2019-11-29 NOTE — TELEPHONE ENCOUNTER
Pt reports hives (swollen, red, itchy bumps) tonight on legs, abdomen and wrists. No breathing or swallowing difficulty. No swelling of face, tongue, throat or lips. Took a hot shower which made itching worse. States has never had hives before. Taking dolutegravir and abacavir-lamivudine (HIV+) as well as albuterol and Advair.Diskus. None of these meds are new within past 2 wks. Denies any food allergies. Only known allergy is to pollen. Did not eat any nuts, peanuts, or seafood tonight. Denies using any new soaps, lotions or laundry detergent. Triaged to home care but advised see or call provider tomorrow - just to be sure provider does not think hives could be related to her anti-viral meds or other concern. Call back if new/worse sx.     Additional Information    Negative: [1] Life-threatening reaction (anaphylaxis) in the past to similar substance (e.g., food, insect bite/sting, chemical, etc.) AND [2] < 2 hours since exposure    Negative: Difficulty breathing or wheezing now    Negative: [1] Swollen tongue AND [2] rapid onset    Negative: [1] Hoarseness or cough now AND [2] rapid onset    Negative: Shock suspected (e.g., cold/pale/clammy skin, too weak to stand, low BP, rapid pulse)    Negative: Difficult to awaken or acting confused (e.g., disoriented, slurred speech)    Negative: Sounds like a life-threatening emergency to the triager    Negative: [1] Bee, wasp, or yellow jacket sting AND [2] within last 24 hours    Negative: Blood-colored, dark red or purple rash    Negative: [1] Drug rash suspected AND [2] started taking new medicine within last 2 weeks(Exception: antihistamine, eye drops, ear drops, decongestant or other OTC cough/cold medicines)    Negative: Doesn't match the SYMPTOMS of hives    Negative: Swollen tongue    Negative: [1] Widespread hives AND [2] onset < 2 hours of exposure to high-risk allergen (e.g., peanuts, tree nuts, fish or shellfish)    Negative: Patient sounds very sick or weak to  the triager    Negative: [1] MODERATE-SEVERE hives persist (i.e., hives interfere with normal activities or work) AND [2] taking antihistamine (e.g., Benadryl, Claritin) > 24 hours    Negative: [1] Hives have become worse AND [2] taking oral steroids (e.g., prednisone) > 24 hours    Negative: Joint swelling    Negative: [1] Abdominal pain AND [2] pain present > 12 hours    Negative: Fever    Negative: [1] Widespread hives, itching or facial swelling AND [2] onset > 2 hours after exposure to high-risk allergen (e.g., sting, nuts, 1st dose of antibiotic)    Negative: [1] Hives from food reaction AND [2] diagnosis never confirmed by a HCP    Negative: Hives persist > 1 week    Negative: [1] Hives has occurred 3 or more times in the last year AND [2] the cause was not found    Negative: [1] Hives from food reaction AND [2] diagnosis already confirmed    Negative: Localized hives    Widespread hives    Protocols used: HIVES-A-

## 2019-12-10 ENCOUNTER — MEDICAL CORRESPONDENCE (OUTPATIENT)
Dept: HEALTH INFORMATION MANAGEMENT | Facility: CLINIC | Age: 24
End: 2019-12-10

## 2019-12-10 ENCOUNTER — PRENATAL OFFICE VISIT (OUTPATIENT)
Dept: OBGYN | Facility: CLINIC | Age: 24
End: 2019-12-10
Payer: COMMERCIAL

## 2019-12-10 VITALS
WEIGHT: 197.2 LBS | DIASTOLIC BLOOD PRESSURE: 64 MMHG | BODY MASS INDEX: 30.95 KG/M2 | HEIGHT: 67 IN | SYSTOLIC BLOOD PRESSURE: 120 MMHG

## 2019-12-10 DIAGNOSIS — L50.9 HIVES: ICD-10-CM

## 2019-12-10 DIAGNOSIS — Z30.017 ENCOUNTER FOR INITIAL PRESCRIPTION OF IMPLANTABLE SUBDERMAL CONTRACEPTIVE: ICD-10-CM

## 2019-12-10 DIAGNOSIS — F43.23 ADJUSTMENT DISORDER WITH MIXED ANXIETY AND DEPRESSED MOOD: ICD-10-CM

## 2019-12-10 LAB
HGB BLD-MCNC: 11.4 G/DL (ref 11.7–15.7)
SPECIMEN SOURCE: NORMAL
WET PREP SPEC: NORMAL

## 2019-12-10 PROCEDURE — 11981 INSERTION DRUG DLVR IMPLANT: CPT | Performed by: FAMILY MEDICINE

## 2019-12-10 PROCEDURE — 85018 HEMOGLOBIN: CPT | Performed by: FAMILY MEDICINE

## 2019-12-10 PROCEDURE — 99207 ZZC POST PARTUM EXAM: CPT | Performed by: FAMILY MEDICINE

## 2019-12-10 PROCEDURE — 87210 SMEAR WET MOUNT SALINE/INK: CPT | Performed by: FAMILY MEDICINE

## 2019-12-10 PROCEDURE — 36415 COLL VENOUS BLD VENIPUNCTURE: CPT | Performed by: FAMILY MEDICINE

## 2019-12-10 ASSESSMENT — ANXIETY QUESTIONNAIRES
6. BECOMING EASILY ANNOYED OR IRRITABLE: MORE THAN HALF THE DAYS
5. BEING SO RESTLESS THAT IT IS HARD TO SIT STILL: MORE THAN HALF THE DAYS
3. WORRYING TOO MUCH ABOUT DIFFERENT THINGS: NEARLY EVERY DAY
1. FEELING NERVOUS, ANXIOUS, OR ON EDGE: NEARLY EVERY DAY
GAD7 TOTAL SCORE: 16
2. NOT BEING ABLE TO STOP OR CONTROL WORRYING: NEARLY EVERY DAY
7. FEELING AFRAID AS IF SOMETHING AWFUL MIGHT HAPPEN: NOT AT ALL

## 2019-12-10 ASSESSMENT — PATIENT HEALTH QUESTIONNAIRE - PHQ9
SUM OF ALL RESPONSES TO PHQ QUESTIONS 1-9: 16
5. POOR APPETITE OR OVEREATING: NEARLY EVERY DAY

## 2019-12-10 ASSESSMENT — MIFFLIN-ST. JEOR: SCORE: 1677.12

## 2019-12-10 NOTE — NURSING NOTE
"Chief Complaint   Patient presents with     Post Partum Exam      10/26/19--pt would like anxiety medication     Contraception     Nexplanon--pt is left handed       Initial /64   Ht 1.702 m (5' 7\")   Wt 89.4 kg (197 lb 3.2 oz)   BMI 30.89 kg/m   Estimated body mass index is 30.89 kg/m  as calculated from the following:    Height as of this encounter: 1.702 m (5' 7\").    Weight as of this encounter: 89.4 kg (197 lb 3.2 oz).  BP completed using cuff size: regular    Questioned patient about current smoking habits.  Pt. has never smoked.          The following HM Due: NONE         "

## 2019-12-10 NOTE — PATIENT INSTRUCTIONS
Return in 4 weeks for med check     Dr. Ema Ricardo, DO    Obstetrics and Gynecology  [facility] [place] and [place]

## 2019-12-10 NOTE — PROGRESS NOTES
SUBJECTIVE: Priscila is here for a 6-week postpartum checkup.    Delivery date was 10/26/19. She had a c/s for maternal complications of a viable girl, weight 6 pounds 3 oz., with no complications.  Since delivery, she has not been breast feeding.  She has no signs of infection, bleeding or other complications.  She is not pregnant.  We discussed contraceptions and she has chosen Nexplanon.  She  has not had intercourse since delivery and complains of No discomfort. Patient screened for postpartum depression and complaints are NEGATIVE. Screening has also been completed for intimate partner violence.    > Patient reports vaginal discharge. Denies odor.     > Patient experienced an episode of hives two weeks ago. Denies new medications.     This document serves as a record of the services and decisions personally performed and made by Ema Ricardo DO. It was created on her behalf by Danielle Mart, a trained medical scribe. The creation of this document is based on the provider's statements to the medical scribe.  Danielle Mart 10:15 AM December 10, 2019    EXAM:  Today's Depression Rating was 16  PHQ-9 SCORE 12/10/2019   PHQ-9 Total Score -   PHQ-9 Total Score MyChart -   PHQ-9 Total Score 16     GENERAL healthy, alert and no distress  EYES EOMI, intact visual fields, PERRL and funduscopic deferred  HENT: Normocephalic. TM's grossly normal, oropharynx without significant findings.  NECK: no adenopathy, no asymmetry, masses, or scars and thyroid normal to palpation  RESP: Clear to auscultation  BREASTS: NEGATIVE  CV: NEGATIVE  LYMPH: NEGATIVE  GI: aorta normal and bowel sounds normal  : no hernia detected  GYN PELVIC: NEGATIVE, normal external genitalia, normal groin lymphatics, normal urethral meatus, normal vaginal mucosa, normal cervix and normal adnexa, no masses or tenderness  MS: NEGATIVE, Extremities normal. No deformaties, edema or skin discoloration.  SKIN: no ulcers, lesions or rash  NEURO:  alert/oriented to person, location and time, CN 2-12 intact, brisk, symmetric reflexes at knees and biceps b/l, strength 5/5 throughout and symmetric, sensation to light touch grossly intact throughout  PSYCH: NEGATIVE      Procedure Note:  Placement of Nexplanon for birth control    The patient is positioned with her left arm flexed at the elbow.  A point ~6 cm from the epicondyle is marked and another point 6 cm caudal to this is marked on the inner arm.  This area is cleansed with betadine and then injected with 1% lidocaine with epi.  The Nexplanon device is opened and it is confirmed that the billie is in the device.  A small stab incision is made at the point closer to the elbow.  The Nexplanon device is then placed just under the skin with care not to go too deep.  The device is then slowly removed, leaving the billie behind.  The billie is palpable in the appropriate place under the skin.  The incision is noted to be hemostatic and the area is wrapped with a 4x4 and tape.      There were no complications and minimal blood loss.    ASSESSMENT:   Normal postpartum exam after  and c/s for maternal complications.    PLAN:  1) Nexplanon placed today   2) Hemoglobin today - pending   3) Vaginal Discharge- Wet prep pending   4) Postpartum depression/anxiety- Rx Prozac   5) Hives- referred to the allergist   6) Return: in four weeks for medication recheck     The information in this document, created by the medical scribe for me, accurately reflects the services I personally performed and the decisions made by me. I have reviewed and approved this document for accuracy prior to leaving the patient care area.    Ema Ricardo DO  December 10, 2019 10:32 AM

## 2019-12-10 NOTE — PROGRESS NOTES
Nexplanon inserted today.  Lot: [id number]  Exp: 3/5/2022  ND: [id number]    Nexplanon needs to be removed or replaced 12/10/2022    2% Xylocaine with Epinephrine used for block  Lot: [id number]  Exp: 04/21  ND: [id number]  Cecilia Winchester CMA

## 2019-12-11 ASSESSMENT — ANXIETY QUESTIONNAIRES: GAD7 TOTAL SCORE: 16

## 2019-12-12 DIAGNOSIS — D64.9 ANEMIA: Primary | ICD-10-CM

## 2020-01-06 DIAGNOSIS — B20 HUMAN IMMUNODEFICIENCY VIRUS (HIV) DISEASE (H): ICD-10-CM

## 2020-01-07 RX ORDER — ABACAVIR AND LAMIVUDINE 600; 300 MG/1; MG/1
TABLET, FILM COATED ORAL
Qty: 90 TABLET | Refills: 0 | Status: SHIPPED | OUTPATIENT
Start: 2020-01-07 | End: 2021-01-13

## 2020-01-07 NOTE — TELEPHONE ENCOUNTER
Future Office visit:    Next 5 appointments (look out 90 days)    Jan 07, 2020  4:20 PM CST  (Arrive by 4:00 PM)  Office Visit with Angel Jon MD, Cr R[facility] [address] [place] [id number] [phone]           Routing refill request to provider for review/approval because:  Drug not on the [facility], [facility] or  Health refill protocol or controlled substance

## 2020-01-09 ENCOUNTER — TELEPHONE (OUTPATIENT)
Dept: FAMILY MEDICINE | Facility: CLINIC | Age: 25
End: 2020-01-09

## 2020-01-09 NOTE — TELEPHONE ENCOUNTER
Fax from [facility], advair needs PA, do not see recent rx sent, must have refills on file from June, no alternates provided, routed to MAGUE HA MD please send alternate      fluticasone-salmeterol (ADVAIR DISKUS) 250-50 MCG/DOSE inhaler 1 Inhaler 5 6/27/2019     Celine Abdul RN, BSN  Message handled by Nurse Triage.

## 2020-01-09 NOTE — TELEPHONE ENCOUNTER
PA or alternate rx needed, see below    Pharmacy Benefit Information:    Provider: ESSENCE  Phone #: [phone]  ID#: [id number]  Medication/SIG: abacavir-lamiVUDine (EPZICOM) 600-300 MG tablet  Pharmacy: [facility]    Routed to ESSENCE, please advise RX CHANGE OR PA, ROUTE BACK FOR PROCESSING  Celine Abdul RN, BSN  Message handled by Nurse Triage.

## 2020-01-10 NOTE — TELEPHONE ENCOUNTER
Central Prior Authorization Team   Phone: [phone]      PA NOT NEEDED    Medication: advair-PA NOT NEEDED  Insurance Company:    Pharmacy Filling the Rx:    Filling Pharmacy Phone:    Filling Pharmacy Fax:    Start Date: 1/10/2020    Called pharmacy to see if they tried running Wixela, the generic of Advair Diskus, they did not.  Pharmacy changed to Wixela and got a paid claim.  The copay is $1.  Pharmacy will notify patient when medication is ready.

## 2020-01-13 NOTE — TELEPHONE ENCOUNTER
PA Initiation    Medication: abacavir/lamvudine  Insurance Company: [facility] - Phone [phone] Fax [phone]  Pharmacy Filling the Rx: [facility] #[id number] [address] AT [place] [address]  Filling Pharmacy Phone: [phone]  Filling Pharmacy Fax:    Start Date: 1/13/2020    [place] Prior Authorization Team   Phone: [phone]        Awaiting additional questions via CMM

## 2020-01-16 NOTE — TELEPHONE ENCOUNTER
Prior Authorization Not Needed per Insurance    Medication: abacavir/lamvudine  Insurance Company: [employer] - Phone [phone] Fax [phone]  Expected CoPay:    n/a  Pharmacy Filling the Rx: "[facility] #[id number] [address] AT [place] [address]  Pharmacy Notified: Yes- this was actually just rejecting refill too soon until 1/30/2020, but they do not see that she picked it up with them.

## 2020-03-18 DIAGNOSIS — Z21 HIV (HUMAN IMMUNODEFICIENCY VIRUS INFECTION) (H): Primary | ICD-10-CM

## 2020-03-26 DIAGNOSIS — J44.89 CHRONIC OBSTRUCTIVE AIRWAY DISEASE WITH ASTHMA (H): ICD-10-CM

## 2020-03-26 NOTE — TELEPHONE ENCOUNTER
3/26/2020    Pt needing a refill on RX Albuterol  Pt contact # [phone]  Pharmacy: [facility] [address] [place] [address]    albuterol (PROAIR HFA/PROVENTIL HFA/VENTOLIN HFA) 108 (90 Base) MCG/ACT inhaler 2 puff, 4 TIMES DAILY PRN 4 ordered  EditCancel Reorder       Dose, Route, Frequency: 2 puff, Inhalation, 4 TIMES DAILY PRN  Start: 6/27/2019  Ord/Sold: 6/27/2019 (O)  Report  Adh:   Taking:   Long-term:   Pharmacy: [id number] [place], [place] - [address] AT [place] [address] [facility]  Med Dose History       Summary: Inhale 2 puffs into the lungs 4 times daily as needed for shortness of breath / dyspnea, Disp-2 Inhaler, R-4, E-Prescribe       Patient Sig: Inhale 2 puffs into the lungs 4 times daily as needed for shortness of breath / dyspnea       Ordered on: 6/27/2019       Authorized by: AMGUE HA       Dispense: 2 Inhaler

## 2020-03-26 NOTE — TELEPHONE ENCOUNTER
Last OV 6/27/19.  Cancelled 1/7/20 appt and no showed 2/11/20 and has now not rescheduled.  Do you want phone visit now?  Sent to provider.  Please advise.  FREDERIC Dietrich Christine, FREDERIC           11/14/19 10:39 AM   Note      Call to patient to set up appointment for ACT/asthma. Patient agrees to appointment for Jan. 7, 4 pm.   Joseline Jones, YAAN, RN, PHN

## 2020-03-27 DIAGNOSIS — B20 HUMAN IMMUNODEFICIENCY VIRUS (HIV) DISEASE (H): ICD-10-CM

## 2020-03-27 RX ORDER — DOLUTEGRAVIR SODIUM 50 MG/1
TABLET, FILM COATED ORAL
Qty: 90 TABLET | OUTPATIENT
Start: 2020-03-27

## 2020-03-27 RX ORDER — ALBUTEROL SULFATE 90 UG/1
2 AEROSOL, METERED RESPIRATORY (INHALATION) 4 TIMES DAILY PRN
Qty: 1 INHALER | Refills: 0 | Status: SHIPPED | OUTPATIENT
Start: 2020-03-27 | End: 2021-05-13

## 2020-03-27 NOTE — TELEPHONE ENCOUNTER
"Routing to provider to advise.  Melissa MONN, RN        Requested Prescriptions   Pending Prescriptions Disp Refills     TIVICAY 50 MG tablet [Pharmacy Med Name: TIVICAY 50MG TABLETS] 90 tablet      Sig: TAKE 1 TABLET BY MOUTH ONCE DAILY       Antiretroviral Agents Failed - 3/27/2020  9:50 AM        Failed - AST less than 55 on file in past 3 mos     Recent Labs   Lab Test 09/03/19  1538   AST 10             Failed - Recent (3 mo) or future (30 days) visit within the authorizing provider's specialty     Patient had office visit in the last 3 months or has a visit in the next 30 days with authorizing provider or within the authorizing provider's specialty.  See \"Patient Info\" tab in inbasket, or \"Choose Columns\" in Meds & Orders section of the refill encounter.                Failed - Refills for this medication group require provider approval        Failed - Serum HIV less than 200 on file in past 3 mos.     Recent Labs   Lab Test 10/25/19  0820   HIQT <20*             Failed - CD4 count greater than 300 on file in past 3 mos.     No lab results found.          Failed - ALT less than 90 on file in past 3 mos.     Recent Labs   Lab Test 09/03/19  1538   ALT 13             Failed - No positive pregnancy test within past 12 months        Passed - Patient is age 6 or older        Passed - Medication is active on med list        Passed - No active pregnancy on record             "

## 2020-04-13 ENCOUNTER — MEDICAL CORRESPONDENCE (OUTPATIENT)
Dept: HEALTH INFORMATION MANAGEMENT | Facility: CLINIC | Age: 25
End: 2020-04-13

## 2020-04-23 ENCOUNTER — VIRTUAL VISIT (OUTPATIENT)
Dept: FAMILY MEDICINE | Facility: CLINIC | Age: 25
End: 2020-04-23
Payer: COMMERCIAL

## 2020-04-23 DIAGNOSIS — F41.9 ANXIETY: ICD-10-CM

## 2020-04-23 DIAGNOSIS — K52.9 GASTROENTERITIS: Primary | ICD-10-CM

## 2020-04-23 DIAGNOSIS — J44.89 CHRONIC OBSTRUCTIVE AIRWAY DISEASE WITH ASTHMA (H): ICD-10-CM

## 2020-04-23 DIAGNOSIS — Z21 ASYMPTOMATIC HUMAN IMMUNODEFICIENCY VIRUS (HIV) INFECTION STATUS (H): ICD-10-CM

## 2020-04-23 PROCEDURE — 99443 ZZC PHYSICIAN TELEPHONE EVALUATION 21-30 MIN: CPT | Performed by: FAMILY MEDICINE

## 2020-04-23 RX ORDER — MIRTAZAPINE 15 MG/1
TABLET, FILM COATED ORAL
Qty: 30 TABLET | Refills: 0 | Status: SHIPPED | OUTPATIENT
Start: 2020-04-23 | End: 2021-01-13

## 2020-04-23 RX ORDER — DIPHENOXYLATE HCL/ATROPINE 2.5-.025MG
1 TABLET ORAL 4 TIMES DAILY PRN
Qty: 20 TABLET | Refills: 0 | Status: SHIPPED | OUTPATIENT
Start: 2020-04-23 | End: 2020-04-28

## 2020-04-23 NOTE — PROGRESS NOTES
"Priscila Betancourt is a 25 year old female who is being evaluated via a billable telephone visit.      The patient has been notified of following:     \"This telephone visit will be conducted via a call between you and your physician/provider. We have found that certain health care needs can be provided without the need for a physical exam.  This service lets us provide the care you need with a short phone conversation.  If a prescription is necessary we can send it directly to your pharmacy.  If lab work is needed we can place an order for that and you can then stop by our lab to have the test done at a later time.    Telephone visits are billed at different rates depending on your insurance coverage. During this emergency period, for some insurers they may be billed the same as an in-person visit.  Please reach out to your insurance provider with any questions.    If during the course of the call the physician/provider feels a telephone visit is not appropriate, you will not be charged for this service.\"    Patient has given verbal consent for Telephone visit?  Yes    How would you like to obtain your AVS? Mail a copy    Subjective     Priscila Betancourt is a 25 year old female who presents to clinic today for the following health issues:    HPI  ABDOMINAL   PAIN     Onset: 2 days    Description:   Character: Sharp  Location: all over  Radiation: None and Back    Intensity: 9/10    Progression of Symptoms:  worsening    Accompanying Signs & Symptoms:  Fever/Chills?: YES  Gas/Bloating: YES  Nausea: YES  Vomitting: YES  Diarrhea?: YES  Constipation:YES  Dysuria or Hematuria: YES   History:   Trauma: no   Previous similar pain: YES   Previous tests done: Colonoscopy    Precipitating factors:   Does the pain change with:     Food: YES     BM: YES    Urination: YES    Alleviating factors:  Nothing     Therapies Tried and outcome: ginger ale     LMP:  4/13/2020     Past Medical History:   Diagnosis Date     Asthma     Hosp as child, " no intubations     Asymptomatic human immunodeficiency virus (HIV) infection status (H) 2019       Past Surgical History:   Procedure Laterality Date      SECTION N/A 10/26/2019    Procedure:  SECTION;  Surgeon: Ema Ricardo DO;  Location: [facility]     COLONOSCOPY      Rectal bleeding due to chronic constipation       Family History   Problem Relation Age of Onset     Family History Negative No family hx of        Social History     Tobacco Use     Smoking status: Never Smoker     Smokeless tobacco: Never Used   Substance Use Topics     Alcohol use: No                  Reviewed and updated as needed this visit by Provider         Review of Systems      REVIEW OF SYSTEMS    Generally has been not  feeling well for two days with watery diarreah and nausea with one episode of vomitting . No problems with vision, hearing, dental or neck pain.Has hph airborne or ingestion allergy  No chest pain, palpitations, dyspnea, change in bowel habits, blood  in stool or dyspepsia.  No rashes, changing moles, weakness, lassitude or back problems.  No chronic issues . No dysuria  Patient not  a smoker. No problems with significant headaches.         Objective   Reported vitals:  There were no vitals taken for this visit.   healthy, alert and mild distress  PSYCH: Alert and oriented times 3; coherent speech, normal   rate and volume, able to articulate logical thoughts, able   to abstract reason, no tangential thoughts, no hallucinations   or delusions  Her affect is normal  RESP: No cough, no audible wheezing, able to talk in full sentences  Remainder of exam unable to be completed due to telephone visits    Diagnostic Test Results:  Labs reviewed in Epic        Assessment/Plan:  1. Gastroenteritis  Discussed diarreah diet and anti diarreahal  - diphenoxylate-atropine (LOMOTIL) 2.5-0.025 MG tablet; Take 1 tablet by mouth 4 times daily as needed for diarrhea  Dispense: 20 tablet; Refill: 0    2.  Anxiety  Discussed ssri vs mirtazapine and sleep considerations   - mirtazapine (REMERON) 15 MG tablet; For anxiety  Dispense: 30 tablet; Refill: 0    No follow-ups on file.      Phone call duration:  21 minutes    Angel Jon MD

## 2020-07-28 ENCOUNTER — TRANSFERRED RECORDS (OUTPATIENT)
Dept: HEALTH INFORMATION MANAGEMENT | Facility: CLINIC | Age: 25
End: 2020-07-28

## 2020-12-05 ENCOUNTER — HOSPITAL ENCOUNTER (EMERGENCY)
Facility: CLINIC | Age: 25
Discharge: HOME | End: 2020-12-05
Attending: EMERGENCY MEDICINE | Admitting: EMERGENCY MEDICINE
Payer: COMMERCIAL

## 2020-12-05 ENCOUNTER — APPOINTMENT (OUTPATIENT)
Dept: ULTRASOUND IMAGING | Facility: CLINIC | Age: 25
End: 2020-12-05
Attending: EMERGENCY MEDICINE
Payer: COMMERCIAL

## 2020-12-05 VITALS
HEART RATE: 91 BPM | TEMPERATURE: 98.7 F | DIASTOLIC BLOOD PRESSURE: 66 MMHG | OXYGEN SATURATION: 99 % | SYSTOLIC BLOOD PRESSURE: 106 MMHG | RESPIRATION RATE: 16 BRPM

## 2020-12-05 DIAGNOSIS — R10.13 ABDOMINAL PAIN, EPIGASTRIC: ICD-10-CM

## 2020-12-05 DIAGNOSIS — K29.00 ACUTE GASTRITIS WITHOUT HEMORRHAGE, UNSPECIFIED GASTRITIS TYPE: ICD-10-CM

## 2020-12-05 DIAGNOSIS — R53.81 MALAISE: ICD-10-CM

## 2020-12-05 LAB
ALBUMIN SERPL-MCNC: 3.3 G/DL (ref 3.4–5)
ALBUMIN UR-MCNC: NEGATIVE MG/DL
ALP SERPL-CCNC: 72 U/L (ref 40–150)
ALT SERPL W P-5'-P-CCNC: 23 U/L (ref 0–50)
ANION GAP SERPL CALCULATED.3IONS-SCNC: 4 MMOL/L (ref 3–14)
APPEARANCE UR: CLEAR
AST SERPL W P-5'-P-CCNC: 39 U/L (ref 0–45)
B-HCG FREE SERPL-ACNC: <5 IU/L
BASOPHILS # BLD AUTO: 0 10E9/L (ref 0–0.2)
BASOPHILS NFR BLD AUTO: 0.3 %
BILIRUB SERPL-MCNC: 0.4 MG/DL (ref 0.2–1.3)
BILIRUB UR QL STRIP: NEGATIVE
BUN SERPL-MCNC: 8 MG/DL (ref 7–30)
CALCIUM SERPL-MCNC: 8.3 MG/DL (ref 8.5–10.1)
CHLORIDE SERPL-SCNC: 107 MMOL/L (ref 94–109)
CO2 SERPL-SCNC: 29 MMOL/L (ref 20–32)
COLOR UR AUTO: ABNORMAL
CREAT SERPL-MCNC: 0.71 MG/DL (ref 0.52–1.04)
DIFFERENTIAL METHOD BLD: ABNORMAL
EOSINOPHIL # BLD AUTO: 0 10E9/L (ref 0–0.7)
EOSINOPHIL NFR BLD AUTO: 0.3 %
ERYTHROCYTE [DISTWIDTH] IN BLOOD BY AUTOMATED COUNT: 14.6 % (ref 10–15)
GFR SERPL CREATININE-BSD FRML MDRD: >90 ML/MIN/{1.73_M2}
GLUCOSE SERPL-MCNC: 75 MG/DL (ref 70–99)
GLUCOSE UR STRIP-MCNC: NEGATIVE MG/DL
HCT VFR BLD AUTO: 39.7 % (ref 35–47)
HGB BLD-MCNC: 13.1 G/DL (ref 11.7–15.7)
HGB UR QL STRIP: NEGATIVE
IMM GRANULOCYTES # BLD: 0 10E9/L (ref 0–0.4)
IMM GRANULOCYTES NFR BLD: 0.3 %
KETONES UR STRIP-MCNC: NEGATIVE MG/DL
LACTATE BLD-SCNC: 1.6 MMOL/L (ref 0.7–2)
LEUKOCYTE ESTERASE UR QL STRIP: NEGATIVE
LIPASE SERPL-CCNC: 397 U/L (ref 73–393)
LYMPHOCYTES # BLD AUTO: 1.7 10E9/L (ref 0.8–5.3)
LYMPHOCYTES NFR BLD AUTO: 51.9 %
MCH RBC QN AUTO: 30.3 PG (ref 26.5–33)
MCHC RBC AUTO-ENTMCNC: 33 G/DL (ref 31.5–36.5)
MCV RBC AUTO: 92 FL (ref 78–100)
MONOCYTES # BLD AUTO: 0.3 10E9/L (ref 0–1.3)
MONOCYTES NFR BLD AUTO: 9.7 %
MUCOUS THREADS #/AREA URNS LPF: PRESENT /LPF
NEUTROPHILS # BLD AUTO: 1.2 10E9/L (ref 1.6–8.3)
NEUTROPHILS NFR BLD AUTO: 37.5 %
NITRATE UR QL: NEGATIVE
NRBC # BLD AUTO: 0 10*3/UL
NRBC BLD AUTO-RTO: 0 /100
PH UR STRIP: 7 PH (ref 5–7)
PLATELET # BLD AUTO: 256 10E9/L (ref 150–450)
POTASSIUM SERPL-SCNC: 3.9 MMOL/L (ref 3.4–5.3)
PROT SERPL-MCNC: 7.8 G/DL (ref 6.8–8.8)
RBC # BLD AUTO: 4.32 10E12/L (ref 3.8–5.2)
RBC #/AREA URNS AUTO: <1 /HPF (ref 0–2)
SODIUM SERPL-SCNC: 140 MMOL/L (ref 133–144)
SOURCE: ABNORMAL
SP GR UR STRIP: 1.02 (ref 1–1.03)
SQUAMOUS #/AREA URNS AUTO: 3 /HPF (ref 0–1)
UROBILINOGEN UR STRIP-MCNC: NORMAL MG/DL (ref 0–2)
WBC # BLD AUTO: 3.2 10E9/L (ref 4–11)
WBC #/AREA URNS AUTO: 1 /HPF (ref 0–5)

## 2020-12-05 PROCEDURE — U0003 INFECTIOUS AGENT DETECTION BY NUCLEIC ACID (DNA OR RNA); SEVERE ACUTE RESPIRATORY SYNDROME CORONAVIRUS 2 (SARS-COV-2) (CORONAVIRUS DISEASE [COVID-19]), AMPLIFIED PROBE TECHNIQUE, MAKING USE OF HIGH THROUGHPUT TECHNOLOGIES AS DESCRIBED BY CMS-2020-01-R: HCPCS | Performed by: EMERGENCY MEDICINE

## 2020-12-05 PROCEDURE — 96361 HYDRATE IV INFUSION ADD-ON: CPT

## 2020-12-05 PROCEDURE — C9803 HOPD COVID-19 SPEC COLLECT: HCPCS

## 2020-12-05 PROCEDURE — 84702 CHORIONIC GONADOTROPIN TEST: CPT

## 2020-12-05 PROCEDURE — 96375 TX/PRO/DX INJ NEW DRUG ADDON: CPT

## 2020-12-05 PROCEDURE — 258N000003 HC RX IP 258 OP 636: Performed by: EMERGENCY MEDICINE

## 2020-12-05 PROCEDURE — 96374 THER/PROPH/DIAG INJ IV PUSH: CPT

## 2020-12-05 PROCEDURE — 76705 ECHO EXAM OF ABDOMEN: CPT

## 2020-12-05 PROCEDURE — 81001 URINALYSIS AUTO W/SCOPE: CPT | Performed by: EMERGENCY MEDICINE

## 2020-12-05 PROCEDURE — 85025 COMPLETE CBC W/AUTO DIFF WBC: CPT | Performed by: EMERGENCY MEDICINE

## 2020-12-05 PROCEDURE — 80053 COMPREHEN METABOLIC PANEL: CPT | Performed by: EMERGENCY MEDICINE

## 2020-12-05 PROCEDURE — 83690 ASSAY OF LIPASE: CPT | Performed by: EMERGENCY MEDICINE

## 2020-12-05 PROCEDURE — 99285 EMERGENCY DEPT VISIT HI MDM: CPT | Mod: 25

## 2020-12-05 PROCEDURE — 83605 ASSAY OF LACTIC ACID: CPT | Performed by: EMERGENCY MEDICINE

## 2020-12-05 PROCEDURE — 250N000011 HC RX IP 250 OP 636: Performed by: EMERGENCY MEDICINE

## 2020-12-05 RX ORDER — PANTOPRAZOLE SODIUM 40 MG/1
40 TABLET, DELAYED RELEASE ORAL DAILY
Qty: 30 TABLET | Refills: 1 | Status: SHIPPED | OUTPATIENT
Start: 2020-12-05 | End: 2021-01-13

## 2020-12-05 RX ORDER — SODIUM CHLORIDE 9 MG/ML
INJECTION, SOLUTION INTRAVENOUS CONTINUOUS
Status: DISCONTINUED | OUTPATIENT
Start: 2020-12-05 | End: 2020-12-05 | Stop reason: HOSPADM

## 2020-12-05 RX ORDER — ONDANSETRON 4 MG/1
4 TABLET, ORALLY DISINTEGRATING ORAL EVERY 8 HOURS PRN
Qty: 10 TABLET | Refills: 0 | Status: SHIPPED | OUTPATIENT
Start: 2020-12-05 | End: 2020-12-08

## 2020-12-05 RX ORDER — KETOROLAC TROMETHAMINE 15 MG/ML
10 INJECTION, SOLUTION INTRAMUSCULAR; INTRAVENOUS ONCE
Status: COMPLETED | OUTPATIENT
Start: 2020-12-05 | End: 2020-12-05

## 2020-12-05 RX ORDER — DIPHENHYDRAMINE HYDROCHLORIDE 50 MG/ML
25 INJECTION INTRAMUSCULAR; INTRAVENOUS ONCE
Status: COMPLETED | OUTPATIENT
Start: 2020-12-05 | End: 2020-12-05

## 2020-12-05 RX ADMIN — PROMETHAZINE HYDROCHLORIDE 12.5 MG: 25 INJECTION INTRAMUSCULAR; INTRAVENOUS at 16:03

## 2020-12-05 RX ADMIN — SODIUM CHLORIDE 1000 ML: 9 INJECTION, SOLUTION INTRAVENOUS at 15:47

## 2020-12-05 RX ADMIN — DIPHENHYDRAMINE HYDROCHLORIDE 25 MG: 50 INJECTION, SOLUTION INTRAMUSCULAR; INTRAVENOUS at 15:50

## 2020-12-05 RX ADMIN — KETOROLAC TROMETHAMINE 10 MG: 15 INJECTION, SOLUTION INTRAMUSCULAR; INTRAVENOUS at 15:50

## 2020-12-05 ASSESSMENT — ENCOUNTER SYMPTOMS
COUGH: 0
FEVER: 1
ABDOMINAL PAIN: 1
APPETITE CHANGE: 1
MYALGIAS: 1
NAUSEA: 1
DIARRHEA: 1
VOMITING: 1

## 2020-12-05 NOTE — ED PROVIDER NOTES
History     Chief Complaint:  Abdominal pain      The history is provided by the patient.     Priscila Betancourt is a 25 year old year old female with a history of asthma who presents for evaluation of abdominal pain. The patient states that she woke up with severe abdominal pain and had two episodes of watery diarrhea; after returning to her bed the she took two ibuprofen at 0930 this morning but saw no relief of pain. The patient notes that the abdominal pain is sharp, radiates across her entire abdomen and is accompanied with generalized body aches, cramps, fever and nausea. She tells us that she has been unable to eat anything all day and has only drank Gatorade. Patient denies any of her family members or other close contacts experiencing the same symptoms. Patient denies urinary symptoms, cough, history of bladder surgery or bladder infection.     Allergies:  No Known Drug Allergies    Medications:   Epzicom  Tivicay  Prozac  Remeron   Senokot     Medical History:   Asthma  Asymptomatic human immunodeficiency Virus  Indication for care in labor and delivery   Moderate persistent asthma     Surgical History:  C section   Colonoscopy     Family History:   Asthma    Social History:  Smoke: No  Alcohol: Yes  Drug: No        Review of Systems   Constitutional: Positive for appetite change and fever.   Respiratory: Negative for cough.    Gastrointestinal: Positive for abdominal pain, diarrhea, nausea and vomiting.   Genitourinary: Negative.    Musculoskeletal: Positive for myalgias.   All other systems reviewed and are negative.    See HPI all other systems negative    Physical Exam     Patient Vitals for the past 24 hrs:   BP Temp Temp src Pulse Resp SpO2   12/05/20 1459 114/76 98.7  F (37.1  C) Temporal 92 14 100 %       Physical Exam      GEN: Appears fatigued.    HEAD: atraumatic    EYES: pupils reactive, extraocular muscles intact, conjunctivae normal    ENT: Moist oral mucosa, oral pharynx clear; nose  clear    NECK: Normal ROM, trachea midline    RESPIRATORY: no tachypnea, normal work of breathing, breath sounds clear to auscultation    CVS: normal S1/S2, no murmurs/rubs/gallops    ABDOMEN: soft, Right upper quadrant and epigastric region tender to palpation. no masses or organomegaly, no rebound, positive bowel sounds    MUSCULOSKELETAL: no deformities    SKIN: warm and dry, no acute rashes or ulceration, no erythema     NEURO: GCS 15, cranial nerves intact.  Motor and sensory- no focal deficits.     LYMPH: no lymphadenopathy    Emergency Department Course     Imaging:  Radiology findings were communicated with the patient who voiced understanding of the findings.    US Abdomen Limited:  1.  The gallbladder is unremarkable.  2.  4.4 cm lesion in the central liver with uniform increased  echogenicity, most likely represents a hemangioma. Consider  nonemergent follow-up hepatic MRI for confirmation. Reading per radiology.     Laboratory:  Laboratory findings were communicated with the patient who voiced understanding of the findings.    CBC: WBC: 3.2(L), HGB: 13.1, PLT: 256    CMP: Glucose 75, Calcium 8.3(L); Albumin 3.3(L), o/w WNL (Creatinine: 0.71)    Lipase - 397(H)    1546 Lactic Acid - 1.6    UA with Microscopic: Squamous Epithelial 3(H); Mucous Present o/w Negative    POCT - <5.0    Symptomatic COVID-19: Pending     Interventions:  1547 NS 1L IV  1550 Benadryl 25 mg IV  1550 Toradol 10 mg IV  1603 Phenergan 12.5 mg IV    Emergency Department Course:  Past medical records, nursing notes, and vitals reviewed.    3:49 PM I performed an exam of the patient as documented above.     IV was inserted and blood was drawn for laboratory testing, results above.  The patient provided a urine sample here in the emergency department. This was sent for laboratory testing, findings above.  The patient was sent for an abdominal ultrasound while in the emergency department, results above.     1815 I rechecked the patient and  discussed the results of her workup thus far.     Findings and plan explained to the Patient. Patient discharged home with instructions regarding supportive care, medications, and reasons to return. The importance of close follow-up was reviewed. The patient was prescribed Zofran and Protonix.     I personally reviewed the laboratory and imaging results with the Patient and answered all related questions prior to discharge.     Impression & Plan     Medical Decision Making:  Priscila Betancourt is a 25 year old female who presents today for abdominal pain with nausea and diarrhea x2.  Her symptoms fully resolved with Phenergan and Benadryl and Toradol.  I did note that she had some epigastric tenderness which may be gastritis and it sounds like she is having some acid reflux as well.  However ultrasound was negative for biliary disease.  Currently she is drinking fluids and eating crackers and feeling much improved.  There are no clinical findings or indications at this point for advanced imaging studies.  I have recommended Zofran for nausea every 6 hours clear liquids over the next 12 to 18 hours thereafter advance diet as tolerated cautiously.  Of also recommended Protonix 40 mg p.o. daily over the next 14 days and follow-up with PCP in the next 48 hours.  If however during that time she should develop a fever recurrence of her pain or other new symptoms she should return to the ED.    Diagnosis:    ICD-10-CM    1. Abdominal pain, epigastric  R10.13    2. Malaise  R53.81    3. Acute gastritis without hemorrhage, unspecified gastritis type  K29.00        Disposition:  Discharged to home.    Discharge Medications:  New Prescriptions    ONDANSETRON (ZOFRAN ODT) 4 MG ODT TAB    Take 1 tablet (4 mg) by mouth every 8 hours as needed    PANTOPRAZOLE (PROTONIX) 40 MG EC TABLET    Take 1 tablet (40 mg) by mouth daily       Scribe Disclosure:  Ion ASRABIA, am serving as a scribe at 3:49 PM on 12/5/2020 to document services  personally performed by Wyatt Neal MD, based on my observations and the provider's statements to me.      Wyatt Neal MD  12/05/20 [id number]

## 2020-12-05 NOTE — ED AVS SNAPSHOT
[facility]  [address]  [facility] [address]  Phone: [phone]  Fax: [phone]                                    Priscila Betancourt   MRN: [id number]    Department: [facility]   Date of Visit: 12/5/2020           After Visit Summary Signature Page    I have received my discharge instructions, and my questions have been answered. I have discussed any challenges I see with this plan with the nurse or doctor.    ..........................................................................................................................................  Patient/Patient Representative Signature      ..........................................................................................................................................  Patient Representative Print Name and Relationship to Patient    ..................................................               ................................................  Date                                   Time    ..........................................................................................................................................  Reviewed by Signature/Title    ...................................................              ..............................................  Date                                               Time          22EPIC Rev 08/18

## 2020-12-05 NOTE — ED TRIAGE NOTES
A&O x4, ABCs intact. Pt presents with diffuse abdominal pain and diarrhea that started this morning. Pt states that she has some SOB but that is normal for her because she has asthma. Pt also reports chest discomfort and headache..

## 2020-12-06 NOTE — DISCHARGE INSTRUCTIONS
Discharge Instructions  Adult Diarrhea    You have been seen today for diarrhea. This is usually caused by a virus, but some bacteria, parasites, medicines or other medical conditions can cause similar symptoms. At this time your doctor does not find that your diarrhea is a sign of anything dangerous or life-threatening. However, sometimes the signs of serious illness do not show up right away. If you have new or worse symptoms, you may need to be seen again in the Emergency Department or by your primary doctor.     Return to the Emergency Department if:  You feel you are getting dehydrated, such as being very thirsty, not urinating at least every 8-12 hours, or feeling faint or lightheaded.   You develop a new fever, or your fever continues for more than 2 days.   You have belly pain that seems worse than cramps, is in one spot, or is getting worse over time.   You have blood in your stool or your stool becomes black.  (Remember that if you take Pepto-Bismol , this will turn your stool black).   You feel very weak.  You are not starting to improve within 24 hours of your visit here.    What can I do to help myself?  The most important thing to do is to drink clear liquids.   It is best to have only small, frequent sips of liquids. Drinking too much at once may cause more diarrhea. You should also replace minerals, sodium and potassium lost with diarrhea. Pedialyte  and sports drinks can help you replace these minerals. You can also drink clear liquids such as water, weak tea, apple juice, and 7-Up . Avoid acid liquids (orange), caffeine (coffee) or alcohol. Milk products will make the diarrhea worse.   Eat only bland foods. Soda crackers, toast, plain noodles, gelatin, applesauce and bananas are good first choices. Avoid foods that have acid, are spicy, fatty or fibrous (such as meats, coarse grains, vegetables). You may start eating these foods again in about 3 days when you are better.   Sometimes treatment  "includes prescription medicine to prevent diarrhea. If your doctor prescribes these for you, take them as directed.   Nonprescription medicine is available for the treatment of diarrhea and can be very effective. If you use it, make sure you use the dose recommended on the package. Check with your healthcare provider before you use any medicine for diarrhea.   Don t take ibuprofen, or other nonsteroidal anti-inflammatory medicines without checking with your healthcare provider.   Probiotics: If you have been given an antibiotic, you may want to also take a probiotic pill or eat yogurt with live cultures. Probiotics have \"good bacteria\" to help your intestines stay healthy. Studies have shown that probiotics help prevent diarrhea and other intestine problems (including C. diff infection) when you take antibiotics. You can buy these without a prescription in the pharmacy section of the store.   If you were given a prescription for medicine here today, be sure to read all of the information (including the package insert) that comes with your prescription.  This will include important information about the medicine, its side effects, and any warnings that you need to know about.  The pharmacist who fills the prescription can provide more information and answer questions you may have about the medicine.  If you have questions or concerns that the pharmacist cannot address, please call or return to the Emergency Department.   Opioid Medication Information    Pain medications are among the most commonly prescribed medicines, so we are including this information for all our patients. If you did not receive pain medication or get a prescription for pain medicine, you can ignore it.     You may have been given a prescription for an opioid (narcotic) pain medicine and/or have received a pain medicine while here in the Emergency Department. These medicines can make you drowsy or impaired. You must not drive, operate dangerous " equipment, or engage in any other dangerous activities while taking these medications. If you drive while taking these medications, you could be arrested for DUI, or driving under the influence. Do not drink any alcohol while you are taking these medications.     Opioid pain medications can cause addiction. If you have a history of chemical dependency of any type, you are at a higher risk of becoming addicted to pain medications.  Only take these prescribed medications to treat your pain when all other options have been tried. Take it for as short a time and as few doses as possible. Store your pain pills in a secure place, as they are frequently stolen and provide a dangerous opportunity for children or visitors in your house to start abusing these powerful medications. We will not replace any lost or stolen medicine.  As soon as your pain is better, you should flush all your remaining medication.     Many prescription pain medications contain Tylenol  (acetaminophen), including Vicodin , Tylenol #3 , Norco , Lortab , and Percocet .  You should not take any extra pills of Tylenol  if you are using these prescription medications or you can get very sick.  Do not ever take more than 3000 mg of acetaminophen in any 24 hour period.    All opioids tend to cause constipation. Drink plenty of water and eat foods that have a lot of fiber, such as fruits, vegetables, prune juice, apple juice and high fiber cereal.  Take a laxative if you don t move your bowels at least every other day. Miralax , Milk of Magnesia, Colace , or Senna  can be used to keep you regular.      Remember that you can always come back to the Emergency Department if you are not able to see your regular doctor in the amount of time listed above, if you get any new symptoms, or if there is anything that worries you.    Discharge Instructions  COVID-19    COVID-19 is the disease caused by a new coronavirus. The virus spreads from person-to-person  primarily by droplets when an infected person coughs or sneezes and the droplet either lands on another person or that other person touches a surface with the droplet on it. There are tests available to diagnose COVID-19. There is no specific treatment or medicine for the disease.    You may have been diagnosed with COVID, may be being tested for COVID and have a pending test result, or may have been exposed to COVID.    Symptoms of COVID-19  Many people have no symptoms or mild symptoms.  Symptoms may usually appear 4 to 5 days (up to 14 days) after contact with a person with COVID-19. Some people will get severe symptoms and pneumonia. Usual symptoms are:     ? Fever  ? Cough  ? Trouble breathing    Less common symptoms are: Headache, body aches, sore throat, sneezing, diarrhea,loss of taste or smell.    Isolation and Quarantine    You were seen because you have symptoms, had an exposure, or had some other concern about possible COVID. The best way to stop the spread of the virus is to avoid contact with others.  Isolation refers to sick people staying away from people who are not sick. A person in quarantine is limiting activity because they were exposed and are waiting to see if they might become sick.    If you test positive for COVID, you should stay home (isolation) for at least 10 days after your symptoms began, and for 24 hours with no fever and improvement of symptoms--whichever is longer. (Your fever should be gone for 24 hours without using fever-reducing medicine). If you have no symptoms, you should stay home (isolation) for 10 days from the day of the test.    For example, if you have a fever and cough for 6 days, you need to stay home 4 more days with no fever for a total of 10 days. Or, if you have a fever and cough for 10 days, you need to stay home one more day with no fever for a total of 11 days.    If you have a high-risk exposure to COVID (you spent 15 minutes or more within six feet of  somebody who has COVID), you should stay home (quarantine) for 14 days. Even if you test negative for COVID, the CDC recommends a 14-day quarantine from the time of your last exposure to that individual.    If you have symptoms but a negative test, you should stay at home until you are symptom-free and without fever for 24 hours, using the same judgment you would for when it is safe to return to work/school from strep throat, influenza, or the common cold. If you worsen, you should consider being re-evaluated.    If you are being tested for COVID and your test is pending, you should stay home until you know your test result.    How should I protect myself and others?    Do not go to work or school. Have a friend or relative do your shopping. Do not use public transportation (bus, train) or ridesharing (Lyft, Uber).    Separate yourself from other people in your home.?As much as possible, you should stay in one room and away from other people in your home. Also, use a separate bathroom, if possible. Avoid handling pets or other animals while sick.     Wear a facemask if you need to be around other people and cover your mouth and nose with a tissue when you cough or sneeze.     Avoid sharing personal household items. You should not share dishes, drinking glasses, forks/knives/spoons, towels, or bedding with other people in your home. After using these items, they should be washed with soap and water. Clean parts of your home that are touched often (doorknobs, faucets, countertops, etc.) daily.     Wash your hands often with soap and water for at least 20 seconds or use an alcohol-based hand  containing at least 60% alcohol.     Avoid touching your face.    Treat your symptoms. You can take Acetaminophen (Tylenol) to treat body aches and fever as needed for comfort. Ibuprofen (Advil or Motrin) can be used as well if you still have symptoms after taking Tylenol. Drink fluids. Rest.    Watch for worsening  symptoms such as shortness of breath/difficulty breathing or very severe weakness.    Employers/workplaces are being asked by the Centers for Disease Control (CDC) to not request notes/documentation for you to return to work or prove that you were ill. You may choose to show your employer this paperwork. Also, repeat testing should not be required to return to work.    Return to the Emergency Department if:    If you are developing worsening breathing, shortness of breath, or feel worse you should seek medical attention.  If you are uncertain, contact your health care provider/clinic. If you need emergency medical attention, call 911 and tell them you have been ill.

## 2020-12-07 LAB
SARS-COV-2 RNA SPEC QL NAA+PROBE: ABNORMAL
SPECIMEN SOURCE: ABNORMAL

## 2020-12-08 DIAGNOSIS — U07.1 COVID-19 VIRUS INFECTION: Primary | ICD-10-CM

## 2020-12-09 ENCOUNTER — PATIENT OUTREACH (OUTPATIENT)
Dept: CARE COORDINATION | Facility: CLINIC | Age: 25
End: 2020-12-09

## 2020-12-09 NOTE — PROGRESS NOTES
"Clinic Care Coordination Contact  [facility]/Voicemail       Clinical Data: Care Coordinator Outreach  Outreach attempted x 1.  Unable to dial call, message repeatedly saying \"were sorry the number you have dialed has calling restrictions that have presented the completion of your call\" after multiple attempts.     Chart Review: Referral from discharge report, COVID positive. In ED for abdominal pain. Discharged to home, needs follow up with PCP.     Plan: Care Coordinator will try to reach patient again in 1-2 business days.    SULEIMAN Beaver, B.S. [facility]  Clinic Care Coordination  [facility]:  [place], [place], [place], [place], and [place]  Phone: [phone]        "

## 2020-12-09 NOTE — LETTER
[place] CARE COORDINATION  [facility]  December 10, 2020    Priscila Betancourt  [address] [address] [address]      Dear Priscila,    I am a clinic community health worker who works with Angel Jon MD at the [facility] . I have been trying to reach you recently to introduce Clinic Care Coordination and to see if there was anything I could assist you with.  Below is a description of clinic care coordination and how I can further assist you.      The clinic care coordination team is made up of a registered nurse,  and community health worker who understand the health care system. The goal of clinic care coordination is to help you manage your health and improve access to the health care system in the most efficient manner. The team can assist you in meeting your health care goals by providing education, coordinating services, strengthening the communication among your providers and supporting you with any resource needs.    Please feel free to contact me at [phone] with any questions or concerns. We are focused on providing you with the highest-quality healthcare experience possible and that all starts with you.     Sincerely,     SULEIMAN Beaver, B.S. [facility]  Clinic Care Coordination  [facility]:  [place], [place], [place], [place], and [place]  Phone: [phone]

## 2020-12-10 NOTE — PROGRESS NOTES
"Clinic Care Coordination Contact  [facility]/Voicemail       Clinical Data: Care Coordinator Outreach  Outreach attempted x 2. Unable to dial call, message repeatedly saying \"were sorry the number you have dialed has calling restrictions that have presented the completion of your call\" after multiple attempts.     Plan: Care Coordinator will send care coordination introduction letter with care coordinator contact information and explanation of care coordination services via [employer]. Care Coordinator will do no further outreaches at this time.    SULEIMAN Beaver, B.S. [facility]  Clinic Care Coordination  [facility]:  [place], [place], [place], [place], and [place]  Phone: [phone]          "

## 2021-01-05 ENCOUNTER — HOSPITAL ENCOUNTER (EMERGENCY)
Facility: CLINIC | Age: 26
Discharge: HOME | End: 2021-01-05
Attending: EMERGENCY MEDICINE | Admitting: EMERGENCY MEDICINE
Payer: COMMERCIAL

## 2021-01-05 ENCOUNTER — APPOINTMENT (OUTPATIENT)
Dept: ULTRASOUND IMAGING | Facility: CLINIC | Age: 26
End: 2021-01-05
Attending: EMERGENCY MEDICINE
Payer: COMMERCIAL

## 2021-01-05 VITALS
OXYGEN SATURATION: 98 % | RESPIRATION RATE: 18 BRPM | DIASTOLIC BLOOD PRESSURE: 67 MMHG | HEART RATE: 100 BPM | SYSTOLIC BLOOD PRESSURE: 102 MMHG | TEMPERATURE: 98.9 F

## 2021-01-05 DIAGNOSIS — N30.01 ACUTE CYSTITIS WITH HEMATURIA: ICD-10-CM

## 2021-01-05 DIAGNOSIS — N93.8 DUB (DYSFUNCTIONAL UTERINE BLEEDING): ICD-10-CM

## 2021-01-05 DIAGNOSIS — N83.201 RIGHT OVARIAN CYST: ICD-10-CM

## 2021-01-05 LAB
ALBUMIN UR-MCNC: 50 MG/DL
ANION GAP SERPL CALCULATED.3IONS-SCNC: 3 MMOL/L (ref 3–14)
APPEARANCE UR: ABNORMAL
B-HCG FREE SERPL-ACNC: <5 IU/L
BACTERIA #/AREA URNS HPF: ABNORMAL /HPF
BASOPHILS # BLD AUTO: 0 10E9/L (ref 0–0.2)
BASOPHILS NFR BLD AUTO: 0.4 %
BILIRUB UR QL STRIP: NEGATIVE
BUN SERPL-MCNC: 6 MG/DL (ref 7–30)
CALCIUM SERPL-MCNC: 8.5 MG/DL (ref 8.5–10.1)
CHLORIDE SERPL-SCNC: 110 MMOL/L (ref 94–109)
CO2 SERPL-SCNC: 28 MMOL/L (ref 20–32)
COLOR UR AUTO: ABNORMAL
CREAT SERPL-MCNC: 0.77 MG/DL (ref 0.52–1.04)
DIFFERENTIAL METHOD BLD: ABNORMAL
EOSINOPHIL # BLD AUTO: 0 10E9/L (ref 0–0.7)
EOSINOPHIL NFR BLD AUTO: 0.3 %
ERYTHROCYTE [DISTWIDTH] IN BLOOD BY AUTOMATED COUNT: 16 % (ref 10–15)
GFR SERPL CREATININE-BSD FRML MDRD: >90 ML/MIN/{1.73_M2}
GLUCOSE SERPL-MCNC: 83 MG/DL (ref 70–99)
GLUCOSE UR STRIP-MCNC: NEGATIVE MG/DL
HCT VFR BLD AUTO: 38 % (ref 35–47)
HGB BLD-MCNC: 13 G/DL (ref 11.7–15.7)
HGB UR QL STRIP: ABNORMAL
IMM GRANULOCYTES # BLD: 0 10E9/L (ref 0–0.4)
IMM GRANULOCYTES NFR BLD: 0.1 %
KETONES UR STRIP-MCNC: NEGATIVE MG/DL
LEUKOCYTE ESTERASE UR QL STRIP: ABNORMAL
LYMPHOCYTES # BLD AUTO: 3 10E9/L (ref 0.8–5.3)
LYMPHOCYTES NFR BLD AUTO: 39.4 %
MCH RBC QN AUTO: 31.8 PG (ref 26.5–33)
MCHC RBC AUTO-ENTMCNC: 34.2 G/DL (ref 31.5–36.5)
MCV RBC AUTO: 93 FL (ref 78–100)
MONOCYTES # BLD AUTO: 0.7 10E9/L (ref 0–1.3)
MONOCYTES NFR BLD AUTO: 8.7 %
NEUTROPHILS # BLD AUTO: 3.8 10E9/L (ref 1.6–8.3)
NEUTROPHILS NFR BLD AUTO: 51.1 %
NITRATE UR QL: NEGATIVE
NRBC # BLD AUTO: 0 10*3/UL
NRBC BLD AUTO-RTO: 0 /100
PH UR STRIP: 6.5 PH (ref 5–7)
PLATELET # BLD AUTO: 309 10E9/L (ref 150–450)
POTASSIUM SERPL-SCNC: 3.4 MMOL/L (ref 3.4–5.3)
RBC # BLD AUTO: 4.09 10E12/L (ref 3.8–5.2)
RBC #/AREA URNS AUTO: 133 /HPF (ref 0–2)
SODIUM SERPL-SCNC: 141 MMOL/L (ref 133–144)
SOURCE: ABNORMAL
SP GR UR STRIP: 1.01 (ref 1–1.03)
SQUAMOUS #/AREA URNS AUTO: 2 /HPF (ref 0–1)
TRANS CELLS #/AREA URNS HPF: 3 /HPF (ref 0–1)
UROBILINOGEN UR STRIP-MCNC: NORMAL MG/DL (ref 0–2)
WBC # BLD AUTO: 7.5 10E9/L (ref 4–11)
WBC #/AREA URNS AUTO: >182 /HPF (ref 0–5)
WBC CLUMPS #/AREA URNS HPF: PRESENT /HPF

## 2021-01-05 PROCEDURE — 76830 TRANSVAGINAL US NON-OB: CPT

## 2021-01-05 PROCEDURE — 85025 COMPLETE CBC W/AUTO DIFF WBC: CPT | Performed by: EMERGENCY MEDICINE

## 2021-01-05 PROCEDURE — 80048 BASIC METABOLIC PNL TOTAL CA: CPT | Performed by: EMERGENCY MEDICINE

## 2021-01-05 PROCEDURE — 81001 URINALYSIS AUTO W/SCOPE: CPT | Performed by: EMERGENCY MEDICINE

## 2021-01-05 PROCEDURE — 99285 EMERGENCY DEPT VISIT HI MDM: CPT | Mod: 25

## 2021-01-05 PROCEDURE — 84702 CHORIONIC GONADOTROPIN TEST: CPT

## 2021-01-05 PROCEDURE — 87086 URINE CULTURE/COLONY COUNT: CPT | Performed by: EMERGENCY MEDICINE

## 2021-01-05 PROCEDURE — 87186 SC STD MICRODIL/AGAR DIL: CPT | Performed by: EMERGENCY MEDICINE

## 2021-01-05 PROCEDURE — 87088 URINE BACTERIA CULTURE: CPT | Performed by: EMERGENCY MEDICINE

## 2021-01-05 PROCEDURE — 250N000011 HC RX IP 250 OP 636: Performed by: EMERGENCY MEDICINE

## 2021-01-05 PROCEDURE — 96374 THER/PROPH/DIAG INJ IV PUSH: CPT

## 2021-01-05 RX ORDER — IBUPROFEN 200 MG
400 TABLET ORAL EVERY 8 HOURS PRN
Qty: 60 TABLET | Refills: 0 | Status: SHIPPED | OUTPATIENT
Start: 2021-01-05 | End: 2021-01-13

## 2021-01-05 RX ORDER — CEPHALEXIN 500 MG/1
500 CAPSULE ORAL 2 TIMES DAILY
Qty: 14 CAPSULE | Refills: 0 | Status: SHIPPED | OUTPATIENT
Start: 2021-01-05 | End: 2021-01-12

## 2021-01-05 RX ORDER — KETOROLAC TROMETHAMINE 15 MG/ML
15 INJECTION, SOLUTION INTRAMUSCULAR; INTRAVENOUS ONCE
Status: COMPLETED | OUTPATIENT
Start: 2021-01-05 | End: 2021-01-05

## 2021-01-05 RX ADMIN — KETOROLAC TROMETHAMINE 15 MG: 15 INJECTION, SOLUTION INTRAMUSCULAR; INTRAVENOUS at 08:22

## 2021-01-05 ASSESSMENT — ENCOUNTER SYMPTOMS
SHORTNESS OF BREATH: 0
FEVER: 0
COUGH: 0
NAUSEA: 0
CHILLS: 0
VOMITING: 0
DYSURIA: 1

## 2021-01-05 NOTE — ED PROVIDER NOTES
"  History   Chief Complaint:  Vaginal Bleeding       HPI   Priscila Betancourt is a 25 year old female with history of HIV on suppresent medications who presents for evaluation of heavy vaginal bleeding and pelvic pressure.  Patient reports waking up this morning and her bed sheets were soaked with blood.  She also has pelvic pressure sensation and feelings of \"pins-and-needles\".  She had burning sensation with urination today.  States that she previously has had heavy periods in the past but over the last year have not had any periods secondary to having Nexplanon.  She denies any fever, chills, nausea/vomiting, respiratory symptoms.  Patient reports chronic diarrhea and occasional constipation which are not new or different.  She denies any chance of pregnancy.  She denied any abnormal vaginal discharge prior.  No family history of bleeding diathesis.      Review of Systems   Constitutional: Negative for chills and fever.   Respiratory: Negative for cough and shortness of breath.    Gastrointestinal: Negative for nausea and vomiting.   Genitourinary: Positive for dysuria, pelvic pain and vaginal bleeding. Negative for vaginal discharge.   All other systems reviewed and are negative.      Allergies:  The patient has no known allergies.     Medications:  Flonase  Remeron  Triumeq    Past Medical History:    HIV  Asthma      Past Surgical History:    Colonoscopy  C section      Family History:    Denies family history of bleeding diathesis    Social History:  Patient presents to the ED alone.    Physical Exam     Patient Vitals for the past 24 hrs:   BP Temp Temp src Pulse Resp SpO2   01/05/21 0938 102/67 -- -- 100 -- --   01/05/21 0859 -- -- -- -- -- 98 %   01/05/21 0705 116/83 98.9  F (37.2  C) Oral 98 18 98 %       Physical Exam  General: Alert, no acute distress; nontoxic appearing  HEENT:  Moist mucous membranes.  Conjunctiva normal.  CV:  RRR, no m/r/g, skin warm and well perfused  Pulm:  CTAB, no " wheezes/ronchi/rales.  No acute distress, breathing comfortably  GI:  Soft, nontender, nondistended.  No rebound or guarding.  Normal bowel sounds  MSK:  Moving all extremities.  No focal areas of edema, erythema; no CVA tenderness  Skin:  WWP, no rashes, no lower extremity edema, skin color normal, no diaphoresis  Psych:  Well-appearing, normal affect, regular speech    Emergency Department Course     Imaging:  US pelvic complete with transvaginal:  1. The right ovary contains a small probable collapsing cyst measuring 1.1 cm. 2. Trace amount of free fluid in the pelvis is nonspecific, and may be physiologic. Reading per radiology.    Laboratory:  CBC: WBC: 7.5, HGB: 13.0, PLT: 309  BMP: Chloride: 110 (H), Urea Nitrogen: 6 (low), o/w WNL (Creatinine: 0.77)  UA: Blood: large (!), Protein Albumin: 50 (!), Leukocyte Esterase: large (!), WBC: >182 (H), WBC clumps: Present, RBC: 133 (H), Bacteria: moderate (!), Squamous Epithelial: 2 (H), Transitional epi: 3 (H) o/w Negative  Urine Culture Aerobic Bacterial: Pending  ISTAT HCG quantitative pregnancy POCT: <5.0    Emergency Department Course:    Reviewed:  vitals, past medical history and care everywhere    Assessments:    0732 Initial assessment     0936 I rechecked the patient and discussed the results of her workup thus far. Patient's bleeding has improved and she is requesting an antibiotic IV.      Interventions:  0822 Toradol 15 mg IV    Disposition:  The patient was discharged to home.     Impression & Plan     Medical Decision Making:  Priscila Betancourt is a 25 year old female who presents for evaluation of uterine bleeding.  This is clinically consistent with dysfunctional uterine bleeding.  An ultrasound shows that the right ovary contains a small probable collapsing cyst measuring 1.1 cm and also there are trace amount of free fluid in the pelvis is nonspecific, and may be physiologic. Patient is not pregnant. There are no signs at this point of acute medical  issues causing DUB to warrant further workup in this patient (pituitary tumor, thyroid disease, etc).  Her hemoglobin is reassurring.  Will have her follow-up with her PCP as outpatient if continued symptoms.  She will use Ibuprofen as needed for cramping/bleeding.  Discussed signs and symptoms that should prompt her urgent ER return including soaking 1 pad per hour, fevers, worsening pain, or any new concerning symptom.   In addition, patient reports dysuria that started today.  Her urine shows hematuria, pyuria although in setting of vaginal bleeding, hard to interpret as true UTI.  We will culture patient's urine.  She would prefer to start antibiotics given her dysuria which I feel is reasonable as we await cultures.  If these cultures are negative, she will stop her antibiotic.  She will follow-up with her PCP in 3 days for this.  There is nothing in her history or exam to suggest pyelonephritis, concomitant kidney stone, renal abscess or other that would require advanced imaging or further work-up at this time.      Diagnosis:    ICD-10-CM    1. DUB (dysfunctional uterine bleeding)  N93.8    2. Right ovarian cyst  N83.201    3. Acute cystitis with hematuria  N30.01        Discharge Medications:  Discharge Medication List as of 1/5/2021  9:31 AM      START taking these medications    Details   cephALEXin (KEFLEX) 500 MG capsule Take 1 capsule (500 mg) by mouth 2 times daily for 7 days, Disp-14 capsule, R-0, Local Print      !! ibuprofen (ADVIL/MOTRIN) 200 MG tablet Take 2 tablets (400 mg) by mouth every 8 hours as needed for fever or pain, Disp-60 tablet, R-0, Local Print       !! - Potential duplicate medications found. Please discuss with provider.          Scribe Disclosure:  I, Charles Potter, am serving as a scribe at 7:22 AM on 1/5/2021 to document services personally performed by Alek Coburn MD based on my observations and the provider's statements to me.            Alek Coburn,  MD  01/05/21 1009

## 2021-01-05 NOTE — ED TRIAGE NOTES
"Patient arrives via EMS from home. Called because at 0400 she woke up in a pool of her own blood, with some clots.  Uterus feels like \"pins and needles\". Complaining of pain and the need to pee.   Patient set up with bedside commode and hat  Patient denies possibility of pregnancy. Has had one previous delivery.  "

## 2021-01-05 NOTE — ED NOTES
Patient discharged home with discharge paperwork and printed prescriptions. Vital signs stable at discharge. Education provided regarding medication use and when to follow up with PCP. Pt verbalized understanding. IV removed. Catheter intact. All questions answered.

## 2021-01-05 NOTE — ED AVS SNAPSHOT
[facility] Emergency Dept  [address]  [facility] [address]  Phone: [phone]  Fax: [phone]                                    Priscila Betancourt   MRN: [id number]    Department: [facility] Emergency Dept   Date of Visit: 1/5/2021           After Visit Summary Signature Page    I have received my discharge instructions, and my questions have been answered. I have discussed any challenges I see with this plan with the nurse or doctor.    ..........................................................................................................................................  Patient/Patient Representative Signature      ..........................................................................................................................................  Patient Representative Print Name and Relationship to Patient    ..................................................               ................................................  Date                                   Time    ..........................................................................................................................................  Reviewed by Signature/Title    ...................................................              ..............................................  Date                                               Time          22EPIC Rev 08/18

## 2021-01-08 LAB
BACTERIA SPEC CULT: ABNORMAL
BACTERIA SPEC CULT: ABNORMAL
Lab: ABNORMAL
SPECIMEN SOURCE: ABNORMAL

## 2021-01-09 ENCOUNTER — TELEPHONE (OUTPATIENT)
Dept: EMERGENCY MEDICINE | Facility: CLINIC | Age: 26
End: 2021-01-09

## 2021-01-09 NOTE — TELEPHONE ENCOUNTER
[facility] Emergency Department/Urgent Care Lab result notification [Positive for uti and bacteria is susceptible to antibiotic]:    Reason for call:   Notify of Final urine culture result, confirm patient is taking antibiotic, assess symptoms, and advise per Emergency Dept/Urgent Care discharge instructions and Emergency Dept urine culture protocol.    Lab Result & Date of Final Report [copied from Result Note]:    Final Urine Culture Report on 1/8/21  Emergency Dept/Urgent Care discharge antibiotic prescribed: Cephalexin (Keflex) 500 mg capsule, 1 capsule (500 mg) by mouth 2 times daily for 7 days.   Bacterias, 50,000 to 100,000 colonies/mL Escherichia coli  And 50,000 to 100,000 colonies/mL Strain 2   Escherichia coli  is SUSCEPTIBLE to Antibiotic.    As per [place] ED Lab Result protocol, no change in antibiotic therapy.    Current symptoms (include time patient called):    3:11PM: Spoke with patient. She states she is feeling the same, not worse, but not better.     Recommendations/Instructions:   Patient notified of lab result and treatment recommendation.   Take antibiotic as directed by the Emergency Dept/Urgent Care Provider.  Advised per ED lab urine culture protocol that since she is not feeling better she should contact her PCP within 24 hours or return to the ED if feeling worse.   The patient is comfortable with the information given and has no further questions.     Please contact you PCP or return to the Emergency Department if your:    Symptoms worsen or other concerning symptoms    Cecilia Maldonado RN  [employer] [facility] RN  Lung Nodule and ED Lab Result RN  Epic pool (ED late result f/u RN): [id number]  [facility] INCIDENTAL RADIOLOGY F/U NURSES: [id number]  # [phone]

## 2021-01-13 ENCOUNTER — OFFICE VISIT (OUTPATIENT)
Dept: INTERNAL MEDICINE | Facility: CLINIC | Age: 26
End: 2021-01-13
Payer: COMMERCIAL

## 2021-01-13 VITALS
DIASTOLIC BLOOD PRESSURE: 60 MMHG | HEIGHT: 67 IN | WEIGHT: 147 LBS | HEART RATE: 121 BPM | SYSTOLIC BLOOD PRESSURE: 102 MMHG | BODY MASS INDEX: 23.07 KG/M2 | RESPIRATION RATE: 14 BRPM | TEMPERATURE: 98.3 F | OXYGEN SATURATION: 100 %

## 2021-01-13 DIAGNOSIS — B96.20 E. COLI UTI (URINARY TRACT INFECTION): ICD-10-CM

## 2021-01-13 DIAGNOSIS — N93.9 VAGINAL BLEEDING: ICD-10-CM

## 2021-01-13 DIAGNOSIS — Z21 ASYMPTOMATIC HUMAN IMMUNODEFICIENCY VIRUS (HIV) INFECTION STATUS (H): Chronic | ICD-10-CM

## 2021-01-13 DIAGNOSIS — Z97.5 NEXPLANON IN PLACE: ICD-10-CM

## 2021-01-13 DIAGNOSIS — N39.0 E. COLI UTI (URINARY TRACT INFECTION): ICD-10-CM

## 2021-01-13 DIAGNOSIS — Z09 HOSPITAL DISCHARGE FOLLOW-UP: Primary | ICD-10-CM

## 2021-01-13 DIAGNOSIS — N83.201 CYST OF RIGHT OVARY: ICD-10-CM

## 2021-01-13 DIAGNOSIS — R10.30 LOWER ABDOMINAL PAIN: ICD-10-CM

## 2021-01-13 LAB
ALBUMIN UR-MCNC: 100 MG/DL
APPEARANCE UR: CLEAR
BACTERIA #/AREA URNS HPF: ABNORMAL /HPF
BASOPHILS # BLD AUTO: 0 10E9/L (ref 0–0.2)
BASOPHILS NFR BLD AUTO: 0.2 %
BILIRUB UR QL STRIP: ABNORMAL
COLOR UR AUTO: YELLOW
DIFFERENTIAL METHOD BLD: ABNORMAL
EOSINOPHIL # BLD AUTO: 0 10E9/L (ref 0–0.7)
EOSINOPHIL NFR BLD AUTO: 0.7 %
ERYTHROCYTE [DISTWIDTH] IN BLOOD BY AUTOMATED COUNT: 15.9 % (ref 10–15)
GLUCOSE UR STRIP-MCNC: NEGATIVE MG/DL
HCT VFR BLD AUTO: 35.5 % (ref 35–47)
HGB BLD-MCNC: 12.1 G/DL (ref 11.7–15.7)
HGB UR QL STRIP: ABNORMAL
KETONES UR STRIP-MCNC: 40 MG/DL
LEUKOCYTE ESTERASE UR QL STRIP: ABNORMAL
LYMPHOCYTES # BLD AUTO: 1.6 10E9/L (ref 0.8–5.3)
LYMPHOCYTES NFR BLD AUTO: 39.4 %
MCH RBC QN AUTO: 31.5 PG (ref 26.5–33)
MCHC RBC AUTO-ENTMCNC: 34.1 G/DL (ref 31.5–36.5)
MCV RBC AUTO: 92 FL (ref 78–100)
MONOCYTES # BLD AUTO: 0.4 10E9/L (ref 0–1.3)
MONOCYTES NFR BLD AUTO: 9 %
NEUTROPHILS # BLD AUTO: 2.1 10E9/L (ref 1.6–8.3)
NEUTROPHILS NFR BLD AUTO: 50.7 %
NITRATE UR QL: NEGATIVE
NON-SQ EPI CELLS #/AREA URNS LPF: ABNORMAL /LPF
PH UR STRIP: 7 PH (ref 5–7)
PLATELET # BLD AUTO: 168 10E9/L (ref 150–450)
RBC # BLD AUTO: 3.84 10E12/L (ref 3.8–5.2)
RBC #/AREA URNS AUTO: ABNORMAL /HPF
SOURCE: ABNORMAL
SP GR UR STRIP: 1.02 (ref 1–1.03)
UROBILINOGEN UR STRIP-ACNC: 0.2 EU/DL (ref 0.2–1)
WBC # BLD AUTO: 4.1 10E9/L (ref 4–11)
WBC #/AREA URNS AUTO: ABNORMAL /HPF

## 2021-01-13 PROCEDURE — 36415 COLL VENOUS BLD VENIPUNCTURE: CPT | Performed by: INTERNAL MEDICINE

## 2021-01-13 PROCEDURE — 81001 URINALYSIS AUTO W/SCOPE: CPT | Performed by: INTERNAL MEDICINE

## 2021-01-13 PROCEDURE — 99214 OFFICE O/P EST MOD 30 MIN: CPT | Performed by: INTERNAL MEDICINE

## 2021-01-13 PROCEDURE — 80050 GENERAL HEALTH PANEL: CPT | Performed by: INTERNAL MEDICINE

## 2021-01-13 RX ORDER — IBUPROFEN 600 MG/1
600 TABLET, FILM COATED ORAL EVERY 8 HOURS PRN
Qty: 60 TABLET | Refills: 0 | Status: SHIPPED | OUTPATIENT
Start: 2021-01-13 | End: 2022-04-29

## 2021-01-13 RX ORDER — NITROFURANTOIN 25; 75 MG/1; MG/1
100 CAPSULE ORAL 2 TIMES DAILY
Qty: 14 CAPSULE | Refills: 0 | Status: SHIPPED | OUTPATIENT
Start: 2021-01-13 | End: 2021-01-14 | Stop reason: SINTOL

## 2021-01-13 RX ORDER — ABACAVIR SULFATE, DOLUTEGRAVIR SODIUM, LAMIVUDINE 600; 50; 300 MG/1; MG/1; MG/1
1 TABLET, FILM COATED ORAL DAILY
COMMUNITY
Start: 2020-12-15

## 2021-01-13 ASSESSMENT — ENCOUNTER SYMPTOMS
FEVER: 0
CHILLS: 0
DYSURIA: 1
ABDOMINAL PAIN: 1

## 2021-01-13 ASSESSMENT — MIFFLIN-ST. JEOR: SCORE: 1444.42

## 2021-01-13 NOTE — PROGRESS NOTES
Assessment & Plan   Problem List Items Addressed This Visit        Infectious/Inflammatory    Asymptomatic human immunodeficiency virus (HIV) infection status (H) (Chronic)      Other Visit Diagnoses     Hospital discharge follow-up    -  Primary    E. coli UTI (urinary tract infection)        Relevant Medications    TRIUMEQ 600- MG per tablet    nitroFURantoin macrocrystal-monohydrate (MACROBID) 100 MG capsule    Other Relevant Orders    CT Abdomen Pelvis w Contrast    CBC with platelets and differential (Completed)    TSH with free T4 reflex (Completed)    UA with Microscopic reflex to Culture (Completed)    Nexplanon in place        Cyst of right ovary        Relevant Medications    ibuprofen (ADVIL/MOTRIN) 600 MG tablet    Other Relevant Orders    OB/GYN REFERRAL    CT Abdomen Pelvis w Contrast    CBC with platelets and differential (Completed)    Comprehensive metabolic panel (BMP + Alb, Alk Phos, ALT, AST, Total. Bili, TP) (Completed)    TSH with free T4 reflex (Completed)    Lower abdominal pain        Relevant Medications    ibuprofen (ADVIL/MOTRIN) 600 MG tablet    Other Relevant Orders    CT Abdomen Pelvis w Contrast    CBC with platelets and differential (Completed)    Comprehensive metabolic panel (BMP + Alb, Alk Phos, ALT, AST, Total. Bili, TP) (Completed)    TSH with free T4 reflex (Completed)    Vaginal bleeding             ER notes were reviewed.  With her ongoing urinary symptoms I will discontinue cephalexin and switch her to Macrobid.  Also her abdominal pain is not getting better even with ibuprofen.  On examination patient had significant tenderness.  Will order CAT scan of the abdomen pelvis due to ongoing pain.  Will increase ibuprofen to 600 mg up to 3 times a day.  We will also do labs again including CBC, CMP and TSH for her pain and vaginal bleeding.    Will place referral for gynecologist for lower abdominal pain, vaginal bleeding with ovarian cyst.  She can also discuss about  "removing Nexplanon and that visit.  Advised to go to emergency room if her symptoms are not getting better.    Yaima Daniel MD  [facility] SONIDO Francois is a 25 year old who presents to clinic today for the following health issues ED follow up    HPI   ED/UC Followup:    Facility:  [facility] ED  Date of visit: 1/05/2021  Reason for visit: vaginal bleeding  Current Status: Worsening     This is a 25-year-old female with history of HIV infection on medications went to ER on fifth with symptoms of  sudden onset of vaginal bleeding.  She is on Nexplanon for more than a year and never had any heavy menstrual   Cycle.  Also complained of burning sensation while urinating and her urine analysis was positive for infection.    Urine grew E. coli and patient was given cephalexin.  Patient is on day 6 of antibiotic and her symptoms are not   better.  Significant discomfort with burning sensation while urinating.  For the vaginal bleeding, ultrasound showed right ovarian cyst.  Hemoglobin was stable and patient he was   hemodynamically stable.  Currently patient has vaginal spotting.  But still has a lot of discomfort in the lower abdomen and takes ibuprofen   mg every 8 hours and still has pain as it wears off in few hours.    Patient also wanted Nexplanon removed and switched to Depo shot which she has used in the past.    US pelvic complete with transvaginal:  1. The right ovary contains a small probable collapsing cyst measuring 1.1 cm. 2. Trace amount of free fluid in the pelvis is nonspecific, and may be physiologic. Reading per radiology.      Review of Systems   Constitutional: Negative for chills and fever.   Gastrointestinal: Positive for abdominal pain.   Genitourinary: Positive for dysuria and menstrual problem.        Objective    /60   Pulse 121   Temp 98.3  F (36.8  C) (Oral)   Resp 14   Ht 1.702 m (5' 7\")   Wt 66.7 kg (147 lb)   LMP  (LMP Unknown)   " SpO2 100%   Breastfeeding No   BMI 23.02 kg/m    Body mass index is 23.02 kg/m .  Physical Exam  Vitals signs reviewed.   Constitutional:       Appearance: She is ill-appearing.   HENT:      Head: Normocephalic and atraumatic.   Abdominal:      Palpations: Abdomen is soft.      Comments: Patient had significant tenderness throughout the lower abdomen.  No mass palpable.  No rebound tenderness noted.   Skin:     General: Skin is warm.   Neurological:      Mental Status: She is alert.

## 2021-01-14 ENCOUNTER — TELEPHONE (OUTPATIENT)
Dept: INTERNAL MEDICINE | Facility: CLINIC | Age: 26
End: 2021-01-14

## 2021-01-14 ENCOUNTER — OFFICE VISIT (OUTPATIENT)
Dept: OBGYN | Facility: CLINIC | Age: 26
End: 2021-01-14
Payer: COMMERCIAL

## 2021-01-14 VITALS — SYSTOLIC BLOOD PRESSURE: 110 MMHG | WEIGHT: 147.2 LBS | BODY MASS INDEX: 23.05 KG/M2 | DIASTOLIC BLOOD PRESSURE: 70 MMHG

## 2021-01-14 DIAGNOSIS — N83.201 CYST OF RIGHT OVARY: ICD-10-CM

## 2021-01-14 DIAGNOSIS — Z30.013 ENCOUNTER FOR INITIAL PRESCRIPTION OF INJECTABLE CONTRACEPTIVE: ICD-10-CM

## 2021-01-14 DIAGNOSIS — Z30.46 ENCOUNTER FOR NEXPLANON REMOVAL: Primary | ICD-10-CM

## 2021-01-14 DIAGNOSIS — N39.0 E. COLI UTI (URINARY TRACT INFECTION): Primary | ICD-10-CM

## 2021-01-14 DIAGNOSIS — B96.20 E. COLI UTI (URINARY TRACT INFECTION): Primary | ICD-10-CM

## 2021-01-14 DIAGNOSIS — B96.20 E. COLI UTI (URINARY TRACT INFECTION): ICD-10-CM

## 2021-01-14 DIAGNOSIS — N39.0 E. COLI UTI (URINARY TRACT INFECTION): ICD-10-CM

## 2021-01-14 DIAGNOSIS — R10.30 LOWER ABDOMINAL PAIN: ICD-10-CM

## 2021-01-14 LAB
ALBUMIN SERPL-MCNC: NORMAL G/DL (ref 3.4–5)
ALP SERPL-CCNC: NORMAL U/L (ref 40–150)
ALT SERPL W P-5'-P-CCNC: NORMAL U/L (ref 0–50)
ANION GAP SERPL CALCULATED.3IONS-SCNC: NORMAL MMOL/L (ref 6–17)
AST SERPL W P-5'-P-CCNC: NORMAL U/L (ref 0–45)
BILIRUB SERPL-MCNC: NORMAL MG/DL (ref 0.2–1.3)
BUN SERPL-MCNC: NORMAL MG/DL (ref 7–30)
CALCIUM SERPL-MCNC: NORMAL MG/DL (ref 8.5–10.1)
CHLORIDE SERPL-SCNC: NORMAL MMOL/L (ref 94–109)
CO2 SERPL-SCNC: NORMAL MMOL/L (ref 20–32)
CREAT SERPL-MCNC: NORMAL MG/DL (ref 0.52–1.04)
GFR SERPL CREATININE-BSD FRML MDRD: NORMAL ML/MIN/{1.73_M2}
GLUCOSE SERPL-MCNC: NORMAL MG/DL (ref 70–99)
POTASSIUM SERPL-SCNC: NORMAL MMOL/L (ref 3.4–5.3)
PROT SERPL-MCNC: NORMAL G/DL (ref 6.8–8.8)
SODIUM SERPL-SCNC: NORMAL MMOL/L (ref 133–144)
TSH SERPL DL<=0.005 MIU/L-ACNC: NORMAL MU/L (ref 0.4–4)

## 2021-01-14 PROCEDURE — 11982 REMOVE DRUG IMPLANT DEVICE: CPT | Performed by: OBSTETRICS & GYNECOLOGY

## 2021-01-14 PROCEDURE — 80053 COMPREHEN METABOLIC PANEL: CPT | Performed by: INTERNAL MEDICINE

## 2021-01-14 PROCEDURE — 84443 ASSAY THYROID STIM HORMONE: CPT | Performed by: INTERNAL MEDICINE

## 2021-01-14 PROCEDURE — 36415 COLL VENOUS BLD VENIPUNCTURE: CPT | Performed by: INTERNAL MEDICINE

## 2021-01-14 PROCEDURE — 96372 THER/PROPH/DIAG INJ SC/IM: CPT | Performed by: OBSTETRICS & GYNECOLOGY

## 2021-01-14 RX ORDER — CEFDINIR 300 MG/1
300 CAPSULE ORAL 2 TIMES DAILY
Qty: 20 CAPSULE | Refills: 0 | Status: SHIPPED | OUTPATIENT
Start: 2021-01-14 | End: 2021-01-24

## 2021-01-14 RX ORDER — MEDROXYPROGESTERONE ACETATE 150 MG/ML
150 INJECTION, SUSPENSION INTRAMUSCULAR
Status: ACTIVE | OUTPATIENT
Start: 2021-01-14 | End: 2022-01-09

## 2021-01-14 RX ADMIN — MEDROXYPROGESTERONE ACETATE 150 MG: 150 INJECTION, SUSPENSION INTRAMUSCULAR at 16:27

## 2021-01-14 NOTE — TELEPHONE ENCOUNTER
Patient is calling because she thinks she is having a reaction to the nitrofurantoin that was prescribed for her yesterday. She is having itching and sweating.

## 2021-01-14 NOTE — PROGRESS NOTES
Contacted patient to inform her of the lab processing error and that she would need to be redrawn. She will come in today because she has an appt in OB at 3:30. Kristie

## 2021-01-15 ENCOUNTER — HEALTH MAINTENANCE LETTER (OUTPATIENT)
Age: 26
End: 2021-01-15

## 2021-01-15 LAB
ALBUMIN SERPL-MCNC: 3.6 G/DL (ref 3.4–5)
ALP SERPL-CCNC: 68 U/L (ref 40–150)
ALT SERPL W P-5'-P-CCNC: 21 U/L (ref 0–50)
ANION GAP SERPL CALCULATED.3IONS-SCNC: 6 MMOL/L (ref 3–14)
AST SERPL W P-5'-P-CCNC: 23 U/L (ref 0–45)
BILIRUB SERPL-MCNC: 1 MG/DL (ref 0.2–1.3)
BUN SERPL-MCNC: 4 MG/DL (ref 7–30)
CALCIUM SERPL-MCNC: 9.1 MG/DL (ref 8.5–10.1)
CHLORIDE SERPL-SCNC: 107 MMOL/L (ref 94–109)
CO2 SERPL-SCNC: 23 MMOL/L (ref 20–32)
CREAT SERPL-MCNC: 0.77 MG/DL (ref 0.52–1.04)
GFR SERPL CREATININE-BSD FRML MDRD: >90 ML/MIN/{1.73_M2}
GLUCOSE SERPL-MCNC: 99 MG/DL (ref 70–99)
POTASSIUM SERPL-SCNC: 3.5 MMOL/L (ref 3.4–5.3)
PROT SERPL-MCNC: 8.1 G/DL (ref 6.8–8.8)
SODIUM SERPL-SCNC: 136 MMOL/L (ref 133–144)
TSH SERPL DL<=0.005 MIU/L-ACNC: 0.84 MU/L (ref 0.4–4)

## 2021-01-19 ENCOUNTER — HOSPITAL ENCOUNTER (OUTPATIENT)
Dept: CT IMAGING | Facility: CLINIC | Age: 26
Discharge: HOME | End: 2021-01-19
Attending: INTERNAL MEDICINE | Admitting: INTERNAL MEDICINE
Payer: COMMERCIAL

## 2021-01-19 DIAGNOSIS — B96.20 E. COLI UTI (URINARY TRACT INFECTION): ICD-10-CM

## 2021-01-19 DIAGNOSIS — N39.0 E. COLI UTI (URINARY TRACT INFECTION): ICD-10-CM

## 2021-01-19 DIAGNOSIS — N83.201 CYST OF RIGHT OVARY: ICD-10-CM

## 2021-01-19 DIAGNOSIS — R10.30 LOWER ABDOMINAL PAIN: ICD-10-CM

## 2021-01-19 PROCEDURE — 250N000009 HC RX 250: Performed by: RADIOLOGY

## 2021-01-19 PROCEDURE — 250N000011 HC RX IP 250 OP 636: Performed by: RADIOLOGY

## 2021-01-19 PROCEDURE — 74177 CT ABD & PELVIS W/CONTRAST: CPT

## 2021-01-19 RX ORDER — IOPAMIDOL 755 MG/ML
500 INJECTION, SOLUTION INTRAVASCULAR ONCE
Status: COMPLETED | OUTPATIENT
Start: 2021-01-19 | End: 2021-01-19

## 2021-01-19 RX ADMIN — SODIUM CHLORIDE 49 ML: 9 INJECTION, SOLUTION INTRAVENOUS at 07:30

## 2021-01-19 RX ADMIN — IOPAMIDOL 74 ML: 755 INJECTION, SOLUTION INTRAVENOUS at 07:30

## 2021-01-28 ENCOUNTER — TELEPHONE (OUTPATIENT)
Dept: INTERNAL MEDICINE | Facility: CLINIC | Age: 26
End: 2021-01-28

## 2021-01-28 DIAGNOSIS — R30.0 DYSURIA: Primary | ICD-10-CM

## 2021-01-28 DIAGNOSIS — R30.0 DYSURIA: ICD-10-CM

## 2021-01-28 LAB
ALBUMIN UR-MCNC: NEGATIVE MG/DL
APPEARANCE UR: CLEAR
BACTERIA #/AREA URNS HPF: ABNORMAL /HPF
BILIRUB UR QL STRIP: NEGATIVE
COLOR UR AUTO: YELLOW
GLUCOSE UR STRIP-MCNC: NEGATIVE MG/DL
HGB UR QL STRIP: ABNORMAL
KETONES UR STRIP-MCNC: NEGATIVE MG/DL
LEUKOCYTE ESTERASE UR QL STRIP: ABNORMAL
NITRATE UR QL: NEGATIVE
PH UR STRIP: 5.5 PH (ref 5–7)
RBC #/AREA URNS AUTO: ABNORMAL /HPF
SOURCE: ABNORMAL
SP GR UR STRIP: <=1.005 (ref 1–1.03)
UROBILINOGEN UR STRIP-ACNC: 0.2 EU/DL (ref 0.2–1)
WBC #/AREA URNS AUTO: ABNORMAL /HPF

## 2021-01-28 PROCEDURE — 81001 URINALYSIS AUTO W/SCOPE: CPT | Performed by: INTERNAL MEDICINE

## 2021-01-28 NOTE — TELEPHONE ENCOUNTER
Patient is calling because she finished her antibiotic for her UTI on 1/25/21. As of yesterday she started having the same symptoms and would like another rx if possible.

## 2021-01-28 NOTE — TELEPHONE ENCOUNTER
Called pt for more information.  She started having burning with urination starting yesterday, this feels the same as when she originally was seen for a UTI.    I discussed this with Dr Weiss who saw the pt for the Nexplanon removal on 1/14.  He reviewed the CT and US noting the cyst.  These cysts are not related to her urinary symptoms and do not need follow up from us.    Chari Miranda RN

## 2021-01-28 NOTE — TELEPHONE ENCOUNTER
She did have ovarian cyst in pelvic usg.   Please advise follow-up with gynec.    Yaima Daniel MD

## 2021-02-11 ENCOUNTER — VIRTUAL VISIT (OUTPATIENT)
Dept: URGENT CARE | Facility: CLINIC | Age: 26
End: 2021-02-11
Payer: COMMERCIAL

## 2021-02-11 DIAGNOSIS — Z00.00 HEALTHCARE MAINTENANCE: Primary | ICD-10-CM

## 2021-02-11 PROCEDURE — 99212 OFFICE O/P EST SF 10 MIN: CPT | Mod: 95 | Performed by: PHYSICIAN ASSISTANT

## 2021-02-11 NOTE — PROGRESS NOTES
SUBJECTIVE:   Priscila Betancourt is a 25 year old female presenting with a chief complaint of No chief complaint on file.    She is established patient of [facility].  Patient requesting TB test for employment.  She denies having any symptoms.        Review of Systems   All other systems reviewed and are negative.      Past Medical History:   Diagnosis Date     Asthma     Hosp as child, no intubations     Asymptomatic human immunodeficiency virus (HIV) infection status (H) 6/21/2019     Family History   Problem Relation Age of Onset     Family History Negative No family hx of      Current Outpatient Medications   Medication Sig Dispense Refill     albuterol (2.5 MG/3ML) 0.083% nebulizer solution Take 3 mLs by nebulization every 6 hours as needed for shortness of breath / dyspnea. 24 mL 3     albuterol (PROAIR HFA/PROVENTIL HFA/VENTOLIN HFA) 108 (90 Base) MCG/ACT inhaler Inhale 2 puffs into the lungs 4 times daily as needed for shortness of breath / dyspnea 1 Inhaler 0     fluticasone-salmeterol (ADVAIR DISKUS) 250-50 MCG/DOSE inhaler Inhale 1 puff into the lungs 2 times daily 1 Inhaler 5     ibuprofen (ADVIL/MOTRIN) 600 MG tablet Take 1 tablet (600 mg) by mouth every 8 hours as needed for fever or pain 60 tablet 0     TRIUMEQ 600- MG per tablet Take 1 tablet by mouth daily       Social History     Tobacco Use     Smoking status: Never Smoker     Smokeless tobacco: Never Used   Substance Use Topics     Alcohol use: No     Health maintenance.  She will go tomorrow to get blood draw.    Spent 3:41-3:46 on phone with patient.

## 2021-05-13 ENCOUNTER — OFFICE VISIT (OUTPATIENT)
Dept: FAMILY MEDICINE | Facility: CLINIC | Age: 26
End: 2021-05-13
Payer: COMMERCIAL

## 2021-05-13 VITALS
DIASTOLIC BLOOD PRESSURE: 74 MMHG | RESPIRATION RATE: 16 BRPM | WEIGHT: 151 LBS | HEIGHT: 67 IN | OXYGEN SATURATION: 96 % | SYSTOLIC BLOOD PRESSURE: 122 MMHG | TEMPERATURE: 98.2 F | HEART RATE: 109 BPM | BODY MASS INDEX: 23.7 KG/M2

## 2021-05-13 DIAGNOSIS — J44.89 CHRONIC OBSTRUCTIVE AIRWAY DISEASE WITH ASTHMA (H): ICD-10-CM

## 2021-05-13 DIAGNOSIS — Z00.00 ROUTINE GENERAL MEDICAL EXAMINATION AT A HEALTH CARE FACILITY: Primary | ICD-10-CM

## 2021-05-13 DIAGNOSIS — F43.21 GRIEVING: ICD-10-CM

## 2021-05-13 DIAGNOSIS — F51.02 ADJUSTMENT INSOMNIA: ICD-10-CM

## 2021-05-13 DIAGNOSIS — K59.00 CONSTIPATION, UNSPECIFIED CONSTIPATION TYPE: ICD-10-CM

## 2021-05-13 DIAGNOSIS — Z11.3 SCREEN FOR STD (SEXUALLY TRANSMITTED DISEASE): ICD-10-CM

## 2021-05-13 DIAGNOSIS — F43.25 ADJUSTMENT DISORDER WITH MIXED DISTURBANCE OF EMOTIONS AND CONDUCT: ICD-10-CM

## 2021-05-13 PROBLEM — Z36.89 ENCOUNTER FOR TRIAGE IN PREGNANT PATIENT: Status: RESOLVED | Noted: 2019-10-25 | Resolved: 2021-05-13

## 2021-05-13 LAB
ALBUMIN SERPL-MCNC: 3.6 G/DL (ref 3.4–5)
ALP SERPL-CCNC: 56 U/L (ref 40–150)
ALT SERPL W P-5'-P-CCNC: 119 U/L (ref 0–50)
ANION GAP SERPL CALCULATED.3IONS-SCNC: 4 MMOL/L (ref 3–14)
AST SERPL W P-5'-P-CCNC: 171 U/L (ref 0–45)
BASOPHILS # BLD AUTO: 0 10E9/L (ref 0–0.2)
BASOPHILS NFR BLD AUTO: 0.3 %
BILIRUB SERPL-MCNC: 0.4 MG/DL (ref 0.2–1.3)
BUN SERPL-MCNC: 6 MG/DL (ref 7–30)
CALCIUM SERPL-MCNC: 8.9 MG/DL (ref 8.5–10.1)
CHLORIDE SERPL-SCNC: 107 MMOL/L (ref 94–109)
CHOLEST SERPL-MCNC: 263 MG/DL
CO2 SERPL-SCNC: 28 MMOL/L (ref 20–32)
CREAT SERPL-MCNC: 0.71 MG/DL (ref 0.52–1.04)
DIFFERENTIAL METHOD BLD: ABNORMAL
EOSINOPHIL # BLD AUTO: 0.1 10E9/L (ref 0–0.7)
EOSINOPHIL NFR BLD AUTO: 1.6 %
ERYTHROCYTE [DISTWIDTH] IN BLOOD BY AUTOMATED COUNT: 15.1 % (ref 10–15)
GFR SERPL CREATININE-BSD FRML MDRD: >90 ML/MIN/{1.73_M2}
GLUCOSE SERPL-MCNC: 74 MG/DL (ref 70–99)
HCT VFR BLD AUTO: 35.9 % (ref 35–47)
HCV AB SERPL QL IA: NONREACTIVE
HDLC SERPL-MCNC: 152 MG/DL
HGB BLD-MCNC: 12.4 G/DL (ref 11.7–15.7)
LDLC SERPL CALC-MCNC: 98 MG/DL
LYMPHOCYTES # BLD AUTO: 1.8 10E9/L (ref 0.8–5.3)
LYMPHOCYTES NFR BLD AUTO: 48.4 %
MCH RBC QN AUTO: 32.5 PG (ref 26.5–33)
MCHC RBC AUTO-ENTMCNC: 34.5 G/DL (ref 31.5–36.5)
MCV RBC AUTO: 94 FL (ref 78–100)
MONOCYTES # BLD AUTO: 0.6 10E9/L (ref 0–1.3)
MONOCYTES NFR BLD AUTO: 15.9 %
NEUTROPHILS # BLD AUTO: 1.2 10E9/L (ref 1.6–8.3)
NEUTROPHILS NFR BLD AUTO: 33.8 %
NONHDLC SERPL-MCNC: 111 MG/DL
PLATELET # BLD AUTO: 162 10E9/L (ref 150–450)
POTASSIUM SERPL-SCNC: 3.7 MMOL/L (ref 3.4–5.3)
PROT SERPL-MCNC: 8.3 G/DL (ref 6.8–8.8)
RBC # BLD AUTO: 3.81 10E12/L (ref 3.8–5.2)
SODIUM SERPL-SCNC: 139 MMOL/L (ref 133–144)
T PALLIDUM AB SER QL: NONREACTIVE
TRIGL SERPL-MCNC: 66 MG/DL
WBC # BLD AUTO: 3.6 10E9/L (ref 4–11)

## 2021-05-13 PROCEDURE — 86803 HEPATITIS C AB TEST: CPT | Performed by: FAMILY MEDICINE

## 2021-05-13 PROCEDURE — 36415 COLL VENOUS BLD VENIPUNCTURE: CPT | Performed by: FAMILY MEDICINE

## 2021-05-13 PROCEDURE — 86780 TREPONEMA PALLIDUM: CPT | Mod: 90 | Performed by: FAMILY MEDICINE

## 2021-05-13 PROCEDURE — 87591 N.GONORRHOEAE DNA AMP PROB: CPT | Performed by: FAMILY MEDICINE

## 2021-05-13 PROCEDURE — 87491 CHLMYD TRACH DNA AMP PROBE: CPT | Performed by: FAMILY MEDICINE

## 2021-05-13 PROCEDURE — 80061 LIPID PANEL: CPT | Performed by: FAMILY MEDICINE

## 2021-05-13 PROCEDURE — 99395 PREV VISIT EST AGE 18-39: CPT | Performed by: FAMILY MEDICINE

## 2021-05-13 PROCEDURE — 85025 COMPLETE CBC W/AUTO DIFF WBC: CPT | Performed by: FAMILY MEDICINE

## 2021-05-13 PROCEDURE — 99214 OFFICE O/P EST MOD 30 MIN: CPT | Mod: 25 | Performed by: FAMILY MEDICINE

## 2021-05-13 PROCEDURE — 80053 COMPREHEN METABOLIC PANEL: CPT | Performed by: FAMILY MEDICINE

## 2021-05-13 PROCEDURE — 99000 SPECIMEN HANDLING OFFICE-LAB: CPT | Performed by: FAMILY MEDICINE

## 2021-05-13 RX ORDER — NEBULIZER AND COMPRESSOR
EACH MISCELLANEOUS
COMMUNITY
Start: 2020-07-12 | End: 2022-11-14

## 2021-05-13 RX ORDER — FLUTICASONE PROPIONATE 50 MCG
SPRAY, SUSPENSION (ML) NASAL
COMMUNITY
Start: 2020-07-28 | End: 2023-03-17

## 2021-05-13 RX ORDER — MIRTAZAPINE 7.5 MG/1
7.5 TABLET, FILM COATED ORAL AT BEDTIME
Qty: 30 TABLET | Refills: 0 | Status: SHIPPED | OUTPATIENT
Start: 2021-05-13 | End: 2021-06-10

## 2021-05-13 RX ORDER — ALBUTEROL SULFATE 90 UG/1
2 AEROSOL, METERED RESPIRATORY (INHALATION) 4 TIMES DAILY PRN
Qty: 18 G | Refills: 2 | Status: SHIPPED | OUTPATIENT
Start: 2021-05-13 | End: 2022-07-05

## 2021-05-13 RX ORDER — ALBUTEROL SULFATE 0.83 MG/ML
2.5 SOLUTION RESPIRATORY (INHALATION) EVERY 6 HOURS PRN
Qty: 24 ML | Refills: 3 | Status: SHIPPED | OUTPATIENT
Start: 2021-05-13 | End: 2022-11-14

## 2021-05-13 RX ORDER — POLYETHYLENE GLYCOL 3350 17 G/17G
1 POWDER, FOR SOLUTION ORAL DAILY
Qty: 578 G | Refills: 1 | Status: SHIPPED | OUTPATIENT
Start: 2021-05-13 | End: 2022-04-29

## 2021-05-13 RX ORDER — BUDESONIDE AND FORMOTEROL FUMARATE DIHYDRATE 160; 4.5 UG/1; UG/1
AEROSOL RESPIRATORY (INHALATION)
COMMUNITY
Start: 2021-05-03 | End: 2023-03-17

## 2021-05-13 RX ORDER — TIOTROPIUM BROMIDE INHALATION SPRAY 3.12 UG/1
SPRAY, METERED RESPIRATORY (INHALATION)
COMMUNITY
Start: 2020-08-21 | End: 2022-04-29

## 2021-05-13 ASSESSMENT — ENCOUNTER SYMPTOMS
NERVOUS/ANXIOUS: 1
NEUROLOGICAL NEGATIVE: 1
CONSTIPATION: 1
PALPITATIONS: 1
ENDOCRINE NEGATIVE: 1
EYE PAIN: 1
ABDOMINAL PAIN: 1
MUSCULOSKELETAL NEGATIVE: 1
CHILLS: 1

## 2021-05-13 ASSESSMENT — ANXIETY QUESTIONNAIRES
1. FEELING NERVOUS, ANXIOUS, OR ON EDGE: NEARLY EVERY DAY
5. BEING SO RESTLESS THAT IT IS HARD TO SIT STILL: MORE THAN HALF THE DAYS
GAD7 TOTAL SCORE: 20
7. FEELING AFRAID AS IF SOMETHING AWFUL MIGHT HAPPEN: NEARLY EVERY DAY
3. WORRYING TOO MUCH ABOUT DIFFERENT THINGS: NEARLY EVERY DAY
4. TROUBLE RELAXING: NEARLY EVERY DAY
GAD7 TOTAL SCORE: 20
6. BECOMING EASILY ANNOYED OR IRRITABLE: NEARLY EVERY DAY
2. NOT BEING ABLE TO STOP OR CONTROL WORRYING: NEARLY EVERY DAY
7. FEELING AFRAID AS IF SOMETHING AWFUL MIGHT HAPPEN: NEARLY EVERY DAY
GAD7 TOTAL SCORE: 20

## 2021-05-13 ASSESSMENT — MIFFLIN-ST. JEOR: SCORE: 1457.56

## 2021-05-13 ASSESSMENT — PATIENT HEALTH QUESTIONNAIRE - PHQ9
SUM OF ALL RESPONSES TO PHQ QUESTIONS 1-9: 25
SUM OF ALL RESPONSES TO PHQ QUESTIONS 1-9: 25
10. IF YOU CHECKED OFF ANY PROBLEMS, HOW DIFFICULT HAVE THESE PROBLEMS MADE IT FOR YOU TO DO YOUR WORK, TAKE CARE OF THINGS AT HOME, OR GET ALONG WITH OTHER PEOPLE: EXTREMELY DIFFICULT

## 2021-05-13 NOTE — PROGRESS NOTES
"   SUBJECTIVE:   CC: Priscila Betancourt is an 26 year old woman who presents for preventive health visit.       Patient has been advised of split billing requirements and indicates understanding: Yes  Healthy Habits:     Getting at least 3 servings of Calcium per day:  Yes    Bi-annual eye exam:  NO    Dental care twice a year:  Yes    Sleep apnea or symptoms of sleep apnea:  Daytime drowsiness and Sleep apnea    Diet:  Regular (no restrictions)    Frequency of exercise:  None    Taking medications regularly:  Yes    Medication side effects:  None    PHQ-2 Total Score: 6    Additional concerns today:  Yes    Grieving- brother just passed away and she is having a hard time dealing with this. Notes worsening mood.   Also reports her family wants her to gain weight because they feel she is \"skin and bones\".   A couple of years ago she weight 197 lbs.     Also reports issues with constipation for many years. Even had a colonoscopy.     Wt Readings from Last 4 Encounters:   05/13/21 68.5 kg (151 lb)   01/14/21 66.8 kg (147 lb 3.2 oz)   01/13/21 66.7 kg (147 lb)   12/10/19 89.4 kg (197 lb 3.2 oz)       Today's PHQ-2 Score:   PHQ-2 ( 1999 [employer]) 5/13/2021   Q1: Little interest or pleasure in doing things 3   Q2: Feeling down, depressed or hopeless 3   PHQ-2 Score 6   Q1: Little interest or pleasure in doing things Nearly every day   Q2: Feeling down, depressed or hopeless Nearly every day   PHQ-2 Score 6       Abuse: Current or Past (Physical, Sexual or Emotional) - No  Do you feel safe in your environment? Yes    Have you ever done Advance Care Planning? (For example, a Health Directive, POLST, or a discussion with a medical provider or your loved ones about your wishes): No, advance care planning information given to patient to review.  Patient plans to discuss their wishes with loved ones or provider.      Social History     Tobacco Use     Smoking status: Never Smoker     Smokeless tobacco: Never Used   Substance Use " "Topics     Alcohol use: No     If you drink alcohol do you typically have >3 drinks per day or >7 drinks per week? No    Alcohol Use 5/13/2021   Prescreen: >3 drinks/day or >7 drinks/week? No   Prescreen: >3 drinks/day or >7 drinks/week? -   No flowsheet data found.    Reviewed orders with patient.  Reviewed health maintenance and updated orders accordingly - Yes  Labs reviewed in EPIC    Breast Cancer Screening:    Breast CA Risk Assessment (FHS-7) 5/13/2021   Do you have a family history of breast, colon, or ovarian cancer? No / Unknown       click delete button to remove this line now  Patient under 40 years of age: Routine Mammogram Screening not recommended.   Pertinent mammograms are reviewed under the imaging tab.    History of abnormal Pap smear: NO - age 21-29 PAP every 3 years recommended  PAP / HPV 9/13/2010   PAP NIL     Reviewed and updated as needed this visit by clinical staff  Tobacco  Allergies  Meds  Problems    Fam Hx  Soc Hx        Reviewed and updated as needed this visit by Provider    Meds  Problems                Review of Systems   Constitutional: Positive for chills.   HENT: Negative.    Eyes: Positive for pain.   Cardiovascular: Positive for chest pain and palpitations.   Gastrointestinal: Positive for abdominal pain and constipation.   Endocrine: Negative.    Genitourinary: Negative.    Musculoskeletal: Negative.    Allergic/Immunologic: Positive for immunocompromised state.   Neurological: Negative.    Psychiatric/Behavioral: The patient is nervous/anxious.           OBJECTIVE:   /74 (BP Location: Right arm, Patient Position: Chair, Cuff Size: Adult Regular)   Pulse 109   Temp 98.2  F (36.8  C) (Oral)   Resp 16   Ht 1.702 m (5' 7\")   Wt 68.5 kg (151 lb)   LMP 04/26/2021 (Approximate)   SpO2 96%   Breastfeeding No   BMI 23.65 kg/m    Physical Exam  GENERAL: healthy, alert and no distress  EYES: Eyes grossly normal to inspection, PERRL and conjunctivae and sclerae " normal  HENT: ear canals and TM's normal, nose and mouth without ulcers or lesions  NECK: no adenopathy, no asymmetry, masses, or scars and thyroid normal to palpation  RESP: lungs clear to auscultation - no rales, rhonchi or wheezes  BREAST: normal without masses, tenderness or nipple discharge and no palpable axillary masses or adenopathy  CV: regular rate and rhythm, normal S1 S2, no S3 or S4, no murmur, click or rub, no peripheral edema and peripheral pulses strong  ABDOMEN: soft, nontender, no hepatosplenomegaly, no masses and bowel sounds normal  MS: no gross musculoskeletal defects noted, no edema  SKIN: no suspicious lesions or rashes  NEURO: Normal strength and tone, mentation intact and speech normal  PSYCH: mentation appears normal, affect normal/bright    Diagnostic Test Results:  Labs reviewed in Epic  none     ASSESSMENT/PLAN:   1. Routine general medical examination at a health care facility  - Lipid panel reflex to direct LDL Fasting  - CBC with platelets and differential  - Comprehensive metabolic panel (BMP + Alb, Alk Phos, ALT, AST, Total. Bili, TP)    2. Chronic obstructive airway disease with asthma (H)  - stable   - albuterol (PROVENTIL) (2.5 MG/3ML) 0.083% neb solution; Take 1 vial (2.5 mg) by nebulization every 6 hours as needed for shortness of breath / dyspnea  Dispense: 24 mL; Refill: 3  - Nebulizer and Supplies Order for DME - ONLY FOR DME  - albuterol (PROAIR HFA/PROVENTIL HFA/VENTOLIN HFA) 108 (90 Base) MCG/ACT inhaler; Inhale 2 puffs into the lungs 4 times daily as needed for shortness of breath / dyspnea  Dispense: 18 g; Refill: 2    3. Adjustment disorder with mixed disturbance of emotions and conduct  - will refer for therapy. Will also start on mirtazapine which has been helpful in the past.   - MENTAL HEALTH REFERRAL  - Adult; Outpatient Treatment; Individual/Couples/Family/Group Therapy/Health Psychology; [facility] [phone]; We will contact you to  "schedule the appointment or please call with any questions  - mirtazapine (REMERON) 7.5 MG tablet; Take 1 tablet (7.5 mg) by mouth At Bedtime  Dispense: 30 tablet; Refill: 0    4. Grieving  - MENTAL HEALTH REFERRAL  - Adult; Outpatient Treatment; Individual/Couples/Family/Group Therapy/Health Psychology; G: [facility] [phone]; We will contact you to schedule the appointment or please call with any questions  - mirtazapine (REMERON) 7.5 MG tablet; Take 1 tablet (7.5 mg) by mouth At Bedtime  Dispense: 30 tablet; Refill: 0    5. Screen for STD (sexually transmitted disease)  - NEISSERIA GONORRHOEA PCR  - CHLAMYDIA TRACHOMATIS PCR  - Treponema Abs w Reflex to RPR and Titer  - Hepatitis C Screen Reflex to HCV RNA Quant and Genotype    6. Adjustment insomnia  - mirtazapine (REMERON) 7.5 MG tablet; Take 1 tablet (7.5 mg) by mouth At Bedtime  Dispense: 30 tablet; Refill: 0    7. Constipation, unspecified constipation type  - recommend miralax daily.   - polyethylene glycol (MIRALAX) 17 GM/Dose powder; Take 17 g (1 capful) by mouth daily  Dispense: 578 g; Refill: 1    Patient has been advised of split billing requirements and indicates understanding: Yes  COUNSELING:  Reviewed preventive health counseling, as reflected in patient instructions       Regular exercise       Healthy diet/nutrition       Contraception       Safe sex practices/STD prevention    Estimated body mass index is 23.65 kg/m  as calculated from the following:    Height as of this encounter: 1.702 m (5' 7\").    Weight as of this encounter: 68.5 kg (151 lb).        She reports that she has never smoked. She has never used smokeless tobacco.      Counseling Resources:  ATP IV Guidelines  Pooled Cohorts Equation Calculator  Breast Cancer Risk Calculator  BRCA-Related Cancer Risk Assessment: FHS-7 Tool  FRAX Risk Assessment  ICSI Preventive Guidelines  Dietary Guidelines for Americans, 2010  USDA's MyPlate  ASA Prophylaxis  Lung CA " Screening    Sally Garibay MD  [facility]  Answers for HPI/ROS submitted by the patient on 5/13/2021   Annual Exam:  If you checked off any problems, how difficult have these problems made it for you to do your work, take care of things at home, or get along with other people?: Extremely difficult  PHQ9 TOTAL SCORE: 25  MILAGRO 7 TOTAL SCORE: 20

## 2021-05-13 NOTE — LETTER
May 13, 2021      Priscila Betancourt  [address] [address] [address]        To Whom It May Concern,      Priscila seen today and is in good health. Her weight is appropriate for her height and I do not recommend she gain any additional weight. Her previous weight of the 190's actually placed her in the overweight category.   She will continue to eat well and stay healthy.   There is no medical evidence for her to gain weight.     For further questions or concerns please let us know.             Sincerely,          Dr. Sally Kimball MD, [facility]

## 2021-05-14 ASSESSMENT — PATIENT HEALTH QUESTIONNAIRE - PHQ9: SUM OF ALL RESPONSES TO PHQ QUESTIONS 1-9: 25

## 2021-05-14 ASSESSMENT — ANXIETY QUESTIONNAIRES: GAD7 TOTAL SCORE: 20

## 2021-05-20 ENCOUNTER — HOSPITAL ENCOUNTER (EMERGENCY)
Facility: CLINIC | Age: 26
Discharge: HOME | End: 2021-05-20
Attending: EMERGENCY MEDICINE | Admitting: EMERGENCY MEDICINE
Payer: COMMERCIAL

## 2021-05-20 ENCOUNTER — APPOINTMENT (OUTPATIENT)
Dept: CT IMAGING | Facility: CLINIC | Age: 26
End: 2021-05-20
Attending: EMERGENCY MEDICINE
Payer: COMMERCIAL

## 2021-05-20 VITALS
SYSTOLIC BLOOD PRESSURE: 105 MMHG | OXYGEN SATURATION: 100 % | DIASTOLIC BLOOD PRESSURE: 65 MMHG | HEART RATE: 92 BPM | TEMPERATURE: 98.5 F | RESPIRATION RATE: 16 BRPM

## 2021-05-20 DIAGNOSIS — K59.09 OTHER CONSTIPATION: ICD-10-CM

## 2021-05-20 DIAGNOSIS — R10.84 ABDOMINAL PAIN, GENERALIZED: ICD-10-CM

## 2021-05-20 LAB
ALBUMIN SERPL-MCNC: 3.1 G/DL (ref 3.4–5)
ALBUMIN UR-MCNC: NEGATIVE MG/DL
ALP SERPL-CCNC: 76 U/L (ref 40–150)
ALT SERPL W P-5'-P-CCNC: 52 U/L (ref 0–50)
ANION GAP SERPL CALCULATED.3IONS-SCNC: 8 MMOL/L (ref 3–14)
APPEARANCE UR: CLEAR
AST SERPL W P-5'-P-CCNC: 154 U/L (ref 0–45)
BACTERIA #/AREA URNS HPF: ABNORMAL /HPF
BASOPHILS # BLD AUTO: 0 10E9/L (ref 0–0.2)
BASOPHILS NFR BLD AUTO: 0.3 %
BILIRUB SERPL-MCNC: 0.6 MG/DL (ref 0.2–1.3)
BILIRUB UR QL STRIP: NEGATIVE
BUN SERPL-MCNC: 6 MG/DL (ref 7–30)
CALCIUM SERPL-MCNC: 8.6 MG/DL (ref 8.5–10.1)
CHLORIDE SERPL-SCNC: 104 MMOL/L (ref 94–109)
CO2 SERPL-SCNC: 24 MMOL/L (ref 20–32)
COLOR UR AUTO: ABNORMAL
CREAT SERPL-MCNC: 0.74 MG/DL (ref 0.52–1.04)
DIFFERENTIAL METHOD BLD: ABNORMAL
EOSINOPHIL # BLD AUTO: 0.1 10E9/L (ref 0–0.7)
EOSINOPHIL NFR BLD AUTO: 0.9 %
ERYTHROCYTE [DISTWIDTH] IN BLOOD BY AUTOMATED COUNT: 14.7 % (ref 10–15)
GFR SERPL CREATININE-BSD FRML MDRD: >90 ML/MIN/{1.73_M2}
GLUCOSE SERPL-MCNC: 141 MG/DL (ref 70–99)
GLUCOSE UR STRIP-MCNC: NEGATIVE MG/DL
HCG SERPL QL: NEGATIVE
HCT VFR BLD AUTO: 33.3 % (ref 35–47)
HGB BLD-MCNC: 11.2 G/DL (ref 11.7–15.7)
HGB UR QL STRIP: ABNORMAL
IMM GRANULOCYTES # BLD: 0 10E9/L (ref 0–0.4)
IMM GRANULOCYTES NFR BLD: 0.2 %
KETONES UR STRIP-MCNC: NEGATIVE MG/DL
LEUKOCYTE ESTERASE UR QL STRIP: ABNORMAL
LIPASE SERPL-CCNC: 213 U/L (ref 73–393)
LYMPHOCYTES # BLD AUTO: 1.5 10E9/L (ref 0.8–5.3)
LYMPHOCYTES NFR BLD AUTO: 26 %
MCH RBC QN AUTO: 31.7 PG (ref 26.5–33)
MCHC RBC AUTO-ENTMCNC: 33.6 G/DL (ref 31.5–36.5)
MCV RBC AUTO: 94 FL (ref 78–100)
MONOCYTES # BLD AUTO: 0.3 10E9/L (ref 0–1.3)
MONOCYTES NFR BLD AUTO: 4.5 %
NEUTROPHILS # BLD AUTO: 4 10E9/L (ref 1.6–8.3)
NEUTROPHILS NFR BLD AUTO: 68.1 %
NITRATE UR QL: NEGATIVE
NRBC # BLD AUTO: 0 10*3/UL
NRBC BLD AUTO-RTO: 0 /100
PH UR STRIP: 7 PH (ref 5–7)
PLATELET # BLD AUTO: 232 10E9/L (ref 150–450)
POTASSIUM SERPL-SCNC: 3 MMOL/L (ref 3.4–5.3)
PROT SERPL-MCNC: 7.5 G/DL (ref 6.8–8.8)
RBC # BLD AUTO: 3.53 10E12/L (ref 3.8–5.2)
RBC #/AREA URNS AUTO: 1 /HPF (ref 0–2)
SODIUM SERPL-SCNC: 136 MMOL/L (ref 133–144)
SOURCE: ABNORMAL
SP GR UR STRIP: 1.01 (ref 1–1.03)
SQUAMOUS #/AREA URNS AUTO: 7 /HPF (ref 0–1)
UROBILINOGEN UR STRIP-MCNC: NORMAL MG/DL (ref 0–2)
WBC # BLD AUTO: 5.8 10E9/L (ref 4–11)
WBC #/AREA URNS AUTO: 18 /HPF (ref 0–5)

## 2021-05-20 PROCEDURE — 99285 EMERGENCY DEPT VISIT HI MDM: CPT | Mod: 25

## 2021-05-20 PROCEDURE — 74177 CT ABD & PELVIS W/CONTRAST: CPT

## 2021-05-20 PROCEDURE — 80053 COMPREHEN METABOLIC PANEL: CPT | Performed by: EMERGENCY MEDICINE

## 2021-05-20 PROCEDURE — 83690 ASSAY OF LIPASE: CPT | Performed by: EMERGENCY MEDICINE

## 2021-05-20 PROCEDURE — 87086 URINE CULTURE/COLONY COUNT: CPT | Performed by: EMERGENCY MEDICINE

## 2021-05-20 PROCEDURE — 36415 COLL VENOUS BLD VENIPUNCTURE: CPT

## 2021-05-20 PROCEDURE — 250N000009 HC RX 250: Performed by: EMERGENCY MEDICINE

## 2021-05-20 PROCEDURE — 87088 URINE BACTERIA CULTURE: CPT | Performed by: EMERGENCY MEDICINE

## 2021-05-20 PROCEDURE — 81001 URINALYSIS AUTO W/SCOPE: CPT | Performed by: EMERGENCY MEDICINE

## 2021-05-20 PROCEDURE — 85025 COMPLETE CBC W/AUTO DIFF WBC: CPT | Performed by: EMERGENCY MEDICINE

## 2021-05-20 PROCEDURE — 250N000011 HC RX IP 250 OP 636: Performed by: EMERGENCY MEDICINE

## 2021-05-20 PROCEDURE — 87186 SC STD MICRODIL/AGAR DIL: CPT | Performed by: EMERGENCY MEDICINE

## 2021-05-20 PROCEDURE — 84703 CHORIONIC GONADOTROPIN ASSAY: CPT | Performed by: EMERGENCY MEDICINE

## 2021-05-20 RX ORDER — POLYETHYLENE GLYCOL 3350 17 G/17G
1 POWDER, FOR SOLUTION ORAL DAILY
Qty: 527 G | Refills: 0 | Status: SHIPPED | OUTPATIENT
Start: 2021-05-20 | End: 2021-06-19

## 2021-05-20 RX ORDER — IOPAMIDOL 755 MG/ML
500 INJECTION, SOLUTION INTRAVASCULAR ONCE
Status: COMPLETED | OUTPATIENT
Start: 2021-05-20 | End: 2021-05-20

## 2021-05-20 RX ADMIN — SODIUM CHLORIDE 59 ML: 9 INJECTION, SOLUTION INTRAVENOUS at 07:13

## 2021-05-20 RX ADMIN — IOPAMIDOL 75 ML: 755 INJECTION, SOLUTION INTRAVENOUS at 07:13

## 2021-05-20 ASSESSMENT — ENCOUNTER SYMPTOMS
CONSTIPATION: 0
ABDOMINAL PAIN: 1
VOMITING: 1
DIARRHEA: 0

## 2021-05-20 NOTE — ED TRIAGE NOTES
Pt aox4, ABCs intact. Sudden onset of abd pain, 10/10 x30 minutes. Pt c/o epigastric pain that radiates to the right flank. 100 mcg fent given via EMS.

## 2021-05-20 NOTE — ED PROVIDER NOTES
History   Chief Complaint:  Abdominal Pain     The history is provided by the patient.      Priscila Betancourt is a 26 year old female with history of asthma and HIV who presents via EMS for abdominal pain. The patient reports waking up to shooting pain radiating up and down her right side. She also reports 5/10 pain in the center of her abdomen with associated vomiting x4. She also mentions pleuritic pain and difficulty standing straight. Denies diarrhea or constipation. LMP was about 3 weeks ago and normal for her. She had a  section in 2019.     Review of Systems   Gastrointestinal: Positive for abdominal pain and vomiting. Negative for constipation and diarrhea.   All other systems reviewed and are negative.    Allergies:  The patient has no known allergies.     Medications:  Triumeq  Albuterol  Advair  Remeron  Flonase  Symbicort  Spiriva Respimat  Depo-provera    Past Medical History:    Asthma  HIV infection  Chronic obstructive airway disease with asthma  STD    Past Surgical History:     section  Colonoscopy    Family History:    Asthma    Social History:  Presents to the ED alone.  Has a daughter at home.     Physical Exam     Patient Vitals for the past 24 hrs:   BP Temp Pulse Resp SpO2   21 0645 105/65 -- 92 -- 100 %   21 0630 101/63 -- 94 -- 100 %   21 0615 108/65 -- 91 -- 100 %   21 0600 106/63 -- 84 -- 100 %   21 0545 -- -- -- -- 100 %   21 0530 120/78 -- 86 -- --   21 0515 116/76 -- -- -- --   21 0450 122/72 98.5  F (36.9  C) 87 16 97 %       Physical Exam  Vitals signs reviewed.   HENT:      Head: Normocephalic.      Mouth/Throat:      Mouth: Mucous membranes are moist.   Eyes:      General: No scleral icterus.  Cardiovascular:      Rate and Rhythm: Normal rate.   Abdominal:      General: Abdomen is flat.      Palpations: Abdomen is soft.      Tenderness: There is abdominal tenderness in the right upper quadrant, right lower quadrant,  periumbilical area and suprapubic area.   Skin:     General: Skin is warm.      Capillary Refill: Capillary refill takes less than 2 seconds.   Neurological:      General: No focal deficit present.      Mental Status: She is alert.         Emergency Department Course   Imaging:  CT Abdomen/Pelvis with IV contrast:   1.  Moderate to large amount of stool throughout the colon may  indicate a degree of constipation. Otherwise normal CT of the abdomen  and pelvis. As per radiology.    Laboratory:   CBC: WBC 5.8, HGB 11.2 (L),     CMP: Glucose: 141 (H), Potassium: 3.0 (L), Urea: 6 (L), Albumin: 3.1 (L), ALT: 52 (H), AST: 154 (H) o/w WNL (Creatinine 0.74)     Lipase: 213    HCG Qualitative: Negative    UA with Microscopic: Blood Moderate(A), Leukocyte Esterase Moderate(A), WBC/HPF 18(H), Bacteria Few(A), Squamous Epithelial/HPF 7(H) o/w Negative    Urine Culture Aerobic Bacterial: In process    Emergency Department Course:    Reviewed:  I reviewed nursing notes, vitals, past medical history and care everywhere    Assessments:  0504 I obtained history and examined the patient as noted above.     0547 I rechecked the patient and explained findings.     0752 Updated the patient.     Disposition:  The patient was discharged to home.     Impression & Plan     Medical Decision Making:  Patient presents with severe abdominal pain.  Cause of which unclear on examination pain is more local to the right side.  Doubt biliary colic due to location.  Relatively sudden onset.  Doubt appendicitis but due to severity did consider ultrasound recommended CT scan for total evaluation and was normal.  There is mention of constipated colon by CT recommended laxatives and follow-up with primary care.  No large ovarian cyst seen by CT scan doubt this is the cause patient's not tender in the right upper quadrant therefore further work-up for gallstones is not recommended at this time    Diagnosis:    ICD-10-CM    1. Abdominal pain,  generalized  R10.84 UA with Microscopic reflex to Culture     Urine Culture Aerobic Bacterial     Urine Culture Aerobic Bacterial   2. Other constipation  K59.09      Discharge Medications:  New Prescriptions    POLYETHYLENE GLYCOL (MIRALAX) 17 GM/DOSE POWDER    Take 17 g (1 capful) by mouth daily     Scribe Disclosure:  I, Jc Hughes, am serving as a scribe at 5:38 AM on 5/20/2021 to document services personally performed by Michelet Jacobs MD based on my observations and the provider's statements to me.     I, Randy Marquez, am serving as a scribe  at 7:17 AM on 5/20/2021 to document services personally performed by Michelet Jacobs MD based on my observations and the provider's statements to me.              Michelet Jacobs MD  05/23/21 1806

## 2021-05-20 NOTE — DISCHARGE INSTRUCTIONS
Your CT scan it showed no major emergency.  Please take ibuprofen or Tylenol for pain.  Please use laxatives like MiraLAX once or twice a day.  Return with fever or severe increase in abdominal pain.

## 2021-05-20 NOTE — ED NOTES
This note also relates to the following rows which could not be included:  BP - Cannot attach notes to unvalidated device data  BP - Mean - Cannot attach notes to unvalidated device data  Pulse - Cannot attach notes to unvalidated device data  Oximeter Heart Rate - Cannot attach notes to unvalidated device data  SpO2 - Cannot attach notes to unvalidated device data

## 2021-05-21 ENCOUNTER — TELEPHONE (OUTPATIENT)
Dept: EMERGENCY MEDICINE | Facility: CLINIC | Age: 26
End: 2021-05-21

## 2021-05-21 LAB
BACTERIA SPEC CULT: ABNORMAL
BACTERIA SPEC CULT: ABNORMAL
Lab: ABNORMAL
SPECIMEN SOURCE: ABNORMAL

## 2021-05-21 NOTE — TELEPHONE ENCOUNTER
[facility] Emergency Department Lab result notification [Adult-Female]    [place] ED lab result protocol used  Urine Culture    Reason for call  Notify of lab results, assess symptoms,  review ED providers recommendations/discharge instructions (if necessary) and advise per ED lab result f/u protocol    Lab Result (including Rx patient on, if applicable)  Preliminary urine culture report on 21 shows the presence of bacteria(s):  >100,000 colonies/mL   Non lactose fermenting gram negative rods  Emergency Dept/Urgent Care discharge antibiotic: None  Recommendations per [facility] ED Lab result Urine culture protocol.  Information table from Emergency Dept Provider visit on 21  Symptoms reported at ED visit (Chief complaint, HPI) Abdominal Pain     The history is provided by the patient.      Priscila Betancourt is a 26 year old female with history of asthma and HIV who presents via EMS for abdominal pain. The patient reports waking up to shooting pain radiating up and down her right side. She also reports 5/10 pain in the center of her abdomen with associated vomiting x4. She also mentions pleuritic pain and difficulty standing straight. Denies diarrhea or constipation. LMP was about 3 weeks ago and normal for her. She had a  section in 2019.       Significant Medical hx, if applicable (i.e. CKD, diabetes) Reviewed   Allergies No Known Allergies   Weight, if applicable Wt Readings from Last 2 Encounters:   21 68.5 kg (151 lb)   21 66.8 kg (147 lb 3.2 oz)      Coumadin/Warfarin [Yes /No] No   Creatinine Level (mg/dl) Creatinine   Date Value Ref Range Status   2021 0.74 0.52 - 1.04 mg/dL Final      Creatinine clearance (ml/min), if applicable Serum creatinine: 0.74 mg/dL 21 0500  Estimated creatinine clearance: 124.6 mL/min   Pregnant (Yes/No/NA) HCG Qualitative: Negative   Breastfeeding (Yes/No/NA) ?   ED providers Impression and Plan (applicable information)  Note not finised   ED diagnosis Last attending   Treatment team  Abdominal pain, generalized +1 more  Clinical impression        ED provider  Michelet Jacobs MD           RN Assessment (Patient s current Symptoms), include time called.  [Insert Left message here if message left]  10:06 Left voicemail message requesting a call back to [facility] ED Lab Result RN at [phone].  RN is available every day between 9 a.m. and 5:30 p.m.        Ct Du  [facility] [place]  Emergency Dept Lab Result RN  Ph# [phone]     Copy of Lab result   Contains abnormal data Urine Culture Aerobic Bacterial  Order: [id number]  Status:  Preliminary result   Visible to patient:  No (not released) Dx:  Abdominal pain, generalized  Specimen Information: Midstream Urine        Component 1d ago   Specimen Description Midstream Urine    Special Requests Specimen received in preservative P    Culture Micro Abnormal   >100,000 colonies/mL   Non lactose fermenting gram negative rods   Susceptibility testing in progress   P      Culture Micro <10,000 colonies/mL   urogenital dilia  P      [facility]         Specimen Collected: 05/20/21  7:35 AM Last Resulted: 05/21/21  7:18 AM

## 2021-05-22 RX ORDER — CEFPODOXIME PROXETIL 100 MG/1
100 TABLET, FILM COATED ORAL 2 TIMES DAILY
Qty: 10 TABLET | Refills: 0 | Status: SHIPPED | OUTPATIENT
Start: 2021-05-22 | End: 2021-05-27

## 2021-05-22 NOTE — TELEPHONE ENCOUNTER
[facility] Emergency Department/Urgent Care Lab result notification:    [place] ED lab result protocol used  Urine Culture    Reason for call  Notify of lab results, assess symptoms,  review ED providers recommendations/discharge instructions (if necessary) and advise per ED lab result f/u protocol    Lab Result   Final urine culture on 21 shows the presence of bacteria(s): >100,000 colonies/mL Escherichia coli   [facility] Emergency Dept discharge antibiotic: None  Recommendations in treatment per [facility] ED lab result Urine Culture protocol.  Information table from Emergency Dept Provider visit on 21  Symptoms reported at ED visit (Chief complaint, HPI) Abdominal Pain     The history is provided by the patient.      Priscila Betancourt is a 26 year old female with history of asthma and HIV who presents via EMS for abdominal pain. The patient reports waking up to shooting pain radiating up and down her right side. She also reports 5/10 pain in the center of her abdomen with associated vomiting x4. She also mentions pleuritic pain and difficulty standing straight. Denies diarrhea or constipation. LMP was about 3 weeks ago and normal for her. She had a  section in 2019   ED providers Impression and Plan (applicable information) Note not finished   Miscellaneous information  Michelet Jacobs MD     RN Assessment (Patient s current Symptoms), include time called.  [Insert Left message here if message left]  This RN did speak to patient yesterday at that time she felt well, could not talk as she was going into work, she did say to send in RX once final is in and leave a detailed message on her voicemail  11:55 Spoke with patient, she states in early January she had a reaction to Macrobid will treat with cefpodoxime per RN ED lab protocols  Will add Macrobid to her allergy list.  RN Recommendations/Instructions per [place] ED lab result protocol  Patient notified of lab  result and treatment recommendations.  Rx for cefpodoxime 100 mg bid x 5 days sent to [Pharmacy - [facility]].  RN reviewed information about Patient Education on preventing future UTI's.  1. Practice good personal hygiene. Wipe yourself from front to back after using the toilet. This helps keep bacteria from getting into the urethra. Keep the genital area clean and dry.  2. Drink plenty of fluids, such as water, juice, or other caffeine-free drinks.  Drink at least 6-8 glasses a day (1 1/2 to 2 1/2 liters), unless you must restrict fluids for other medical reasons. This will force the medicine (antibiotic) into your urinary system and flush the bacteria out of your body. Cranberry juice has been shown to help clear out the bacteria.  3. Empty your bladder. Always empty your bladder when you feel the urge to urinate. And always urinate before going to sleep. Urine that stays in your bladder can lead to infection. Try to urinate before and after sex as well.  4. Wear cotton underwear and cotton-lined panty hose; avoid tight-fitting pants.  5. Follow up with your health care provider as directed. He or she may test to make sure the infection has cleared. If necessary, additional treatment may be started.      Please Contact your PCP clinic or return to the Emergency department if your:    Symptoms return.    Symptoms do not improve after 3 days on antibiotic.    Symptoms do not resolve after completing antibiotic.    Symptoms worsen or other concerning symptom's.    [facility] [place]  [facility]gency [facility] Lab Result RN  Ph# [phone]     Copy of Lab result   Contains abnormal data Urine Culture Aerobic Bacterial  Order: [id number]  Status:  Final result   Visible to patient:  Yes (MyChart) Dx:  Abdominal pain, generalized  Specimen Information: Midstream Urine        Component 2d ago   Specimen Description Midstream Urine    Special Requests Specimen received in  preservative    Culture Micro Abnormal   >100,000 colonies/mL   Escherichia coli     Culture Micro <10,000 colonies/mL   urogenital dilia   Susceptibility testing not routinely done    Resulting Agency [employer] [facility]   Susceptibility    Escherichia coli (1)    Antibiotic Interpretation Sensitivity Method Status   AMPICILLIN Resistant >=32 ug/mL JACQUELINE Final   CEFAZOLIN Sensitive <=4 ug/mL JACQUELINE Final    Cefazolin JACQUELINE breakpoints are for the treatment of uncomplicated urinary tract    infections.  For the treatment of systemic infections, please contact the   laboratory for additional testing.   CEFOXITIN Sensitive <=4 ug/mL JACQUELINE Final   CEFTAZIDIME Sensitive <=1 ug/mL JACQUELINE Final   CEFTRIAXONE Sensitive <=1 ug/mL JACQUELINE Final   CIPROFLOXACIN Resistant >=4 ug/mL JACQUELINE Final   GENTAMICIN Sensitive <=1 ug/mL JACQUELINE Final   LEVOFLOXACIN Resistant >=8 ug/mL JACQUELINE Final   NITROFURANTOIN Sensitive <=16 ug/mL JACQUELINE Final   TOBRAMYCIN Sensitive <=1 ug/mL JACQUELINE Final   Trimethoprim/Sulfa Resistant >=16/304 ug/mL JACQUELINE Final   AMPICILLIN/SULBACTAM Intermediate 16 ug/mL JACQUELINE Final   Piperacillin/Tazo Sensitive <=4 ug/mL JACQUELINE Final   CEFEPIME Sensitive <=1 ug/mL JACQUELINE Final         Specimen Collected: 05/20/21  7:35 AM Last Resulted: 05/21/21  8:54 PM

## 2021-06-08 ENCOUNTER — TELEPHONE (OUTPATIENT)
Dept: FAMILY MEDICINE | Facility: CLINIC | Age: 26
End: 2021-06-08

## 2021-06-08 NOTE — TELEPHONE ENCOUNTER
6/8/21 2:59PM: Patient calling in wanting to have the antibiotic changed to one that she can tolerate. States that the antibiotic that was ordered by the ED she has had a reaction to. This RN reviewed with the patient the telephone encounter below. Advised the patient that it looks like Macrobid was the antibiotic that she had a reaction to and that the antibiotic that was prescribed by the ED lab result RN was cefpodoxime which is a different antibiotic class.   The patient states understanding of this and has no further questions.     Cecilia Maldonado RN  [facility] RN  Lung Nodule and ED Lab Result RN  Epic pool (ED late result f/u RN): [id number]  [facility] INCIDENTAL RADIOLOGY F/U NURSES: P [id number]  Ph# [phone]

## 2021-06-08 NOTE — TELEPHONE ENCOUNTER
Called patient to follow-up on UTI symptoms, left message on voicemail to call back. Routed to CR Triage Pool.    LIDIA Cerrato, RN - Patient Advocate and Liaison (PAL)   [facility]   [phone]

## 2021-06-08 NOTE — TELEPHONE ENCOUNTER
Reason for call:  Medication   If this is a refill request, has the caller requested the refill from the pharmacy already? Yes  Will the patient be using a [facility]? No  Name of the pharmacy and phone number for the current request: [facility] [phone]    Name of the medication requested: Medication for UTI    Other request: Please call patient once it has been sent to pharmacy.    Phone number to reach patient:  Cell number on file:    Telephone Information:   Mobile [phone]       Best Time:  STAT    Can we leave a detailed message on this number?  YES    Travel screening: Not Applicable

## 2021-06-08 NOTE — TELEPHONE ENCOUNTER
"Patient called back, reports she never received notification of medication at pharmacy due to \"lack of insurance\", transferred directly to [facility] LAB RN. Per note on 5/22:    \"Patient notified of lab result and treatment recommendations.  Rx for cefpodoxime 100 mg bid x 5 days sent to [Pharmacy - [facility]].  RN reviewed information about Patient Education on preventing future UTI's.\"    LIDIA Cerrato, RN - Patient Advocate and Liaison (PAL)   [facility]   [phone]  "

## 2021-06-21 ENCOUNTER — TELEPHONE (OUTPATIENT)
Dept: FAMILY MEDICINE | Facility: CLINIC | Age: 26
End: 2021-06-21

## 2021-06-21 NOTE — TELEPHONE ENCOUNTER
Pt calls,     S-(situation) and B-(background): see 5/20/21 ER encounter, had UTI, given cefpodoxime, was not covered by insurance, pt informs never received alternate medication, symptoms improved so never follow up, never took any medication for this, see previous messages, pt noticed now past few day urine smelly, denies fever, chills, n-v, wants alternate sent now, last visit one month ago? Will confirm with NWD per pt     A-(assessment): f/u UTI    R-(recommendations): routed to NWD, please review and advise plan, route to inform pt     Celine Abdul RN, BSN  Message handled by CLINIC NURSE.

## 2021-06-21 NOTE — TELEPHONE ENCOUNTER
Called pt, discussed, f/u appointment made, declines urgent care, does not want appointment tomorrow as cannot come, appointment made Wed am per pt request  Celine Abdul RN, BSN  Message handled by CLINIC NURSE.

## 2021-06-22 PROBLEM — K64.8 INTERNAL HEMORRHOID, BLEEDING: Status: ACTIVE | Noted: 2018-09-28

## 2021-06-22 PROBLEM — Z21 HIV POSITIVE (H): Status: ACTIVE | Noted: 2020-03-11

## 2021-06-22 PROBLEM — Z34.01 ENCOUNTER FOR SUPERVISION OF NORMAL FIRST PREGNANCY IN FIRST TRIMESTER: Status: ACTIVE | Noted: 2019-03-14

## 2021-06-22 PROBLEM — K62.5 BRIGHT RED RECTAL BLEEDING: Status: ACTIVE | Noted: 2018-09-28

## 2021-06-22 RX ORDER — ETHYL ALCOHOL 700 MG/ML
GEL TOPICAL
COMMUNITY
Start: 2021-06-18 | End: 2022-04-29

## 2021-06-22 RX ORDER — POLYETHYLENE GLYCOL AND PROPYLENE GLYCOL 4; 3 MG/ML; MG/ML
SOLUTION/ DROPS OPHTHALMIC
COMMUNITY
Start: 2021-06-18 | End: 2022-04-29

## 2021-06-22 NOTE — PROGRESS NOTES
Pre-Visit Planning   Next 5 appointments (look out 90 days)    Jun 23, 2021 10:20 AM  (Arrive by 10:00 AM)  Office Visit with Zoltan Rosenbaum PA-C  [facility] [address] [place] [id number] [phone]        Appointment Notes for this encounter:   f/u uti, see ER visit 5/20/21    Questionnaires Reviewed/Assigned  Additional questionnaires assigned      Patient preferred phone number: [phone]    Unable to reach patient and unable to leave voicemail.

## 2021-06-23 ENCOUNTER — OFFICE VISIT (OUTPATIENT)
Dept: FAMILY MEDICINE | Facility: CLINIC | Age: 26
End: 2021-06-23
Payer: COMMERCIAL

## 2021-06-23 VITALS
HEART RATE: 86 BPM | RESPIRATION RATE: 16 BRPM | TEMPERATURE: 97.9 F | BODY MASS INDEX: 24.61 KG/M2 | DIASTOLIC BLOOD PRESSURE: 62 MMHG | WEIGHT: 157.1 LBS | OXYGEN SATURATION: 99 % | SYSTOLIC BLOOD PRESSURE: 98 MMHG

## 2021-06-23 DIAGNOSIS — R30.0 DYSURIA: Primary | ICD-10-CM

## 2021-06-23 LAB
ALBUMIN UR-MCNC: NEGATIVE MG/DL
APPEARANCE UR: CLEAR
BACTERIA #/AREA URNS HPF: ABNORMAL /HPF
BILIRUB UR QL STRIP: NEGATIVE
COLOR UR AUTO: YELLOW
GLUCOSE UR STRIP-MCNC: NEGATIVE MG/DL
HGB UR QL STRIP: NEGATIVE
KETONES UR STRIP-MCNC: NEGATIVE MG/DL
LEUKOCYTE ESTERASE UR QL STRIP: ABNORMAL
NITRATE UR QL: NEGATIVE
NON-SQ EPI CELLS #/AREA URNS LPF: ABNORMAL /LPF
PH UR STRIP: 7 PH (ref 5–7)
RBC #/AREA URNS AUTO: ABNORMAL /HPF
SOURCE: ABNORMAL
SP GR UR STRIP: 1.01 (ref 1–1.03)
UROBILINOGEN UR STRIP-ACNC: 0.2 EU/DL (ref 0.2–1)
WBC #/AREA URNS AUTO: ABNORMAL /HPF

## 2021-06-23 PROCEDURE — 99213 OFFICE O/P EST LOW 20 MIN: CPT | Performed by: PHYSICIAN ASSISTANT

## 2021-06-23 PROCEDURE — 81001 URINALYSIS AUTO W/SCOPE: CPT | Performed by: PHYSICIAN ASSISTANT

## 2021-06-23 RX ORDER — CEFDINIR 300 MG/1
300 CAPSULE ORAL 2 TIMES DAILY
Qty: 14 CAPSULE | Refills: 0 | Status: SHIPPED | OUTPATIENT
Start: 2021-06-23 | End: 2021-06-30

## 2021-06-23 NOTE — PATIENT INSTRUCTIONS

## 2021-06-23 NOTE — PROGRESS NOTES
Assessment & Plan     Dysuria    UTI appears to be mostly improved. Will still treat since she never took the antibiotic. Will try omnicef as it appears to be sensitive based on last culture.     - UA reflex to Microscopic and Culture  - Urine Microscopic  - cefdinir (OMNICEF) 300 MG capsule; Take 1 capsule (300 mg) by mouth 2 times daily for 7 days             There are no Patient Instructions on file for this visit.    No follow-ups on file.    ALEX Joseph [facility] BOOM Francois is a 26 year old who presents for the following health issues     HPI     ED/UC Followup: Generalized Abdominal Pain and Constipation    Facility:  [facility]  Date of visit: 5/20/2021  Reason for visit: Abdominal Pain and Vomiting  Current Status: Prescribed cefpodoxime for UTI but did not start due to issues with cost.     Patient asymptomatic today. Last two days malodorous urine with right-sided abdominal pain (chronic from cyst). Some dysuria and frequency. Has some low back pain but this is also chronic.           Review of Systems   Constitutional, HEENT, cardiovascular, pulmonary, gi and gu systems are negative, except as otherwise noted.        Objective    BP 98/62 (BP Location: Right arm, Patient Position: Chair, Cuff Size: Adult Regular)   Pulse 86   Temp 97.9  F (36.6  C) (Oral)   Resp 16   Wt 71.3 kg (157 lb 1.6 oz)   LMP 06/11/2021   SpO2 99%   BMI 24.61 kg/m    Body mass index is 24.61 kg/m .       Physical Exam   GENERAL: healthy, alert and no distress  EYES: Eyes grossly normal to inspection, PERRL and conjunctivae and sclerae normal  RESP: lungs clear to auscultation - no rales, rhonchi or wheezes  CV: regular rate and rhythm, normal S1 S2, no S3 or S4, no murmur, click or rub, no peripheral edema and peripheral pulses strong  ABDOMEN: soft, nontender, no hepatosplenomegaly, no masses and bowel sounds normal  MS: no gross musculoskeletal defects  noted, no edema  SKIN: no suspicious lesions or rashes  NEURO: Normal strength and tone, mentation intact and speech normal  BACK: no CVA tenderness  PSYCH: mentation appears normal, affect normal/bright      Results for orders placed or performed in visit on 06/23/21 (from the past 24 hour(s))   UA reflex to Microscopic and Culture    Specimen: Midstream Urine   Result Value Ref Range    Color Urine Yellow     Appearance Urine Clear     Glucose Urine Negative NEG^Negative mg/dL    Bilirubin Urine Negative NEG^Negative    Ketones Urine Negative NEG^Negative mg/dL    Specific Gravity Urine 1.015 1.003 - 1.035    Blood Urine Negative NEG^Negative    pH Urine 7.0 5.0 - 7.0 pH    Protein Albumin Urine Negative NEG^Negative mg/dL    Urobilinogen Urine 0.2 0.2 - 1.0 EU/dL    Nitrite Urine Negative NEG^Negative    Leukocyte Esterase Urine Small (A) NEG^Negative    Source Midstream Urine    Urine Microscopic   Result Value Ref Range    WBC Urine 0 - 5 OTO5^0 - 5 /HPF    RBC Urine O - 2 OTO2^O - 2 /HPF    Squamous Epithelial /LPF Urine Few FEW^Few /LPF    Bacteria Urine Few (A) NEG^Negative /HPF

## 2021-08-18 NOTE — TELEPHONE ENCOUNTER
[facility] Emergency Department/Urgent Care Lab result notification     Patient/parent Name  Priscila    RN Assessment (Patient s current Symptoms), include time called.  [Insert Left message here if message left]  11:42 Patient returning call to ED lab results team, informed of preliminary urine culture, discussed starting treatment today. Patient states she is feeling fine, she is going into work right now. She prefers to wait until final report is complete for treatment. Will call patient when final is in, she states it is okay to leave her a voicemail. Preferred pharmacy is [facility] on [address] in [place]    [address] [facility] [place]  Emergency Dept Lab Result RN  Ph# [phone]        negative

## 2021-09-08 ENCOUNTER — HOSPITAL ENCOUNTER (EMERGENCY)
Facility: CLINIC | Age: 26
Discharge: HOME | End: 2021-09-08
Attending: PHYSICIAN ASSISTANT | Admitting: PHYSICIAN ASSISTANT
Payer: COMMERCIAL

## 2021-09-08 ENCOUNTER — APPOINTMENT (OUTPATIENT)
Dept: CT IMAGING | Facility: CLINIC | Age: 26
End: 2021-09-08
Attending: PHYSICIAN ASSISTANT
Payer: COMMERCIAL

## 2021-09-08 VITALS
HEART RATE: 103 BPM | OXYGEN SATURATION: 100 % | WEIGHT: 153 LBS | SYSTOLIC BLOOD PRESSURE: 113 MMHG | TEMPERATURE: 97.1 F | RESPIRATION RATE: 18 BRPM | DIASTOLIC BLOOD PRESSURE: 78 MMHG | BODY MASS INDEX: 23.96 KG/M2

## 2021-09-08 DIAGNOSIS — M54.2 NECK PAIN: ICD-10-CM
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
## 2021-09-08 DIAGNOSIS — R55 SYNCOPE: ICD-10-CM

## 2021-09-08 DIAGNOSIS — S09.90XA CLOSED HEAD INJURY, INITIAL ENCOUNTER: ICD-10-CM

## 2021-09-08 PROCEDURE — 70450 CT HEAD/BRAIN W/O DYE: CPT

## 2021-09-08 PROCEDURE — 72125 CT NECK SPINE W/O DYE: CPT

## 2021-09-08 PROCEDURE — 99284 EMERGENCY DEPT VISIT MOD MDM: CPT | Mod: 25

## 2021-09-08 RX ORDER — CYCLOBENZAPRINE HCL 10 MG
10 TABLET ORAL 3 TIMES DAILY PRN
Qty: 10 TABLET | Refills: 0 | Status: SHIPPED | OUTPATIENT
Start: 2021-09-08 | End: 2022-04-29

## 2021-09-08 ASSESSMENT — ENCOUNTER SYMPTOMS
NUMBNESS: 1
NECK PAIN: 1
ABDOMINAL PAIN: 0
VOMITING: 1
DIARRHEA: 1

## 2021-09-08 NOTE — LETTER
September 8, 2021      To Whom It May Concern:      Priscila Betancourt was seen in our Emergency Department today, 09/08/21.  I expect her condition to improve over the next 1-2 days.  She may return to work when improved.    Sincerely,        Cali Willson PA-C

## 2021-09-09 ENCOUNTER — PATIENT OUTREACH (OUTPATIENT)
Dept: FAMILY MEDICINE | Facility: CLINIC | Age: 26
End: 2021-09-09

## 2021-09-09 NOTE — DISCHARGE INSTRUCTIONS
For pain, you can take up to 1000 mg or 1 g of Tylenol.  You can take 600 mg of ibuprofen at one time.  You can alternate these medications every 3 hours.  Always take ibuprofen with food.  Never take more than 4 g (4000 mg) of Tylenol or 3200mg of ibuprofen in one day.  Do not take this amount for more than 1 week at a time.     Use Flexeril for muscular pain. This may be sedating. Do not take prior to driving.
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
## 2021-09-09 NOTE — ED NOTES
Bed: ED37  Expected date: 9/8/21  Expected time: 9:08 PM  Means of arrival:   Comments:  BV2 - 26F neck pain
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
## 2021-09-17 ENCOUNTER — TRANSFERRED RECORDS (OUTPATIENT)
Dept: HEALTH INFORMATION MANAGEMENT | Facility: CLINIC | Age: 26
End: 2021-09-17

## 2021-09-26 ENCOUNTER — HEALTH MAINTENANCE LETTER (OUTPATIENT)
Age: 26
End: 2021-09-26

## 2021-10-06 ENCOUNTER — APPOINTMENT (OUTPATIENT)
Dept: URBAN - METROPOLITAN AREA CLINIC 253 | Age: 26
Setting detail: DERMATOLOGY
End: 2021-10-07

## 2021-10-06 VITALS — RESPIRATION RATE: 15 BRPM | WEIGHT: 153 LBS | HEIGHT: 67 IN

## 2021-10-06 DIAGNOSIS — L20.89 OTHER ATOPIC DERMATITIS: ICD-10-CM

## 2021-10-06 PROCEDURE — OTHER PRESCRIPTION: OTHER

## 2021-10-06 PROCEDURE — OTHER ADDITIONAL NOTES: OTHER

## 2021-10-06 PROCEDURE — OTHER COUNSELING: OTHER

## 2021-10-06 PROCEDURE — 99203 OFFICE O/P NEW LOW 30 MIN: CPT

## 2021-10-06 RX ORDER — DESONIDE 0.5 MG/G
0.05% CREAM TOPICAL BID
Qty: 60 | Refills: 0 | Status: ERX | COMMUNITY
Start: 2021-10-06

## 2021-10-06 ASSESSMENT — LOCATION DETAILED DESCRIPTION DERM
LOCATION DETAILED: LEFT LATERAL SUPERIOR EYELID
LOCATION DETAILED: RIGHT LATERAL SUPERIOR EYELID

## 2021-10-06 ASSESSMENT — LOCATION SIMPLE DESCRIPTION DERM
LOCATION SIMPLE: LEFT SUPERIOR EYELID
LOCATION SIMPLE: RIGHT SUPERIOR EYELID

## 2021-10-06 ASSESSMENT — LOCATION ZONE DERM: LOCATION ZONE: EYELID

## 2021-10-06 NOTE — HPI: RASH
What Type Of Note Output Would You Prefer (Optional)?: Standard Output
Is The Patient Presenting As Previously Scheduled?: Yes
How Severe Is Your Rash?: moderate
Is This A New Presentation, Or A Follow-Up?: Rash
Additional History: She takes Zyrtec, Flonase and Benadryl. She is also on prednisone every once in a while for her asthma. She has had two doses this year.

## 2021-10-06 NOTE — PROCEDURE: ADDITIONAL NOTES
Render Risk Assessment In Note?: no
Additional Notes: Discussed dupixent if not able to gain control with topical steroids.
Detail Level: Zone

## 2022-03-09 NOTE — TELEPHONE ENCOUNTER
,  Subjective   Patient ID: Jessica is a 6 year old female who is accompanied by:her sister    Chief Complaint   Patient presents with   • Illness     Pt. was with grandmother who said her eyes look goopy, red and swollen.  Mom says she is fatigued   • Office Visit       MGM noticed eyes are red and with mucuos  Has chronic congestion and get therapy 2X/day  Is more congested than ususal since yesterday and also had congestion last week  No cough, no fever, no runny nose  More tired than usual  Tolerating her NG feeds  Has no V/D/C    Review of Systems   Constitutional: Negative for activity change, appetite change, fatigue, fever and irritability.   HENT: Negative for congestion, dental problem, ear discharge, ear pain, rhinorrhea and sore throat.    Eyes: Positive for redness. Negative for discharge.   Respiratory: Negative for cough and wheezing.    Cardiovascular: Negative for chest pain.   Gastrointestinal: Negative for abdominal pain, diarrhea, nausea and vomiting.   Musculoskeletal: Negative for neck pain.   Skin: Negative for rash.   Neurological: Negative for weakness and headaches.       Patient's medications, allergies, past medical, surgical, social and family histories were reviewed and updated as appropriate.    Objective   Vitals:Temp 98.7 °F (37.1 °C) (Temporal)   Wt 24.3 kg (53 lb 10.9 oz)   Physical Exam  Vitals and nursing note reviewed.   Constitutional:       General: She is active.      Appearance: Normal appearance. She is well-developed.   HENT:      Head: Normocephalic.      Right Ear: Tympanic membrane, ear canal and external ear normal.      Left Ear: Tympanic membrane, ear canal and external ear normal.      Nose: Nose normal.      Mouth/Throat:      Mouth: Mucous membranes are moist.      Pharynx: Oropharynx is clear.      Tonsils: No tonsillar exudate.      Neck: Normal range of motion and neck supple.   Eyes:      General:         Right eye: No discharge.         Left eye: No  Spoke to Pt, She advised that she's still not able to move her head/neck, but the muscle relaxer's have been helping so she could at least get some sleep. Pt advised she's applying heat/ice from time to time. Pt was advised that if symptoms are not better, to call and schedule visit with PCP.  Pt confirmed she understood instructions. / Helga Torres/EMT-B      discharge.      Extraocular Movements: Extraocular movements intact.      Conjunctiva/sclera: Conjunctivae normal.      Pupils: Pupils are equal, round, and reactive to light.   Cardiovascular:      Rate and Rhythm: Normal rate and regular rhythm.      Heart sounds: Normal heart sounds, S1 normal and S2 normal. No murmur heard.  Pulmonary:      Effort: Pulmonary effort is normal.      Breath sounds: Normal breath sounds and air entry.   Abdominal:      General: Abdomen is flat. Bowel sounds are normal. There is no distension.      Palpations: Abdomen is soft.      Tenderness: There is no abdominal tenderness.   Musculoskeletal:         General: Normal range of motion.   Skin:     General: Skin is warm.      Findings: No rash.   Neurological:      General: No focal deficit present.      Mental Status: She is alert.   Psychiatric:         Mood and Affect: Mood normal.         Assessment   Problem List Items Addressed This Visit     None      Visit Diagnoses     Feared complaint without diagnosis    -  Primary          Discussed with sister that exam is normal and sister agrees, to observe for now and call if symptoms return.

## 2022-04-28 ENCOUNTER — HOSPITAL ENCOUNTER (OUTPATIENT)
Facility: CLINIC | Age: 27
Setting detail: OBSERVATION
Discharge: HOME | End: 2022-04-30
Attending: EMERGENCY MEDICINE | Admitting: STUDENT IN AN ORGANIZED HEALTH CARE EDUCATION/TRAINING PROGRAM
Payer: COMMERCIAL

## 2022-04-28 DIAGNOSIS — E16.2 HYPOGLYCEMIA: ICD-10-CM

## 2022-04-28 PROBLEM — F43.25 ADJUSTMENT DISORDER WITH MIXED DISTURBANCE OF EMOTIONS AND CONDUCT: Status: ACTIVE | Noted: 2018-06-28

## 2022-04-28 LAB
ALBUMIN SERPL-MCNC: 3.4 G/DL (ref 3.4–5)
ALP SERPL-CCNC: 71 U/L (ref 40–150)
ALT SERPL W P-5'-P-CCNC: 18 U/L (ref 0–50)
ANION GAP SERPL CALCULATED.3IONS-SCNC: 11 MMOL/L (ref 3–14)
AST SERPL W P-5'-P-CCNC: 28 U/L (ref 0–45)
BILIRUB SERPL-MCNC: 1.1 MG/DL (ref 0.2–1.3)
BUN SERPL-MCNC: 10 MG/DL (ref 7–30)
CALCIUM SERPL-MCNC: 8.3 MG/DL (ref 8.5–10.1)
CHLORIDE BLD-SCNC: 106 MMOL/L (ref 94–109)
CO2 SERPL-SCNC: 21 MMOL/L (ref 20–32)
CREAT SERPL-MCNC: 0.7 MG/DL (ref 0.52–1.04)
D DIMER PPP FEU-MCNC: <0.27 UG/ML FEU (ref 0–0.5)
ERYTHROCYTE [DISTWIDTH] IN BLOOD BY AUTOMATED COUNT: 16.6 % (ref 10–15)
GFR SERPL CREATININE-BSD FRML MDRD: >90 ML/MIN/1.73M2
GLUCOSE BLD-MCNC: 49 MG/DL (ref 70–99)
GLUCOSE BLDC GLUCOMTR-MCNC: 105 MG/DL (ref 70–99)
GLUCOSE BLDC GLUCOMTR-MCNC: 107 MG/DL (ref 70–99)
GLUCOSE BLDC GLUCOMTR-MCNC: 144 MG/DL (ref 70–99)
GLUCOSE BLDC GLUCOMTR-MCNC: 33 MG/DL (ref 70–99)
GLUCOSE BLDC GLUCOMTR-MCNC: 63 MG/DL (ref 70–99)
GLUCOSE BLDC GLUCOMTR-MCNC: 93 MG/DL (ref 70–99)
GLUCOSE BLDC GLUCOMTR-MCNC: 97 MG/DL (ref 70–99)
HCG SERPL QL: NEGATIVE
HCT VFR BLD AUTO: 36.5 % (ref 35–47)
HGB BLD-MCNC: 12.1 G/DL (ref 11.7–15.7)
MAGNESIUM SERPL-MCNC: 1.7 MG/DL (ref 1.6–2.3)
MCH RBC QN AUTO: 30.1 PG (ref 26.5–33)
MCHC RBC AUTO-ENTMCNC: 33.2 G/DL (ref 31.5–36.5)
MCV RBC AUTO: 91 FL (ref 78–100)
PLATELET # BLD AUTO: 257 10E3/UL (ref 150–450)
POTASSIUM BLD-SCNC: 3.1 MMOL/L (ref 3.4–5.3)
PROT SERPL-MCNC: 7.9 G/DL (ref 6.8–8.8)
RBC # BLD AUTO: 4.02 10E6/UL (ref 3.8–5.2)
SARS-COV-2 RNA RESP QL NAA+PROBE: NEGATIVE
SODIUM SERPL-SCNC: 138 MMOL/L (ref 133–144)
WBC # BLD AUTO: 9.4 10E3/UL (ref 4–11)

## 2022-04-28 PROCEDURE — 85027 COMPLETE CBC AUTOMATED: CPT | Performed by: EMERGENCY MEDICINE

## 2022-04-28 PROCEDURE — 258N000001 HC RX 258: Performed by: EMERGENCY MEDICINE

## 2022-04-28 PROCEDURE — U0005 INFEC AGEN DETEC AMPLI PROBE: HCPCS | Performed by: EMERGENCY MEDICINE

## 2022-04-28 PROCEDURE — 96361 HYDRATE IV INFUSION ADD-ON: CPT

## 2022-04-28 PROCEDURE — 83525 ASSAY OF INSULIN: CPT | Performed by: STUDENT IN AN ORGANIZED HEALTH CARE EDUCATION/TRAINING PROGRAM

## 2022-04-28 PROCEDURE — G0378 HOSPITAL OBSERVATION PER HR: HCPCS

## 2022-04-28 PROCEDURE — C9803 HOPD COVID-19 SPEC COLLECT: HCPCS

## 2022-04-28 PROCEDURE — 96360 HYDRATION IV INFUSION INIT: CPT

## 2022-04-28 PROCEDURE — 85379 FIBRIN DEGRADATION QUANT: CPT | Performed by: EMERGENCY MEDICINE

## 2022-04-28 PROCEDURE — 82962 GLUCOSE BLOOD TEST: CPT

## 2022-04-28 PROCEDURE — 83735 ASSAY OF MAGNESIUM: CPT | Performed by: EMERGENCY MEDICINE

## 2022-04-28 PROCEDURE — 82533 TOTAL CORTISOL: CPT | Performed by: EMERGENCY MEDICINE

## 2022-04-28 PROCEDURE — 84681 ASSAY OF C-PEPTIDE: CPT | Performed by: STUDENT IN AN ORGANIZED HEALTH CARE EDUCATION/TRAINING PROGRAM

## 2022-04-28 PROCEDURE — 93005 ELECTROCARDIOGRAM TRACING: CPT

## 2022-04-28 PROCEDURE — 80053 COMPREHEN METABOLIC PANEL: CPT | Performed by: EMERGENCY MEDICINE

## 2022-04-28 PROCEDURE — 36415 COLL VENOUS BLD VENIPUNCTURE: CPT | Performed by: EMERGENCY MEDICINE

## 2022-04-28 PROCEDURE — 99219 PR INITIAL OBSERVATION CARE,LEVEL II: CPT | Performed by: STUDENT IN AN ORGANIZED HEALTH CARE EDUCATION/TRAINING PROGRAM

## 2022-04-28 PROCEDURE — 99285 EMERGENCY DEPT VISIT HI MDM: CPT | Mod: 25

## 2022-04-28 PROCEDURE — 84703 CHORIONIC GONADOTROPIN ASSAY: CPT | Performed by: EMERGENCY MEDICINE

## 2022-04-28 RX ORDER — DEXTROSE MONOHYDRATE 25 G/50ML
50 INJECTION, SOLUTION INTRAVENOUS ONCE
Status: COMPLETED | OUTPATIENT
Start: 2022-04-28 | End: 2022-04-28

## 2022-04-28 RX ORDER — POTASSIUM CHLORIDE 1500 MG/1
40 TABLET, EXTENDED RELEASE ORAL ONCE
Status: COMPLETED | OUTPATIENT
Start: 2022-04-28 | End: 2022-04-29

## 2022-04-28 RX ADMIN — DEXTROSE MONOHYDRATE AND SODIUM CHLORIDE: 5; .45 INJECTION, SOLUTION INTRAVENOUS at 18:47

## 2022-04-28 RX ADMIN — DEXTROSE MONOHYDRATE AND SODIUM CHLORIDE: 5; .45 INJECTION, SOLUTION INTRAVENOUS at 21:42

## 2022-04-28 RX ADMIN — DEXTROSE MONOHYDRATE 50 ML: 25 INJECTION, SOLUTION INTRAVENOUS at 18:38

## 2022-04-28 ASSESSMENT — ENCOUNTER SYMPTOMS
PALPITATIONS: 1
ABDOMINAL PAIN: 1
WEAKNESS: 1
DIAPHORESIS: 1

## 2022-04-28 NOTE — ED NOTES
Bed: ED08  Expected date: 4/28/22  Expected time: 6:20 PM  Means of arrival: Ambulance  Comments:  BV1  27F  Chest tightness   Home

## 2022-04-28 NOTE — ED PROVIDER NOTES
History   Chief Complaint:  Chest Pain and Hypoglycemia       The history is provided by the patient and the EMS personnel.      Priscila Betancourt is a 27 year old female with history of asthma and HIV positive who presents for evaluation of chest tightness. She reports feeling weak and waking up this afternoon with diaphoresis, abdominal pain, and chest discomfort. The patient describes tightness in her chest and reports palpitations throughout the day today. She called EMS and the patient was hypotensive and tachycardic when they arrived. She was then hypoglycemic en route, so they gave her some juice. Her blood sugar is currently 33 in the exam room. The patient has not had trouble with low blood sugar before. She notes that she ate a salad earlier, but not much else today. She denies taking any steroid medications recently.     Review of Systems   Constitutional: Positive for diaphoresis.   Cardiovascular: Positive for chest pain and palpitations.   Gastrointestinal: Positive for abdominal pain.   Neurological: Positive for weakness.   All other systems reviewed and are negative.        Allergies:  Macrobid [Nitrofurantoin]    Medications:  Albuterol  Flexeril  Flonase  Advair  Remeron  Miralax  Spiriva   Triumeq    Past Medical History:     Asymptomatic HIV infection status  Chronic obstructive airway disease with asthma   Adjustment disorder with mixed disturbance of emotions and conduct  Allergic rhinitis  Internal hemorrhoid, bleeding  Overweight   Uncomplicated severe persistent asthma     Past Surgical History:     section   Colonoscopy     Social History:  The patient presents alone.  She arrives at the ED by ambulance.     Physical Exam     Patient Vitals for the past 24 hrs:   BP Temp Temp src Pulse Resp SpO2   22 2130 106/83 -- -- 104 -- 95 %   22 94/63 -- -- 94 -- 100 %   22 1855 -- -- -- 95 -- 95 %   22 1845 105/57 -- -- 101 -- 94 %   22 1843 -- 97.6  F (36.4   C) Oral -- -- --   04/28/22 1837 105/59 -- -- 103 22 100 %       Physical Exam  Vitals and nursing note reviewed.   HENT:      Head: Normocephalic.   Cardiovascular:      Rate and Rhythm: Normal rate and regular rhythm.      Heart sounds: Normal heart sounds.   Pulmonary:      Effort: Pulmonary effort is normal.      Breath sounds: Normal breath sounds.   Abdominal:      General: Bowel sounds are normal.      Palpations: Abdomen is soft.   Musculoskeletal:         General: Normal range of motion.   Skin:     General: Skin is warm.      Capillary Refill: Capillary refill takes less than 2 seconds.   Neurological:      General: No focal deficit present.      Mental Status: She is alert.   Psychiatric:         Mood and Affect: Mood normal.           Emergency Department Course   ECG  ECG obtained at 1832, ECG read at 1838  Sinus tachycardia   Otherwise normal ECG   Rate 107 bpm. NE interval 162 ms. QRS duration 88 ms. QT/QTc 366/488 ms. P-R-T axes 71 56 39.     Laboratory:  Labs Ordered and Resulted from Time of ED Arrival to Time of ED Departure   COMPREHENSIVE METABOLIC PANEL - Abnormal       Result Value    Sodium 138      Potassium 3.1 (*)     Chloride 106      Carbon Dioxide (CO2) 21      Anion Gap 11      Urea Nitrogen 10      Creatinine 0.70      Calcium 8.3 (*)     Glucose 49 (*)     Alkaline Phosphatase 71      AST 28      ALT 18      Protein Total 7.9      Albumin 3.4      Bilirubin Total 1.1      GFR Estimate >90     GLUCOSE BY METER - Abnormal    GLUCOSE BY METER POCT 33 (*)    CBC WITH PLATELETS AND DIFFERENTIAL - Abnormal    WBC Count 9.4      RBC Count 4.02      Hemoglobin 12.1      Hematocrit 36.5      MCV 91      MCH 30.1      MCHC 33.2      RDW 16.6 (*)     Platelet Count 257     GLUCOSE BY METER - Abnormal    GLUCOSE BY METER POCT 144 (*)    DIFFERENTIAL - Abnormal    % Neutrophils 37      % Lymphocytes 57      % Monocytes 5      % Eosinophils 0      % Basophils 1      Absolute Neutrophils 3.5       Absolute Lymphocytes 5.4 (*)     Absolute Monocytes 0.5      Absolute Eosinophils 0.0      Absolute Basophils 0.1      RBC Morphology Confirmed RBC Indices      Platelet Assessment        Value: Automated Count Confirmed. Platelet morphology is normal.    Pathologist Review Comments       GLUCOSE BY METER - Abnormal    GLUCOSE BY METER POCT 105 (*)    GLUCOSE BY METER - Abnormal    GLUCOSE BY METER POCT 63 (*)    HCG QUALITATIVE PREGNANCY - Normal    hCG Serum Qualitative Negative     MAGNESIUM - Normal    Magnesium 1.7     D DIMER QUANTITATIVE - Normal    D-Dimer Quantitative <0.27     GLUCOSE BY METER - Normal    GLUCOSE BY METER POCT 97     GLUCOSE BY METER - Normal    GLUCOSE BY METER POCT 93     CORTISOL   COVID-19 VIRUS (CORONAVIRUS) BY PCR   COVID-19 VIRUS (CORONAVIRUS) BY PCR        Emergency Department Course:     Reviewed:  I reviewed nursing notes, vitals and past medical history    Assessments:  1829 I obtained history and examined the patient as noted above.   1856 I rechecked the patient and explained findings.   2105 I rechecked the patient at this time.   2116 I rechecked the patient. We discussed admission.     Consults:  2200 I consulted with Dr. Pino, hospitalist, regarding the patient's history and presentation here in the emergency department who accepted the patient for admission.     Interventions:  1838 dextrose 50% 50 mL IV  1847 dextrose 5% and 0.45% NaCl infusion IV    Disposition:  The patient was admitted to the hospital under the care of Dr. Pino.     Impression & Plan     Medical Decision Making:  Patient is a 27-year-old female who presents with not feeling well including chest pain abdominal pain and feeling weak.  Blood sugar on arrival is only 33 and amp of D50 was given after juice was given by EMS.  Patient's sugar was observed and reduced down to as low as 63 after eating.  D5 half was initiated.  Lab work is unremarkable patient did have some vague chest pain but not at risk  for coronary artery disease EKG is normal no concerns for pericarditis patient has an independent risk factor for PE in the form of HIV positive for D-dimer was sent and negative.  Patient's abdominal exam is relatively benign without focal tenderness white count LFTs and creatinine are all normal.  Patient is not pregnant.  Ultimately due to recurrent hypoglycemia patient was asked to eat in the emergency room and really did not.  Only had some juice but then refused to eat a turkey sandwich.  It is hard for me to discharge the patient with recurrent hypoglycemia in the setting and the cause of hypoglycemia unclear as patient is not on any medications that would offer recurrent hypoglycemia as she is on no oral hypoglycemics.  For now would recommend admission for IV fluids IV D5 half and serial blood sugar checks.  Care was discussed with the hospitalist and was admitted on observation.    Diagnosis:    ICD-10-CM    1. Hypoglycemia  E16.2        Scribe Disclosure:  I, Ana Hak, am serving as a scribe at 6:26 PM on 4/28/2022 to document services personally performed by Michelet Jacobs MD based on my observations and the provider's statements to me.          Michelet Jacobs MD  04/28/22 7961

## 2022-04-29 LAB
ANION GAP SERPL CALCULATED.3IONS-SCNC: 6 MMOL/L (ref 3–14)
ATRIAL RATE - MUSE: 107 BPM
ATRIAL RATE - MUSE: 87 BPM
BASOPHILS # BLD MANUAL: 0.1 10E3/UL (ref 0–0.2)
BASOPHILS NFR BLD MANUAL: 1 %
BUN SERPL-MCNC: 6 MG/DL (ref 7–30)
C PEPTIDE SERPL-MCNC: <0.1 NG/ML (ref 0.9–6.9)
CALCIUM SERPL-MCNC: 8.4 MG/DL (ref 8.5–10.1)
CHLORIDE BLD-SCNC: 102 MMOL/L (ref 94–109)
CO2 SERPL-SCNC: 25 MMOL/L (ref 20–32)
CORTIS SERPL-MCNC: 24.3 UG/DL (ref 4–22)
CREAT SERPL-MCNC: 0.5 MG/DL (ref 0.52–1.04)
DIASTOLIC BLOOD PRESSURE - MUSE: NORMAL MMHG
DIASTOLIC BLOOD PRESSURE - MUSE: NORMAL MMHG
EOSINOPHIL # BLD MANUAL: 0 10E3/UL (ref 0–0.7)
EOSINOPHIL NFR BLD MANUAL: 0 %
ERYTHROCYTE [DISTWIDTH] IN BLOOD BY AUTOMATED COUNT: 15.5 % (ref 10–15)
GFR SERPL CREATININE-BSD FRML MDRD: >90 ML/MIN/1.73M2
GLUCOSE BLD-MCNC: 86 MG/DL (ref 70–99)
GLUCOSE BLDC GLUCOMTR-MCNC: 100 MG/DL (ref 70–99)
GLUCOSE BLDC GLUCOMTR-MCNC: 100 MG/DL (ref 70–99)
GLUCOSE BLDC GLUCOMTR-MCNC: 105 MG/DL (ref 70–99)
GLUCOSE BLDC GLUCOMTR-MCNC: 105 MG/DL (ref 70–99)
GLUCOSE BLDC GLUCOMTR-MCNC: 106 MG/DL (ref 70–99)
GLUCOSE BLDC GLUCOMTR-MCNC: 113 MG/DL (ref 70–99)
GLUCOSE BLDC GLUCOMTR-MCNC: 164 MG/DL (ref 70–99)
GLUCOSE BLDC GLUCOMTR-MCNC: 59 MG/DL (ref 70–99)
GLUCOSE BLDC GLUCOMTR-MCNC: 74 MG/DL (ref 70–99)
GLUCOSE BLDC GLUCOMTR-MCNC: 86 MG/DL (ref 70–99)
GLUCOSE BLDC GLUCOMTR-MCNC: 93 MG/DL (ref 70–99)
HCT VFR BLD AUTO: 36.3 % (ref 35–47)
HGB BLD-MCNC: 12.2 G/DL (ref 11.7–15.7)
INSULIN SERPL-ACNC: <0.5 MU/L (ref 3–25)
INTERPRETATION ECG - MUSE: NORMAL
INTERPRETATION ECG - MUSE: NORMAL
LYMPHOCYTES # BLD MANUAL: 5.4 10E3/UL (ref 0.8–5.3)
LYMPHOCYTES NFR BLD MANUAL: 57 %
MCH RBC QN AUTO: 29.8 PG (ref 26.5–33)
MCHC RBC AUTO-ENTMCNC: 33.6 G/DL (ref 31.5–36.5)
MCV RBC AUTO: 89 FL (ref 78–100)
MONOCYTES # BLD MANUAL: 0.5 10E3/UL (ref 0–1.3)
MONOCYTES NFR BLD MANUAL: 5 %
NEUTROPHILS # BLD MANUAL: 3.5 10E3/UL (ref 1.6–8.3)
NEUTROPHILS NFR BLD MANUAL: 37 %
P AXIS - MUSE: 64 DEGREES
P AXIS - MUSE: 71 DEGREES
PATH REV: ABNORMAL
PLAT MORPH BLD: ABNORMAL
PLATELET # BLD AUTO: 230 10E3/UL (ref 150–450)
POTASSIUM BLD-SCNC: 3.9 MMOL/L (ref 3.4–5.3)
PR INTERVAL - MUSE: 162 MS
PR INTERVAL - MUSE: 170 MS
PREALB SERPL IA-MCNC: 31 MG/DL (ref 15–45)
QRS DURATION - MUSE: 82 MS
QRS DURATION - MUSE: 88 MS
QT - MUSE: 366 MS
QT - MUSE: 404 MS
QTC - MUSE: 486 MS
QTC - MUSE: 488 MS
R AXIS - MUSE: 36 DEGREES
R AXIS - MUSE: 56 DEGREES
RBC # BLD AUTO: 4.1 10E6/UL (ref 3.8–5.2)
RBC MORPH BLD: ABNORMAL
SODIUM SERPL-SCNC: 133 MMOL/L (ref 133–144)
SYSTOLIC BLOOD PRESSURE - MUSE: NORMAL MMHG
SYSTOLIC BLOOD PRESSURE - MUSE: NORMAL MMHG
T AXIS - MUSE: 39 DEGREES
T AXIS - MUSE: 63 DEGREES
TROPONIN I SERPL HS-MCNC: <3 NG/L
VENTRICULAR RATE- MUSE: 107 BPM
VENTRICULAR RATE- MUSE: 87 BPM
WBC # BLD AUTO: 5 10E3/UL (ref 4–11)

## 2022-04-29 PROCEDURE — G0378 HOSPITAL OBSERVATION PER HR: HCPCS

## 2022-04-29 PROCEDURE — 84134 ASSAY OF PREALBUMIN: CPT | Performed by: HOSPITALIST

## 2022-04-29 PROCEDURE — 250N000013 HC RX MED GY IP 250 OP 250 PS 637: Performed by: EMERGENCY MEDICINE

## 2022-04-29 PROCEDURE — 82962 GLUCOSE BLOOD TEST: CPT

## 2022-04-29 PROCEDURE — 84484 ASSAY OF TROPONIN QUANT: CPT | Performed by: INTERNAL MEDICINE

## 2022-04-29 PROCEDURE — 258N000001 HC RX 258: Performed by: STUDENT IN AN ORGANIZED HEALTH CARE EDUCATION/TRAINING PROGRAM

## 2022-04-29 PROCEDURE — 80048 BASIC METABOLIC PNL TOTAL CA: CPT | Performed by: STUDENT IN AN ORGANIZED HEALTH CARE EDUCATION/TRAINING PROGRAM

## 2022-04-29 PROCEDURE — 96361 HYDRATE IV INFUSION ADD-ON: CPT

## 2022-04-29 PROCEDURE — 258N000001 HC RX 258: Performed by: INTERNAL MEDICINE

## 2022-04-29 PROCEDURE — 36415 COLL VENOUS BLD VENIPUNCTURE: CPT | Performed by: STUDENT IN AN ORGANIZED HEALTH CARE EDUCATION/TRAINING PROGRAM

## 2022-04-29 PROCEDURE — 99207 PR NO BILLABLE SERVICE THIS VISIT: CPT | Performed by: INTERNAL MEDICINE

## 2022-04-29 PROCEDURE — 85027 COMPLETE CBC AUTOMATED: CPT | Performed by: STUDENT IN AN ORGANIZED HEALTH CARE EDUCATION/TRAINING PROGRAM

## 2022-04-29 PROCEDURE — 258N000001 HC RX 258

## 2022-04-29 PROCEDURE — 99225 PR SUBSEQUENT OBSERVATION CARE,LEVEL II: CPT | Performed by: HOSPITALIST

## 2022-04-29 PROCEDURE — 250N000013 HC RX MED GY IP 250 OP 250 PS 637: Performed by: HOSPITALIST

## 2022-04-29 RX ORDER — ALBUTEROL SULFATE 0.83 MG/ML
2.5 SOLUTION RESPIRATORY (INHALATION) EVERY 6 HOURS PRN
Status: DISCONTINUED | OUTPATIENT
Start: 2022-04-29 | End: 2022-04-30 | Stop reason: HOSPADM

## 2022-04-29 RX ORDER — ALBUTEROL SULFATE 90 UG/1
2 AEROSOL, METERED RESPIRATORY (INHALATION) 4 TIMES DAILY PRN
Status: DISCONTINUED | OUTPATIENT
Start: 2022-04-29 | End: 2022-04-30 | Stop reason: HOSPADM

## 2022-04-29 RX ORDER — DEXTROSE MONOHYDRATE 25 G/50ML
25-50 INJECTION, SOLUTION INTRAVENOUS
Status: DISCONTINUED | OUTPATIENT
Start: 2022-04-29 | End: 2022-04-30 | Stop reason: HOSPADM

## 2022-04-29 RX ORDER — ONDANSETRON 2 MG/ML
4 INJECTION INTRAMUSCULAR; INTRAVENOUS EVERY 6 HOURS PRN
Status: DISCONTINUED | OUTPATIENT
Start: 2022-04-29 | End: 2022-04-30 | Stop reason: HOSPADM

## 2022-04-29 RX ORDER — DEXTROSE MONOHYDRATE 25 G/50ML
50 INJECTION, SOLUTION INTRAVENOUS ONCE
Status: COMPLETED | OUTPATIENT
Start: 2022-04-29 | End: 2022-04-29

## 2022-04-29 RX ORDER — DEXTROSE MONOHYDRATE 25 G/50ML
INJECTION, SOLUTION INTRAVENOUS
Status: COMPLETED
Start: 2022-04-29 | End: 2022-04-29

## 2022-04-29 RX ORDER — LIDOCAINE 40 MG/G
CREAM TOPICAL
Status: DISCONTINUED | OUTPATIENT
Start: 2022-04-29 | End: 2022-04-30 | Stop reason: HOSPADM

## 2022-04-29 RX ORDER — ONDANSETRON 4 MG/1
4 TABLET, ORALLY DISINTEGRATING ORAL EVERY 6 HOURS PRN
Status: DISCONTINUED | OUTPATIENT
Start: 2022-04-29 | End: 2022-04-30 | Stop reason: HOSPADM

## 2022-04-29 RX ORDER — NICOTINE POLACRILEX 4 MG
15-30 LOZENGE BUCCAL
Status: DISCONTINUED | OUTPATIENT
Start: 2022-04-29 | End: 2022-04-30 | Stop reason: HOSPADM

## 2022-04-29 RX ADMIN — POTASSIUM CHLORIDE 40 MEQ: 1500 TABLET, EXTENDED RELEASE ORAL at 03:39

## 2022-04-29 RX ADMIN — ABACAVIR SULFATE, DOLUTEGRAVIR SODIUM, LAMIVUDINE 1 TABLET: 600; 50; 300 TABLET, FILM COATED ORAL at 10:26

## 2022-04-29 RX ADMIN — DEXTROSE MONOHYDRATE 50 ML: 500 INJECTION PARENTERAL at 03:51

## 2022-04-29 RX ADMIN — DEXTROSE MONOHYDRATE 50 ML: 25 INJECTION, SOLUTION INTRAVENOUS at 03:51

## 2022-04-29 RX ADMIN — DEXTROSE AND SODIUM CHLORIDE: 5; 450 INJECTION, SOLUTION INTRAVENOUS at 12:39

## 2022-04-29 RX ADMIN — DEXTROSE AND SODIUM CHLORIDE: 5; 450 INJECTION, SOLUTION INTRAVENOUS at 00:45

## 2022-04-29 NOTE — PROGRESS NOTES
"[facility]    Hospitalist Progress Note      Assessment & Plan   Nyronny Betancourt is a 27 year old female with a past medical history of asthma, HIV on antiviral therapy who presents with symptomatic hypoglycemia.    #Hypoglycemia: Patient presents for sweating, generalized weakness, dizziness on 4/28.  This occurred after work at home, she called 911.  She was brought to the ER for evaluation.  In the ER, her blood sugar was noted to be 33.  BMP showed a mild hypokalemia.  CBC unremarkable.  Patient given amp of dextrose along with juice.  -Overnight, patient had recurrent diaphoresis with chest pressure and shortness of breath.  She tells me that she felt \"panicked.\"  Similar to symptoms that brought her in.  At the time her blood sugar was again low at 59.  She was given orange juice with immediate relief of symptoms.  ECG without ischemic changes, troponin negative.   -Patient is now on a D5 drip.  She is eating and drinking more.  -Lab work-up thus far without clear etiology of hypoglycemia.  Insulin level low.  Random cortisol level on the higher side.  C-peptide is in process.  She denies any new medications.  This a.m., patient states she feels totally back to normal.  -We will stop the D5 drip later this morning.  Monitor blood sugars every 4 hours overnight to ensure patient does not have other episode.  Hypoglycemia protocol.   -It may be that patient's hypoglycemia is from poor oral intake on the day of admission patient tells me she had an egg and then a salad.  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    Addendum: I spoke with on call endocrinology.  Most commonly, hypoglycemia would be related to malnutrition.  Also consideration for OTC supplements which can cause hypoglycemia.  More rarely, IGF-2 can be elevated due to a non-islet cell tumor.  Will check prealbumin. Pt denies any weight loss or OTC dietary supplements.  No surreptitious use of insulin or other diabetic " agents.  She has been on antiviral therapy for over 2 years at this point.     #HIV: Resume antiviral therapy    #H/O Asthma: Not in exacerbation. Resume inhalers.     DVT Prophylaxis: Pneumatic Compression Devices  Code Status: Full Code   Dispo: Hopeful for discharge tomorrow. Pending improvement in BS over next 24 hours.       Allen Hernandez MD  Text Page    Interval History   Assumed care.  Admitted yesterday for hypoglycemia.  Had RRT overnight for recurrent symptomatic hypoglycemia.  Work-up in progress but so far negative.  This a.m., patient notes she feels back to baseline.  She denies any pain.  No shortness of breath.  No fevers or chills.  No new medications.  She tells me that she wants to take a shower.    -Data reviewed today: I reviewed all new labs and imaging results over the last 24 hours.     Physical Exam   Temp: (P) 98.3  F (36.8  C) Temp src: (P) Temporal BP: (P) 110/69 Pulse: (P) 80   Resp: (P) 16 SpO2: (P) 99 % O2 Device: (P) None (Room air)    There were no vitals filed for this visit.  Vital Signs with Ranges  Temp:  [97.4  F (36.3  C)-98.3  F (36.8  C)] (P) 98.3  F (36.8  C)  Pulse:  [] (P) 80  Resp:  [16-22] (P) 16  BP: ()/(57-83) (P) 110/69  SpO2:  [94 %-100 %] (P) 99 %  I/O last 3 completed shifts:  In: 1184 [P.O.:520; I.V.:664]  Out: -     Constitutional: Nontoxic, NAD  HEENT: Normocephalic. MMM, No elevation of JVD noted.   Respiratory: Nl WOB, Clear bilaterally, No wheezes or crackles  Cardiovascular: Regular, no murmur  GI: BS+, NT, ND  Skin/Integumen: WWP, no rash. No edema  Neuro: CNII-XII intact. Moves all extremities. No tremor. A&Ox3.    Medications     dextrose 5% and 0.45% NaCl 100 mL/hr at 04/29/22 0457       fluticasone-vilanterol  1 puff Inhalation Daily     sodium chloride (PF)  3 mL Intracatheter Q8H       Data   Recent Labs   Lab 04/29/22  0744 04/29/22  0650 04/29/22  0356 04/28/22  1902 04/28/22  1839   WBC  --  5.0  --   --  9.4   HGB  --  12.2  --   --   12.1   MCV  --  89  --   --  91   PLT  --  230  --   --  257   NA  --  133  --   --  138   POTASSIUM  --  3.9  --   --  3.1*   CHLORIDE  --  102  --   --  106   CO2  --  25  --   --  21   BUN  --  6*  --   --  10   CR  --  0.50*  --   --  0.70   ANIONGAP  --  6  --   --  11   DOMO  --  8.4*  --   --  8.3*   GLC 74 86 164*   < > 49*   ALBUMIN  --   --   --   --  3.4   PROTTOTAL  --   --   --   --  7.9   BILITOTAL  --   --   --   --  1.1   ALKPHOS  --   --   --   --  71   ALT  --   --   --   --  18   AST  --   --   --   --  28    < > = values in this interval not displayed.       No results found for this or any previous visit (from the past 24 hour(s)).

## 2022-04-29 NOTE — PROGRESS NOTES
Responded to RRT on this patient    Pt c/o diaphoresis, chest pressure, SOB    VS stable on my arrival.  , HR normal, sats 100% RA    Nursing had checked a BS and it was 59.  Pt had been given OJ to drink    Exam  RRR  CTA B  Normal mentation  Non-focal neuro exam    Labs reviewed; notable for hypoglycemia on admit    Pt is not diabetic.  No CAD hx.  No PE hx    Suspect sx related to symptomatic hypoglycemia (and per pt are similar to the sx patient presented with)    Plan  - 1 amp D50 ordered  - ECG done and reviewed - no acute changes, NSR  - tele monitoring  - serial troponin  - D5 IVF overnight  - lab work up for hypoglycemia of unclear cause underway    Addendum:  Pt reassessed.  Feels much better with resolution of sx after D50 administered.

## 2022-04-29 NOTE — PHARMACY-ADMISSION MEDICATION HISTORY
Admission medication history interview status for this patient is complete. See [facility] admission navigator for allergy information, prior to admission medications and immunization status.     Medication history interview done, indicate source(s): Patient  Medication history resources (including written lists, pill bottles, clinic record):[employer]  Pharmacy: [facility]    Changes made to PTA medication list:  Added: none  Changed: none  Reported as Not Taking: none  Removed: Flexeril, Advair, ibuprofen, Remeron, Miralax, Systane, Spiriva, eye ointment    Actions taken by pharmacist (provider contacted, etc):None     Additional medication history information:None    Medication reconciliation/reorder completed by provider prior to medication history?  Y   (Y/N)           Prior to Admission medications    Medication Sig Last Dose Taking? Auth Provider   albuterol (PROAIR HFA/PROVENTIL HFA/VENTOLIN HFA) 108 (90 Base) MCG/ACT inhaler Inhale 2 puffs into the lungs 4 times daily as needed for shortness of breath / dyspnea Past Month at Unknown time Yes Sally Garibay MD   albuterol (PROVENTIL) (2.5 MG/3ML) 0.083% neb solution Take 1 vial (2.5 mg) by nebulization every 6 hours as needed for shortness of breath / dyspnea More than a month at Unknown time Yes Sally Garibay MD   fluticasone (FLONASE) 50 MCG/ACT nasal spray USE 2 SPRAYS IN EACH NOSTRIL QD AS DIRECTED. USE 4 SALINE SPRAYS IN EACH NOSTRIL THEN BLOW NOSE TO CLEAN PRIOR TO USE 4/29/2022 at am Yes Reported, Patient   SYMBICORT 160-4.5 MCG/ACT Inhaler  4/29/2022 at am Yes Reported, Patient   TRIUMEQ 600- MG per tablet Take 1 tablet by mouth daily 4/28/2022 at am Yes Reported, Patient   Nebulizers (INNOSPIRE ESSENCE NEBULIZER) MISC UTD   Reported, Patient

## 2022-04-29 NOTE — H&P
[facility]    History and Physical - Hospitalist Service       Date of Admission:  4/28/2022    Assessment & Plan      Priscila Betancourt is a 27 year old female admitted on 4/28/2022. She presents with hypoglycemia.      Hypoglycemia    Generalized weakness    Assessment: Presents with diaphoresis/generalized weakness and abdominal pain on the day of admission.  Ultimately found to be hypoglycemic with blood sugar of 33 in the ER.  She is not on any steroids.  She has no prior history of hypoglycemia.  Unclear etiology for her hypoglycemia at this time.    Plan:   -Admit to observation  -check insulin/C-peptide levels  -Check cortisol level  -D5 with half-normal saline IV fluids overnight  -Follow blood sugars  -Hypoglycemia protocol      Moderate persistent asthma    Chronic obstructive airway disease with asthma (H)    Assessment/Plan: Continue prior to mission Advair/Symbicort/Spiriva once verified      HIV disease (H)    Assessment/Plan: Resume antiviral regimen once verified      Adjustment disorder with mixed disturbance of emotions and conduct    Assessment/Plan: Can resume prior to mission Remeron once work-up completed         Diet:  Regular Diet  DVT Prophylaxis: Pneumatic Compression Devices  Goldstein Catheter: Not present  Central Lines: None  Cardiac Monitoring: None  Code Status:   FULL CODE    Clinically Significant Risk Factors Present on Admission          # Hypocalcemia: Ca = 8.3 mg/dL (Ref range: 8.5 - 10.1 mg/dL) and/or iCa = N/A on admission, will replace as needed             Disposition Plan   Expected Discharge:  04/28/2022  Anticipated discharge location:  Awaiting care coordination huddle  Delays:          The patient's care was discussed with the Patient and ED Provider.    Kyrie Pino MD  Hospitalist Service  [facility]  Securely message with the Vocera Web Console (learn more here)  Text page via [employer] Paging/Directory          ______________________________________________________________________    Chief Complaint     Weakness  Hypoglycemia    History is obtained from the patient    History of Present Illness     Priscila Betancourt is a 27 year old female who with past medical history of adjustment disorder, asthma, HIV who presents for evaluation of generalized weakness/fatigue.    Patient works at a group home.  She reports on the day of admission, she woke up in the afternoon with diaphoresis and abdominal pain and significant weakness.  She activated EMS due to concerning symptoms and was found to be hypotensive and tachycardic.  She was found to be significantly hypoglycemic in route to the emergency department.  She has no prior history of hypoglycemia.  She denies any drug use.  She denies any recent new medications.  She denies any recent steroid use.  She reports that she ate a small salad on the day of admission otherwise normal self p.o. intake.  She denies any chest pain.  She denies any recent fevers or chills, no urinary complaints urgency or frequency.  She denies any swelling or rashes.  She otherwise has no other complaints at this time and feels much improved.    Review of Systems      The 10 point Review of Systems is negative other than noted in the HPI or here.     Past Medical History    I have reviewed this patient's medical history and updated it with pertinent information if needed.   Past Medical History:   Diagnosis Date     Asthma     Hosp as child, no intubations     Asymptomatic human immunodeficiency virus (HIV) infection status (H) 2019       Past Surgical History   I have reviewed this patient's surgical history and updated it with pertinent information if needed.  Past Surgical History:   Procedure Laterality Date      SECTION N/A 10/26/2019    Procedure:  SECTION;  Surgeon: Ema Ricardo DO;  Location: [facility]     COLONOSCOPY      Rectal bleeding due to chronic  constipation       Social History   I have reviewed this patient's social history and updated it with pertinent information if needed.  Social History     Tobacco Use     Smoking status: Never Smoker     Smokeless tobacco: Never Used   Substance Use Topics     Alcohol use: No     Drug use: No       Family History   I have reviewed this patient's family history and updated it with pertinent information if needed.  Family History   Problem Relation Age of Onset     No Known Problems Mother      No Known Problems Father      Family History Negative No family hx of      Prior to Admission Medications   Prior to Admission Medications   Prescriptions Last Dose Informant Patient Reported? Taking?   Nebulizers (INNOSPIRE ESSENCE NEBULIZER) MISC   Yes No   Sig: UTD   SYMBICORT 160-4.5 MCG/ACT Inhaler   Yes No   SYSTANE PRESERVATIVE FREE 0.4-0.3 % SOLN opthalmic solution   Yes No   Sig: INSTILL 1 DROP IN BOTH EYES FOUR TIMES DAILY   TRIUMEQ 600- MG per tablet   Yes No   Sig: Take 1 tablet by mouth daily   White Petrolatum-Mineral Oil (EYE LUBRICANT) OINT   Yes No   albuterol (PROAIR HFA/PROVENTIL HFA/VENTOLIN HFA) 108 (90 Base) MCG/ACT inhaler   No No   Sig: Inhale 2 puffs into the lungs 4 times daily as needed for shortness of breath / dyspnea   albuterol (PROVENTIL) (2.5 MG/3ML) 0.083% neb solution   No No   Sig: Take 1 vial (2.5 mg) by nebulization every 6 hours as needed for shortness of breath / dyspnea   cyclobenzaprine (FLEXERIL) 10 MG tablet   No No   Sig: Take 1 tablet (10 mg) by mouth 3 times daily as needed for muscle spasms   fluticasone (FLONASE) 50 MCG/ACT nasal spray   Yes No   Sig: USE 2 SPRAYS IN EACH NOSTRIL QD AS DIRECTED. USE 4 SALINE SPRAYS IN EACH NOSTRIL THEN BLOW NOSE TO CLEAN PRIOR TO USE   fluticasone-salmeterol (ADVAIR DISKUS) 250-50 MCG/DOSE inhaler   No No   Sig: Inhale 1 puff into the lungs 2 times daily   ibuprofen (ADVIL/MOTRIN) 600 MG tablet   No No   Sig: Take 1 tablet (600 mg) by mouth  every 8 hours as needed for fever or pain   mirtazapine (REMERON) 7.5 MG tablet   No No   Sig: TAKE 1 TABLET(7.5 MG) BY MOUTH AT BEDTIME   polyethylene glycol (MIRALAX) 17 GM/Dose powder   No No   Sig: Take 17 g (1 capful) by mouth daily   tiotropium (SPIRIVA RESPIMAT) 2.5 MCG/ACT inhaler   Yes No   Sig: INHALE 2 PUFFS PO ONCE DAILY      Facility-Administered Medications: None     Allergies   Allergies   Allergen Reactions     Macrobid [Nitrofurantoin]      Itching and hives per patient       Physical Exam   Vital Signs: Temp: 97.6  F (36.4  C) Temp src: Oral BP: 106/83 Pulse: 104   Resp: 22 SpO2: 95 % O2 Device: None (Room air)    Weight: 0 lbs 0 oz    Constitutional: awake, alert, cooperative, no apparent distress.   Eyes: Lids and lashes normal, pupils equal, round and reactive to light   ENT: Normocephalic, without obvious abnormality, atraumatic, sinuses nontender on palpation   Hematologic / Lymphatic: no cervical lymphadenopathy   Respiratory: CTABL   Cardiovascular: Tachycardic with regular rhythm with no m/r/g   GI: Normal bowel sounds, soft, non-distended, non-tender. Skin: normal skin color, texture, turgor   Musculoskeletal: There is no redness, warmth, or swelling of the joints. Full range of motion noted.   Neurologic: Awake, alert, oriented to name, place and time. Cranial nerves II-XII are grossly intact. Motor is 5 out of 5 bilaterally. Sensory is intact.   Neuropsychiatric: normal mood and affect      Data   Data reviewed today: I reviewed all medications, new labs and imaging results over the last 24 hours. I personally reviewed the EKG tracing showing Sinus tachycardia .    Most Recent 3 CBC's:  Recent Labs   Lab Test 04/28/22  1839 05/20/21  0500 05/13/21  0833   WBC 9.4 5.8 3.6*   HGB 12.1 11.2* 12.4   MCV 91 94 94    232 162     Most Recent 3 BMP's:  Recent Labs   Lab Test 04/28/22  2135 04/28/22  2114 04/28/22  2044 04/28/22  1902 04/28/22  1839 04/28/22  1829 05/20/21  0500  05/13/21  0833   NA  --   --   --   --  138  --  136 139   POTASSIUM  --   --   --   --  3.1*  --  3.0* 3.7   CHLORIDE  --   --   --   --  106  --  104 107   CO2  --   --   --   --  21 -- 24 28   BUN  --   --   --   --  10  --  6* 6*   CR  --   --   --   --  0.70  --  0.74 0.71   ANIONGAP  --   --   --   --  11  --  8 4   DOMO  --   --   --   --  8.3*  --  8.6 8.9   GLC 93 63* 97   < > 49*   < > 141* 74    < > = values in this interval not displayed.     Most Recent 2 LFT's:  Recent Labs   Lab Test 04/28/22  1839 05/20/21  0500   AST 28 154*   ALT 18 52*   ALKPHOS 71 76   BILITOTAL 1.1 0.6     Most Recent 3 INR's:No lab results found.  No results found for this or any previous visit (from the past 24 hour(s)).

## 2022-04-29 NOTE — PROGRESS NOTES
"PRIMARY DIAGNOSIS: \"GENERIC\" NURSING  OUTPATIENT/OBSERVATION GOALS TO BE MET BEFORE DISCHARGE:  1. ADLs back to baseline: Yes    2. Activity and level of assistance: Ambulating independently.    3. Pain status: Pain free.    4. Return to near baseline physical activity: Yes     Discharge Planner Nurse   Safe discharge environment identified: Yes  Barriers to discharge: Yes (hypoglycemia)       Entered by: Danielle Case RN 04/29/2022 6:46 AM     Pt A/O x 4. Upon transfer to unit, BG was 107. Pt was eating and drinking juice and comfortable. Prior to next BG check, pt called nurse and c/o chest pain and diaphoresis that woke her up. BG 59. Rapid response called and pt stabilized to . EKG negative. D50 given. New IV placed. Tele ordered.     Please review provider order for any additional goals.   Nurse to notify provider when observation goals have been met and patient is ready for discharge.  "

## 2022-04-29 NOTE — PLAN OF CARE
/70 (BP Location: Left arm)   Pulse 77   Temp 97.2  F (36.2  C) (Temporal)   Resp 14   Wt 67.8 kg (149 lb 8 oz)   LMP  (LMP Unknown)   SpO2 97%   BMI 23.42 kg/m      Goal Outcome Evaluation:    VSS. AO x4. Ind in room. IVF dc'd this shift. Oral intake encouraged. Blood sugars have ranged from . Denies pain. No symptoms of hypoglycemia this shift. Tele: SR. Will continue to monitor.

## 2022-04-29 NOTE — ED NOTES
[facility]  ED Nurse Handoff Report    Priscila Betancourt is a 27 year old female   ED Chief complaint: Chest Pain and Hypoglycemia  . ED Diagnosis:   Final diagnoses:   None     Allergies:   Allergies   Allergen Reactions     Macrobid [Nitrofurantoin]      Itching and hives per patient       Code Status: Full Code  Activity level - Baseline/Home:  Independent. Activity Level - Current:   Independent. Lift room needed: No. Bariatric: No   Needed: No   Isolation: No. Infection: Not Applicable.     Vital Signs:   Vitals:    04/28/22 1845 04/28/22 1855 04/28/22 2030 04/28/22 2130   BP: 105/57  94/63 106/83   Pulse: 101 95 94 104   Resp:       Temp:       TempSrc:       SpO2: 94% 95% 100% 95%       Cardiac Rhythm:  ,      Pain level:    Patient confused: No. Patient Falls Risk: No.   Elimination Status: Has voided   Patient Report - Initial Complaint: hypoglycemia. Focused Assessment:   18:54 Cardiac Cardiac (Adult) - Cardiac WDL: .WDL except; chest pain; all (Pt c/o chest pain this evening upon waking up from nap. Diaphoresis accompained chest pain. Pt has a history of anxiety and thinks it could be associated witht the chest pain.) Cardiac Rhythm: tachycardic   Chest Pain Assessment - Chest Pain Location: midsternal  Character: sharp  Associated Signs/Symptoms: diaphoresis  Chest Pain Intervention: cardiac monitor placed; cardiac biomarkers drawn; 12-lead ECG obtained; activity minimized  MM     18:54 Neuro Cognitive Neuro Cognitive (Adult) - Cognitive/Neuro/Behavioral WDL: .WDL except; all (Pt appears sleepy but is arousable. On scene for EMS, pt's BG was found to be in the 40's. No h/o of diabetes. ) Level of Consciousness: alert  Arousal Level: opens eyes spontaneously  Speech: clear  Mood/Behavior: calm; cooperative  Swallowing Signs/Symptoms: swallows without difficulty   [place] Coma Scale - Best Eye Response: 4-->(E4) spontaneous  Best Motor Response: 6-->(M6) obeys commands  Best Verbal  Response: 5-->(V5) oriented  [place] Coma Scale Score: 15   Stroke Exam Continued - Level of Consciousness: alert  Arousal Level: opens eyes spontaneously  Speech: clear   Motor Strength - Left Upper Motor Strength: 5 - active movement against gravity and full resistance  Right Upper Motor Strength: 5 - active movement against gravity and full resistance  Left Lower Motor Strength: 5 - active movement against gravity and full resistance  Right Lower Motor Strength: 5 - active movement against gravity and full resistance   Cognitive - Follows Commands: slow to respond   Other flowsheet entries - Best Language: 0 - No aphasia       Tests Performed:   Labs Ordered and Resulted from Time of ED Arrival to Time of ED Departure   COMPREHENSIVE METABOLIC PANEL - Abnormal       Result Value    Sodium 138      Potassium 3.1 (*)     Chloride 106      Carbon Dioxide (CO2) 21      Anion Gap 11      Urea Nitrogen 10      Creatinine 0.70      Calcium 8.3 (*)     Glucose 49 (*)     Alkaline Phosphatase 71      AST 28      ALT 18      Protein Total 7.9      Albumin 3.4      Bilirubin Total 1.1      GFR Estimate >90     GLUCOSE BY METER - Abnormal    GLUCOSE BY METER POCT 33 (*)    CBC WITH PLATELETS AND DIFFERENTIAL - Abnormal    WBC Count 9.4      RBC Count 4.02      Hemoglobin 12.1      Hematocrit 36.5      MCV 91      MCH 30.1      MCHC 33.2      RDW 16.6 (*)     Platelet Count 257     GLUCOSE BY METER - Abnormal    GLUCOSE BY METER POCT 144 (*)    DIFFERENTIAL - Abnormal    % Neutrophils 37      % Lymphocytes 57      % Monocytes 5      % Eosinophils 0      % Basophils 1      Absolute Neutrophils 3.5      Absolute Lymphocytes 5.4 (*)     Absolute Monocytes 0.5      Absolute Eosinophils 0.0      Absolute Basophils 0.1      RBC Morphology Confirmed RBC Indices      Platelet Assessment        Value: Automated Count Confirmed. Platelet morphology is normal.    Pathologist Review Comments       GLUCOSE BY METER - Abnormal    GLUCOSE  BY METER POCT 105 (*)    GLUCOSE BY METER - Abnormal    GLUCOSE BY METER POCT 63 (*)    HCG QUALITATIVE PREGNANCY - Normal    hCG Serum Qualitative Negative     MAGNESIUM - Normal    Magnesium 1.7     D DIMER QUANTITATIVE - Normal    D-Dimer Quantitative <0.27     GLUCOSE BY METER - Normal    GLUCOSE BY METER POCT 97     GLUCOSE BY METER - Normal    GLUCOSE BY METER POCT 93     CORTISOL   COVID-19 VIRUS (CORONAVIRUS) BY PCR     No orders to display   Abnormal Results:   Treatments provided: D50, 5% 0.45% NS  Family Comments:   OBS brochure/video discussed/provided to patient:  N/A  ED Medications:   Medications   dextrose 50 % injection 50 mL (50 mLs Intravenous Given 4/28/22 1838)   dextrose 5% and 0.45% NaCl infusion ( Intravenous Started 4/28/22 2142)     Drips infusing:  No  For the majority of the shift, the patient's behavior Green. Interventions performed were .    Sepsis treatment initiated: No     Patient tested for COVID 19 prior to admission: YES    ED Nurse Name/Phone Number: Farshad Stauffer RN, RECEIVING UNIT ED HANDOFF REVIEW    Above ED Nurse Handoff Report was reviewed: Yes  Reviewed by: Danielle Case RN on April 28, 2022 at 11:50 PM     9:46 PM

## 2022-04-30 VITALS
DIASTOLIC BLOOD PRESSURE: 67 MMHG | HEART RATE: 78 BPM | RESPIRATION RATE: 16 BRPM | SYSTOLIC BLOOD PRESSURE: 122 MMHG | WEIGHT: 147.5 LBS | BODY MASS INDEX: 23.1 KG/M2 | OXYGEN SATURATION: 98 % | TEMPERATURE: 97.2 F

## 2022-04-30 LAB
GLUCOSE BLDC GLUCOMTR-MCNC: 100 MG/DL (ref 70–99)
GLUCOSE BLDC GLUCOMTR-MCNC: 108 MG/DL (ref 70–99)
GLUCOSE BLDC GLUCOMTR-MCNC: 78 MG/DL (ref 70–99)
GLUCOSE BLDC GLUCOMTR-MCNC: 78 MG/DL (ref 70–99)
GLUCOSE BLDC GLUCOMTR-MCNC: 84 MG/DL (ref 70–99)
GLUCOSE BLDC GLUCOMTR-MCNC: 85 MG/DL (ref 70–99)

## 2022-04-30 PROCEDURE — 99217 PR OBSERVATION CARE DISCHARGE: CPT | Performed by: INTERNAL MEDICINE

## 2022-04-30 PROCEDURE — 82962 GLUCOSE BLOOD TEST: CPT

## 2022-04-30 PROCEDURE — 250N000013 HC RX MED GY IP 250 OP 250 PS 637: Performed by: HOSPITALIST

## 2022-04-30 PROCEDURE — G0378 HOSPITAL OBSERVATION PER HR: HCPCS

## 2022-04-30 RX ADMIN — ABACAVIR SULFATE, DOLUTEGRAVIR SODIUM, LAMIVUDINE 1 TABLET: 600; 50; 300 TABLET, FILM COATED ORAL at 09:42

## 2022-04-30 NOTE — PLAN OF CARE
Goal Outcome Evaluation:    Pt A/O x4, VSS on RA. BG ranging from . Able to correct BG with food/juice. Denies hypoglycemic symptoms. Denies pain. CMS intact. Tolerating regular diet. Independent in room. Remote tele- SR. Will continue to monitor.

## 2022-04-30 NOTE — PLAN OF CARE
Pt A&O, vitals monitored on RA.  Up ind in room.  BG checks 78 and 85.  Pt cleared for discharge today, instructions reviewed and no changes to medications.  IV removed.  Pt took all personal belongings and she ambulated to her family vehicle independently.
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
## 2022-05-02 ENCOUNTER — PATIENT OUTREACH (OUTPATIENT)
Dept: CARE COORDINATION | Facility: CLINIC | Age: 27
End: 2022-05-02
Payer: COMMERCIAL

## 2022-05-02 DIAGNOSIS — Z71.89 OTHER SPECIFIED COUNSELING: ICD-10-CM

## 2022-05-02 NOTE — PROGRESS NOTES
Clinic Care Coordination Contact  [facility]/Voicemail       Clinical Data: Care Coordinator Outreach  Outreach attempted x 1.  Left message on patient's voicemail with call back information and requested return call.  Plan: Care Coordinator will try to reach patient again in 1-2 business days.    Santo Thakkar  Community Health Worker  [facility]  Ph:[phone]

## 2022-05-03 NOTE — PROGRESS NOTES
Clinic Care Coordination Contact  [facility]/Voicemail       Clinical Data: Care Coordinator Outreach  Outreach attempted x 2.  Left message on patient's voicemail with call back information and requested return call.  Plan:  Care Coordinator will do no further outreaches at this time.      Santo Thakkar  Community Health Worker  [facility]  Ph:[phone]

## 2022-06-30 DIAGNOSIS — J44.89 CHRONIC OBSTRUCTIVE AIRWAY DISEASE WITH ASTHMA (H): ICD-10-CM

## 2022-07-02 ENCOUNTER — HEALTH MAINTENANCE LETTER (OUTPATIENT)
Age: 27
End: 2022-07-02

## 2022-07-05 RX ORDER — ALBUTEROL SULFATE 90 UG/1
AEROSOL, METERED RESPIRATORY (INHALATION)
Qty: 18 G | Refills: 0 | Status: SHIPPED | OUTPATIENT
Start: 2022-07-05 | End: 2022-08-11

## 2022-07-05 NOTE — TELEPHONE ENCOUNTER
Routing refill request to provider for review/approval because:  Patient needs to be seen because it has been more than 1 year since last office visit.  Failing ACT    Evelia Wakefield RN

## 2022-08-03 ENCOUNTER — APPOINTMENT (OUTPATIENT)
Dept: GENERAL RADIOLOGY | Facility: CLINIC | Age: 27
End: 2022-08-03
Attending: EMERGENCY MEDICINE
Payer: COMMERCIAL

## 2022-08-03 ENCOUNTER — HOSPITAL ENCOUNTER (EMERGENCY)
Facility: CLINIC | Age: 27
Discharge: HOME | End: 2022-08-04
Attending: EMERGENCY MEDICINE | Admitting: EMERGENCY MEDICINE
Payer: COMMERCIAL

## 2022-08-03 DIAGNOSIS — R94.31 PROLONGED Q-T INTERVAL ON ECG: ICD-10-CM

## 2022-08-03 DIAGNOSIS — R82.81 PYURIA: ICD-10-CM

## 2022-08-03 DIAGNOSIS — R00.2 PALPITATIONS: ICD-10-CM

## 2022-08-03 LAB
ALBUMIN UR-MCNC: 20 MG/DL
ANION GAP SERPL CALCULATED.3IONS-SCNC: 14 MMOL/L (ref 7–15)
APPEARANCE UR: CLEAR
BASOPHILS # BLD AUTO: 0 10E3/UL (ref 0–0.2)
BASOPHILS NFR BLD AUTO: 1 %
BILIRUB UR QL STRIP: NEGATIVE
BUN SERPL-MCNC: 5.2 MG/DL (ref 6–20)
CALCIUM SERPL-MCNC: 8.4 MG/DL (ref 8.6–10)
CHLORIDE SERPL-SCNC: 100 MMOL/L (ref 98–107)
COLOR UR AUTO: ABNORMAL
CREAT SERPL-MCNC: 0.68 MG/DL (ref 0.51–0.95)
DEPRECATED HCO3 PLAS-SCNC: 19 MMOL/L (ref 22–29)
EOSINOPHIL # BLD AUTO: 0.1 10E3/UL (ref 0–0.7)
EOSINOPHIL NFR BLD AUTO: 3 %
ERYTHROCYTE [DISTWIDTH] IN BLOOD BY AUTOMATED COUNT: 16.9 % (ref 10–15)
GFR SERPL CREATININE-BSD FRML MDRD: >90 ML/MIN/1.73M2
GLUCOSE BLDC GLUCOMTR-MCNC: 94 MG/DL (ref 70–99)
GLUCOSE SERPL-MCNC: 101 MG/DL (ref 70–99)
GLUCOSE UR STRIP-MCNC: NEGATIVE MG/DL
HCG SERPL QL: NEGATIVE
HCT VFR BLD AUTO: 37.9 % (ref 35–47)
HGB BLD-MCNC: 12.3 G/DL (ref 11.7–15.7)
HGB UR QL STRIP: ABNORMAL
HOLD SPECIMEN: NORMAL
HOLD SPECIMEN: NORMAL
IMM GRANULOCYTES # BLD: 0 10E3/UL
IMM GRANULOCYTES NFR BLD: 0 %
KETONES UR STRIP-MCNC: NEGATIVE MG/DL
LEUKOCYTE ESTERASE UR QL STRIP: ABNORMAL
LYMPHOCYTES # BLD AUTO: 1 10E3/UL (ref 0.8–5.3)
LYMPHOCYTES NFR BLD AUTO: 34 %
MCH RBC QN AUTO: 32.1 PG (ref 26.5–33)
MCHC RBC AUTO-ENTMCNC: 32.5 G/DL (ref 31.5–36.5)
MCV RBC AUTO: 99 FL (ref 78–100)
MONOCYTES # BLD AUTO: 0.3 10E3/UL (ref 0–1.3)
MONOCYTES NFR BLD AUTO: 9 %
NEUTROPHILS # BLD AUTO: 1.6 10E3/UL (ref 1.6–8.3)
NEUTROPHILS NFR BLD AUTO: 53 %
NITRATE UR QL: NEGATIVE
NRBC # BLD AUTO: 0 10E3/UL
NRBC BLD AUTO-RTO: 0 /100
PH UR STRIP: 8 [PH] (ref 5–7)
PLAT MORPH BLD: NORMAL
PLATELET # BLD AUTO: 102 10E3/UL (ref 150–450)
POTASSIUM SERPL-SCNC: 3.6 MMOL/L (ref 3.4–5.3)
RBC # BLD AUTO: 3.83 10E6/UL (ref 3.8–5.2)
RBC MORPH BLD: NORMAL
RBC URINE: 10 /HPF
SODIUM SERPL-SCNC: 133 MMOL/L (ref 136–145)
SP GR UR STRIP: 1.02 (ref 1–1.03)
SQUAMOUS EPITHELIAL: 1 /HPF
TROPONIN T SERPL HS-MCNC: <6 NG/L
UROBILINOGEN UR STRIP-MCNC: NORMAL MG/DL
WBC # BLD AUTO: 3 10E3/UL (ref 4–11)
WBC URINE: 43 /HPF

## 2022-08-03 PROCEDURE — 36415 COLL VENOUS BLD VENIPUNCTURE: CPT | Performed by: EMERGENCY MEDICINE

## 2022-08-03 PROCEDURE — 87086 URINE CULTURE/COLONY COUNT: CPT | Performed by: EMERGENCY MEDICINE

## 2022-08-03 PROCEDURE — 96360 HYDRATION IV INFUSION INIT: CPT

## 2022-08-03 PROCEDURE — 71046 X-RAY EXAM CHEST 2 VIEWS: CPT

## 2022-08-03 PROCEDURE — 82374 ASSAY BLOOD CARBON DIOXIDE: CPT | Performed by: EMERGENCY MEDICINE

## 2022-08-03 PROCEDURE — 84484 ASSAY OF TROPONIN QUANT: CPT | Performed by: EMERGENCY MEDICINE

## 2022-08-03 PROCEDURE — 84703 CHORIONIC GONADOTROPIN ASSAY: CPT | Performed by: EMERGENCY MEDICINE

## 2022-08-03 PROCEDURE — 96361 HYDRATE IV INFUSION ADD-ON: CPT

## 2022-08-03 PROCEDURE — 93005 ELECTROCARDIOGRAM TRACING: CPT

## 2022-08-03 PROCEDURE — 99285 EMERGENCY DEPT VISIT HI MDM: CPT | Mod: 25

## 2022-08-03 PROCEDURE — 81001 URINALYSIS AUTO W/SCOPE: CPT | Performed by: EMERGENCY MEDICINE

## 2022-08-03 PROCEDURE — 258N000003 HC RX IP 258 OP 636: Performed by: EMERGENCY MEDICINE

## 2022-08-03 PROCEDURE — 93005 ELECTROCARDIOGRAM TRACING: CPT | Mod: 76

## 2022-08-03 PROCEDURE — 82310 ASSAY OF CALCIUM: CPT | Performed by: EMERGENCY MEDICINE

## 2022-08-03 PROCEDURE — 85025 COMPLETE CBC W/AUTO DIFF WBC: CPT | Performed by: EMERGENCY MEDICINE

## 2022-08-03 RX ORDER — CEPHALEXIN 500 MG/1
500 CAPSULE ORAL ONCE
Status: DISCONTINUED | OUTPATIENT
Start: 2022-08-04 | End: 2022-08-04

## 2022-08-03 RX ADMIN — SODIUM CHLORIDE 1000 ML: 9 INJECTION, SOLUTION INTRAVENOUS at 22:04

## 2022-08-03 ASSESSMENT — ENCOUNTER SYMPTOMS
CHILLS: 1
FEVER: 0
PALPITATIONS: 1
CHEST TIGHTNESS: 1
VOMITING: 1
DIAPHORESIS: 1
DIARRHEA: 0
ABDOMINAL PAIN: 1
COUGH: 0

## 2022-08-04 VITALS
SYSTOLIC BLOOD PRESSURE: 117 MMHG | TEMPERATURE: 98.1 F | HEART RATE: 77 BPM | BODY MASS INDEX: 23.78 KG/M2 | WEIGHT: 151.8 LBS | OXYGEN SATURATION: 99 % | DIASTOLIC BLOOD PRESSURE: 68 MMHG | RESPIRATION RATE: 17 BRPM

## 2022-08-04 LAB
ATRIAL RATE - MUSE: 81 BPM
ATRIAL RATE - MUSE: 83 BPM
DIASTOLIC BLOOD PRESSURE - MUSE: NORMAL MMHG
DIASTOLIC BLOOD PRESSURE - MUSE: NORMAL MMHG
GLUCOSE BLDC GLUCOMTR-MCNC: 77 MG/DL (ref 70–99)
INTERPRETATION ECG - MUSE: NORMAL
INTERPRETATION ECG - MUSE: NORMAL
P AXIS - MUSE: 39 DEGREES
P AXIS - MUSE: 44 DEGREES
PR INTERVAL - MUSE: 142 MS
PR INTERVAL - MUSE: 144 MS
QRS DURATION - MUSE: 74 MS
QRS DURATION - MUSE: 80 MS
QT - MUSE: 436 MS
QT - MUSE: 436 MS
QTC - MUSE: 506 MS
QTC - MUSE: 512 MS
R AXIS - MUSE: 31 DEGREES
R AXIS - MUSE: 37 DEGREES
SYSTOLIC BLOOD PRESSURE - MUSE: NORMAL MMHG
SYSTOLIC BLOOD PRESSURE - MUSE: NORMAL MMHG
T AXIS - MUSE: 42 DEGREES
T AXIS - MUSE: 43 DEGREES
VENTRICULAR RATE- MUSE: 81 BPM
VENTRICULAR RATE- MUSE: 83 BPM

## 2022-08-04 RX ORDER — SULFAMETHOXAZOLE/TRIMETHOPRIM 800-160 MG
1 TABLET ORAL 2 TIMES DAILY
Qty: 10 TABLET | Refills: 0 | Status: SHIPPED | OUTPATIENT
Start: 2022-08-04 | End: 2022-08-09

## 2022-08-04 RX ORDER — CEPHALEXIN 500 MG/1
500 CAPSULE ORAL 2 TIMES DAILY
Qty: 14 CAPSULE | Refills: 0 | Status: SHIPPED | OUTPATIENT
Start: 2022-08-04 | End: 2022-08-04

## 2022-08-04 NOTE — ED PROVIDER NOTES
History   Chief Complaint:  Chest Pain       The history is provided by the patient.      Priscila Betancourt is a 27 year old female with history of asthma who presents for evaluation of chest pain. She confirms that she was seen in April for chest pressure and diaphoresis, when her blood sugar was found to be in the 30s. The patient was admitted and symptoms resolved after juice, but they did not find cause for hypoglycemia at that time. The patient reports feeling weak all day, noting abdominal pain after work and one episode of vomiting. She mentions that she took a nap before waking up with diaphoresis, chills, and notes that she was shaking. The patient reports tight chest pain and states her heart was racing and she had to take several deep breaths to try to calm herself down. She notes dysuria today as well. She mentions that she tried eating and drinking juice but she continued to feel weak. She denies any other weak or dizzy episodes since her April admission, until today. The patient denies cough, congestion, fever, and diarrhea. She mentions recent chills at night. She also notes wheezing while climbing stairs earlier, but states this is normal. The patient denies past heart or lung issues aside from asthma and seasonal allergies. She has a glucometer at work where she occasionally checks her sugars, but did not today. The patient has not seen an endocrinologist.     Review of Systems   Constitutional: Positive for chills and diaphoresis. Negative for fever.   HENT: Negative for congestion.    Respiratory: Positive for chest tightness. Negative for cough.    Cardiovascular: Positive for chest pain and palpitations.   Gastrointestinal: Positive for abdominal pain and vomiting. Negative for diarrhea.   All other systems reviewed and are negative.      Allergies:  Macrobid [Nitrofurantoin]    Medications:  Albuterol  Symbicort  Triumeq     Past Medical History:     Moderate persistent asthma  HIV  disease  Chronic obstructive airway disease with asthma  Adjustment disorder with mixed disturbance of emotions and conduct  Allergic rhinitis  Internal hemorrhoid  Hypoglycemia    Anemia  Anxiety state     Past Surgical History:     section   Colonoscopy      Family History:  Asthma     Social History:  The patient presents alone.   She arrived to the ED via private vehicle.     Physical Exam     Patient Vitals for the past 24 hrs:   BP Temp Temp src Pulse Resp SpO2 Weight   22 0027 117/68 -- -- 77 17 99 % --   22 2030 128/71 98.1  F (36.7  C) Oral 83 18 99 % 68.9 kg (151 lb 12.8 oz)       Physical Exam    Gen: alert  HEENT: PERRL, oropharynx clear  Neck: normal ROM  CV: RRR, no murmurs  Pulm: breath sounds equal, lungs clear  Abd: Soft, nontender  Back: no evidence of injury, no cva tenderness  MSK: no deformity, moves all extremities  Skin: no rash  Neuro: alert, appropriate conversation and interaction    Emergency Department Course   ECG #1  ECG taken at 2042, ECG read at 2318  Normal sinus rhythm  Prolonged QT  Abnormal ECG.  Rate 83 bpm. VT interval 142 ms. QRS duration 74 ms. QT/QTc 436/512 ms. P-R-T axes 44 37 43.     ECG #2  ECG taken at 0021, ECG read at 0026  Normal sinus rhythm  Cannot rule out anterior infarct, age undetermined  Prolonged QT  Abnormal ECG.   Repeat ECG.  Rate 81 bpm. VT interval 144 ms. QRS duration 80 ms. QT/QTc 436/506 ms. P-R-T axes 39 31 42.     Imaging:  Chest XR,  PA & LAT   Final Result   IMPRESSION: No change. Heart size and pulmonary vessels normal. Lungs are clear.      Leadless EKG Monitor 3 to 7 Days    (Results Pending)   Report per radiology    Laboratory:  Labs Ordered and Resulted from Time of ED Arrival to Time of ED Departure   BASIC METABOLIC PANEL - Abnormal       Result Value    Creatinine 0.68      Sodium 133 (*)     Potassium 3.6      Urea Nitrogen 5.2 (*)     Chloride 100      Carbon Dioxide (CO2) 19 (*)     Anion Gap 14      Glucose 101 (*)      GFR Estimate >90      Calcium 8.4 (*)    CBC WITH PLATELETS AND DIFFERENTIAL - Abnormal    WBC Count 3.0 (*)     RBC Count 3.83      Hemoglobin 12.3      Hematocrit 37.9      MCV 99      MCH 32.1      MCHC 32.5      RDW 16.9 (*)     Platelet Count 102 (*)     % Neutrophils 53      % Lymphocytes 34      % Monocytes 9      % Eosinophils 3      % Basophils 1      % Immature Granulocytes 0      NRBCs per 100 WBC 0      Absolute Neutrophils 1.6      Absolute Lymphocytes 1.0      Absolute Monocytes 0.3      Absolute Eosinophils 0.1      Absolute Basophils 0.0      Absolute Immature Granulocytes 0.0      Absolute NRBCs 0.0     ROUTINE UA WITH MICROSCOPIC - Abnormal    Color Urine Light Yellow      Appearance Urine Clear      Glucose Urine Negative      Bilirubin Urine Negative      Ketones Urine Negative      Specific Gravity Urine 1.016      Blood Urine Trace (*)     pH Urine 8.0 (*)     Protein Albumin Urine 20  (*)     Urobilinogen Urine Normal      Nitrite Urine Negative      Leukocyte Esterase Urine Small (*)     RBC Urine 10 (*)     WBC Urine 43 (*)     Squamous Epithelials Urine 1     GLUCOSE BY METER - Normal    GLUCOSE BY METER POCT 94     TROPONIN T, HIGH SENSITIVITY - Normal    Troponin T, High Sensitivity <6     HCG QUALITATIVE PREGNANCY - Normal    hCG Serum Qualitative Negative     GLUCOSE BY METER - Normal    GLUCOSE BY METER POCT 77     RBC AND PLATELET MORPHOLOGY    Platelet Assessment        Value: Automated Count Confirmed. Platelet morphology is normal.    RBC Morphology Confirmed RBC Indices     GLUCOSE MONITOR NURSING POCT   URINE CULTURE        Emergency Department Course:     Reviewed:  I reviewed nursing notes, vitals, past medical history and Care Everywhere    Assessments:  2115 I obtained history and examined the patient as noted above.   2352 I rechecked the patient and explained findings.   0030 I rechecked and updated the patient.     Interventions:  2204 NS 1 L IV    Keflex 500 mg Oral      Disposition:  The patient was discharged to home.     Impression & Plan     Medical Decision Making:  Patient presents for palpitations and chest pain.  Patient reports palpitations here.  During this time, patient is sinus rhythm on EKG and telemetry.  Patient had prior admission for hypoglycemia of unclear etiology.  No hypoglycemia today and blood sugars were taken prior to ingesting carbohydrate.  I did not feel that hypoglycemia was the likely causing her symptoms today.  Hx of hypoglycemia is unusual and patient reports other episodes that get better with juice.  Does not have glucometer at home.  Will order glucometer.  Pt is aware of how to test blood glucose.  Will test BID and when symptomatic and take log to PCP.      Patient with some intermittent chills and dysuria.  UA does have pyuria.  [facility]  Reports recent treatment for UTI.    CP- EKG showed NSR and trop negative.  CXR neg.  Offered COVID test and declines.      Prolonged QT and palpitations but having palpitations here and NSR on tele.  Given intermittent episodes since APril, will order zio patch.    Pt aware of need for close PCP follow up and to return for syncope or other worsening.      Diagnosis:    ICD-10-CM    1. Palpitations  R00.2 Leadless EKG Monitor 3 to 7 Days   2. Prolonged Q-T interval on ECG  R94.31    3. Pyuria  R82.81        Discharge Medications:   (R00.2) Palpitations  Plan: Leadless EKG Monitor 3 to 7 Days      (R94.31) Prolonged Q-T interval on ECG      (R82.81) Pyuria- recent treatment with abx.  Await culture result  - await cultures        Scribe Disclosure:  I, RODRIGUEZ GASTELUM, am serving as a scribe at 9:06 PM on 8/3/2022 to document services personally performed by Valerie Carlson MD based on my observations and the provider's statements to me.   I, Ana Toussaint, am serving as a scribe  at 9:26 PM on 8/3/2022.        Valerie Carlson,  MD  08/04/22 0149       Valerie Carlson MD  08/04/22 9422

## 2022-08-04 NOTE — ED PROVIDER NOTES
Patient called and at 951 on 8-4 requesting a different antibiotic be called in for her.  She was given a dose of Keflex in the ED yesterday to cover for UTI it looks like.  I called in a prescription for Bactrim to her pharmacy to cover for UTI at her request.     Emiliano Arzate MD  08/04/22 0903

## 2022-08-04 NOTE — ED TRIAGE NOTES
Patient states she woke 30mins ago from a nap and states she was having chest pain, diaphoresis, and dizziness. Patient states she has had issues like this before and it was hypoglycemia. Blood Sugar 94 in Triage     Triage Assessment     Row Name 08/03/22 2025       Triage Assessment (Adult)    Airway WDL WDL       Respiratory WDL    Respiratory WDL WDL       Skin Circulation/Temperature WDL    Skin Circulation/Temperature WDL WDL       Cardiac WDL    Cardiac WDL WDL       Peripheral/Neurovascular WDL    Peripheral Neurovascular WDL WDL       Cognitive/Neuro/Behavioral WDL    Cognitive/Neuro/Behavioral WDL WDL

## 2022-08-04 NOTE — DISCHARGE INSTRUCTIONS
An outpatient zio patch (heart monitor) was ordered for you.  The cardiac testing department will call you to schedule this in the next several days.  Please follow up on this result with your primary care doctor.    A prescription was written for a glucometer.  Check you glucose in the AM and 2 hours after a meal.  Also check blood sugar if feeling shaky or having palpitations.  Take glucose log to your doctor.  Consider consult with endocrinology.  If blood glucose less than 60, take juice or carbs    Discharge Instructions  Palpitations    Palpitations are an unusual awareness of your heartbeat. People often describe this as the heart skipping, fluttering, racing, irregular, or pounding. At this time, your provider has found no signs that your palpitations are due to a serious or life-threatening condition. However, sometimes there is a serious problem that does not show up right away.    Palpitations can be caused by caffeine, cigarettes, diet pills, energy drinks or supplements, other stimulants, and medications and street drugs. They can also be caused by anxiety, hormone conditions such as high thyroid, and other medical conditions. Sometimes they are a sign of abnormal rhythm in the heart. At this time, your provider did not find any dangerous cause of your symptoms.    Generally, every Emergency Department visit should have a follow-up clinic visit with either a primary or a specialty clinic/provider. Please follow-up as instructed by your emergency provider today.    Return to the Emergency Department if:  You get chest pain or tightness.  You are short of breath.  You get very weak or tired.  You pass out or faint.  Your heart rate is over 120 beats per minute for more than 10 minutes while you are resting.   You have anything else that worries you.    What can I do to help myself?  Fill any prescriptions the provider gave you and take them right away.   Follow your provider s instructions about the  prescription medicines you are on. Sometimes the provider may tell you to stop taking a medicine or change the dose.  If you smoke, this may be a good time to quit! The less you can smoke, the better.  Do not use energy drinks, diet pills, or stimulants. Limit your use of caffeine.  If you were given a prescription for medicine here today, be sure to read all of the information (including the package insert) that comes with your prescription.  This will include important information about the medicine, its side effects, and any warnings that you need to know about.  The pharmacist who fills the prescription can provide more information and answer questions you may have about the medicine.  If you have questions or concerns that the pharmacist cannot address, please call or return to the Emergency Department.     Remember that you can always come back to the Emergency Department if you are not able to see your regular provider in the amount of time listed above, if you get any new symptoms, or if there is anything that worries you.

## 2022-08-05 ENCOUNTER — HOSPITAL ENCOUNTER (OUTPATIENT)
Dept: CARDIOLOGY | Facility: CLINIC | Age: 27
Discharge: HOME | End: 2022-08-05
Attending: EMERGENCY MEDICINE | Admitting: EMERGENCY MEDICINE
Payer: COMMERCIAL

## 2022-08-05 ENCOUNTER — TELEPHONE (OUTPATIENT)
Dept: EMERGENCY MEDICINE | Facility: CLINIC | Age: 27
End: 2022-08-05

## 2022-08-05 DIAGNOSIS — R00.2 PALPITATIONS: ICD-10-CM

## 2022-08-05 LAB — BACTERIA UR CULT: NO GROWTH

## 2022-08-05 PROCEDURE — 93242 EXT ECG>48HR<7D RECORDING: CPT

## 2022-08-05 PROCEDURE — 93244 EXT ECG>48HR<7D REV&INTERPJ: CPT | Performed by: INTERNAL MEDICINE

## 2022-08-05 NOTE — TELEPHONE ENCOUNTER
"[facility] Emergency Department/Urgent Care Lab result notification:    Reason for call  Notify of lab results, assess symptoms,  review ED providers recommendations (if necessary) and advise per ED lab result f/u protocol.    Lab result  Final urine culture report shows \"NO GROWTH\" and is NEGATIVE.  [facility] Emergency Dept discharge antibiotic: Sulfamethoxazole-Trimethoprim (Bactrim DS, Septra DS) 800-160 mg PO tablet,  1 tablet by mouth 2 times daily for 5 days.  [facility] Emergency Dept discharge Rx antibiotic for UTI only (Yes/No): Yes  RN to confirm if Patient took antibiotic within 3 days prior to urine culture collection.  Recommendations in treatment per [facility] ED Lab result Urine culture protocol.  4:16p  Spoke with patient and relayed no growth urine culture, she states she did have an antibiotic \" a few days\" prior to [facility] ER visit  She will continue with the antibiotic as directed.  She had no concerns at this time.        Ct Du RN  [facility] Result RN  [facility] Emergency Dept Lab Result RN  Ph# [phone]    "

## 2022-08-06 DIAGNOSIS — J44.89 CHRONIC OBSTRUCTIVE AIRWAY DISEASE WITH ASTHMA (H): ICD-10-CM

## 2022-08-09 NOTE — TELEPHONE ENCOUNTER
MA/ to assist with updating needed screenings and/or scheduling follow up appointment.    Routing refill request to provider for review/approval because:   Asthma Maintenance Inhalers - Anticholinergics Failed 08/06/2022 04:08 AM   Protocol Details  Asthma control assessment score within normal limits in last 6 months          Melany Butler RN

## 2022-08-11 RX ORDER — ALBUTEROL SULFATE 90 UG/1
AEROSOL, METERED RESPIRATORY (INHALATION)
Qty: 18 G | Refills: 0 | Status: SHIPPED | OUTPATIENT
Start: 2022-08-11 | End: 2023-03-17

## 2022-08-23 ENCOUNTER — VIRTUAL VISIT (OUTPATIENT)
Dept: PEDIATRICS | Facility: CLINIC | Age: 27
End: 2022-08-23
Payer: COMMERCIAL

## 2022-08-23 DIAGNOSIS — R00.2 PALPITATIONS: Primary | ICD-10-CM

## 2022-08-23 PROCEDURE — 99212 OFFICE O/P EST SF 10 MIN: CPT | Mod: 95 | Performed by: INTERNAL MEDICINE

## 2022-08-23 NOTE — PROGRESS NOTES
Patient provided fax # for work to fax workability letter. Letter faxed. Fax # [phone] to Sera hassan.  Beatrice Conde LPN

## 2022-08-23 NOTE — LETTER
2022      Re: Priscila Betancourt  : 1995   [id number] [address] [address]              To Whom It May Concern:     I had a visit with Priscila Betancourt today. She may continue to work without restrictions.           Sincerely,          Misha Lebron MD

## 2022-08-23 NOTE — PROGRESS NOTES
Priscila is a 27 year old who is being evaluated via a billable video visit.      Assessment & Plan       ICD-10-CM    1. Palpitations  R00.2      Essentially normal 6 day zio patch.  No additional evaluation needed at this time.  She needs a note for work, completed.    Misha Lebron MD  [facility] [place]    Subjective   Priscila is a 27 year old, presenting for the following health issues:      HPI     Follow-up palpitations.  ED visit 8/3/22.  Had noted prolonged QT on EKG.  Holter monitor completed, 6 days. No significant abnormalities noted.  Is feeling better than at the time of her ED visit.  Needs a note clearing her to work.         Objective           Vitals:  No vitals were obtained today due to virtual visit.    Physical Exam   GEN: No distress  LUNGS: No active cough, wheezing.   PSYCH: Normal affect. Well groomed.           Video-Visit Details    Video Start Time: 10:46 AM    Type of service: Video Visit    Video End Time:10:53 AM    Originating Location (pt. Location): Home    Distant Location (provider location):  [facility] [place]     Platform used for Video Visit: [employer]  ..

## 2022-11-05 ENCOUNTER — HOSPITAL ENCOUNTER (EMERGENCY)
Facility: CLINIC | Age: 27
Discharge: HOME | End: 2022-11-06
Attending: EMERGENCY MEDICINE | Admitting: EMERGENCY MEDICINE
Payer: COMMERCIAL

## 2022-11-05 DIAGNOSIS — S20.229A CONTUSION OF BACK, UNSPECIFIED LATERALITY, INITIAL ENCOUNTER: ICD-10-CM

## 2022-11-05 DIAGNOSIS — S39.012A STRAIN OF LUMBAR REGION, INITIAL ENCOUNTER: ICD-10-CM

## 2022-11-05 DIAGNOSIS — S16.1XXA STRAIN OF NECK MUSCLE, INITIAL ENCOUNTER: ICD-10-CM

## 2022-11-05 DIAGNOSIS — S09.90XA CLOSED HEAD INJURY, INITIAL ENCOUNTER: ICD-10-CM

## 2022-11-05 PROCEDURE — 99285 EMERGENCY DEPT VISIT HI MDM: CPT | Mod: 25

## 2022-11-06 ENCOUNTER — APPOINTMENT (OUTPATIENT)
Dept: CT IMAGING | Facility: CLINIC | Age: 27
End: 2022-11-06
Attending: EMERGENCY MEDICINE
Payer: COMMERCIAL

## 2022-11-06 ENCOUNTER — HOSPITAL ENCOUNTER (EMERGENCY)
Facility: CLINIC | Age: 27
Discharge: HOME | End: 2022-11-07
Attending: EMERGENCY MEDICINE | Admitting: EMERGENCY MEDICINE
Payer: COMMERCIAL

## 2022-11-06 VITALS
OXYGEN SATURATION: 99 % | SYSTOLIC BLOOD PRESSURE: 119 MMHG | HEART RATE: 106 BPM | TEMPERATURE: 99.3 F | RESPIRATION RATE: 20 BRPM | DIASTOLIC BLOOD PRESSURE: 86 MMHG

## 2022-11-06 DIAGNOSIS — F41.0 ANXIETY ATTACK: ICD-10-CM

## 2022-11-06 LAB
ANION GAP SERPL CALCULATED.3IONS-SCNC: 15 MMOL/L (ref 7–15)
ANION GAP SERPL CALCULATED.3IONS-SCNC: 19 MMOL/L (ref 7–15)
BASOPHILS # BLD AUTO: 0 10E3/UL (ref 0–0.2)
BASOPHILS # BLD AUTO: 0 10E3/UL (ref 0–0.2)
BASOPHILS NFR BLD AUTO: 0 %
BASOPHILS NFR BLD AUTO: 1 %
BUN SERPL-MCNC: 5 MG/DL (ref 6–20)
BUN SERPL-MCNC: 5.3 MG/DL (ref 6–20)
CALCIUM SERPL-MCNC: 8.6 MG/DL (ref 8.6–10)
CALCIUM SERPL-MCNC: 9.3 MG/DL (ref 8.6–10)
CHLORIDE SERPL-SCNC: 101 MMOL/L (ref 98–107)
CHLORIDE SERPL-SCNC: 96 MMOL/L (ref 98–107)
CREAT SERPL-MCNC: 0.65 MG/DL (ref 0.51–0.95)
CREAT SERPL-MCNC: 0.75 MG/DL (ref 0.51–0.95)
DEPRECATED HCO3 PLAS-SCNC: 18 MMOL/L (ref 22–29)
DEPRECATED HCO3 PLAS-SCNC: 22 MMOL/L (ref 22–29)
EOSINOPHIL # BLD AUTO: 0 10E3/UL (ref 0–0.7)
EOSINOPHIL # BLD AUTO: 0 10E3/UL (ref 0–0.7)
EOSINOPHIL NFR BLD AUTO: 0 %
EOSINOPHIL NFR BLD AUTO: 0 %
ERYTHROCYTE [DISTWIDTH] IN BLOOD BY AUTOMATED COUNT: 15.3 % (ref 10–15)
ERYTHROCYTE [DISTWIDTH] IN BLOOD BY AUTOMATED COUNT: 15.5 % (ref 10–15)
GFR SERPL CREATININE-BSD FRML MDRD: >90 ML/MIN/1.73M2
GFR SERPL CREATININE-BSD FRML MDRD: >90 ML/MIN/1.73M2
GLUCOSE SERPL-MCNC: 100 MG/DL (ref 70–99)
GLUCOSE SERPL-MCNC: 89 MG/DL (ref 70–99)
HCG SER QL IA.RAPID: NEGATIVE
HCT VFR BLD AUTO: 39.9 % (ref 35–47)
HCT VFR BLD AUTO: 40.7 % (ref 35–47)
HGB BLD-MCNC: 13.2 G/DL (ref 11.7–15.7)
HGB BLD-MCNC: 13.5 G/DL (ref 11.7–15.7)
IMM GRANULOCYTES # BLD: 0 10E3/UL
IMM GRANULOCYTES # BLD: 0 10E3/UL
IMM GRANULOCYTES NFR BLD: 0 %
IMM GRANULOCYTES NFR BLD: 0 %
LYMPHOCYTES # BLD AUTO: 1.2 10E3/UL (ref 0.8–5.3)
LYMPHOCYTES # BLD AUTO: 1.7 10E3/UL (ref 0.8–5.3)
LYMPHOCYTES NFR BLD AUTO: 23 %
LYMPHOCYTES NFR BLD AUTO: 25 %
MCH RBC QN AUTO: 30.2 PG (ref 26.5–33)
MCH RBC QN AUTO: 30.7 PG (ref 26.5–33)
MCHC RBC AUTO-ENTMCNC: 33.1 G/DL (ref 31.5–36.5)
MCHC RBC AUTO-ENTMCNC: 33.2 G/DL (ref 31.5–36.5)
MCV RBC AUTO: 91 FL (ref 78–100)
MCV RBC AUTO: 93 FL (ref 78–100)
MONOCYTES # BLD AUTO: 0.2 10E3/UL (ref 0–1.3)
MONOCYTES # BLD AUTO: 0.4 10E3/UL (ref 0–1.3)
MONOCYTES NFR BLD AUTO: 4 %
MONOCYTES NFR BLD AUTO: 6 %
NEUTROPHILS # BLD AUTO: 3.8 10E3/UL (ref 1.6–8.3)
NEUTROPHILS # BLD AUTO: 4.6 10E3/UL (ref 1.6–8.3)
NEUTROPHILS NFR BLD AUTO: 69 %
NEUTROPHILS NFR BLD AUTO: 72 %
NRBC # BLD AUTO: 0 10E3/UL
NRBC # BLD AUTO: 0 10E3/UL
NRBC BLD AUTO-RTO: 0 /100
NRBC BLD AUTO-RTO: 0 /100
PLATELET # BLD AUTO: 282 10E3/UL (ref 150–450)
PLATELET # BLD AUTO: 332 10E3/UL (ref 150–450)
POTASSIUM SERPL-SCNC: 3.8 MMOL/L (ref 3.4–5.3)
POTASSIUM SERPL-SCNC: 3.9 MMOL/L (ref 3.4–5.3)
RBC # BLD AUTO: 4.37 10E6/UL (ref 3.8–5.2)
RBC # BLD AUTO: 4.4 10E6/UL (ref 3.8–5.2)
SODIUM SERPL-SCNC: 133 MMOL/L (ref 136–145)
SODIUM SERPL-SCNC: 138 MMOL/L (ref 136–145)
WBC # BLD AUTO: 5.2 10E3/UL (ref 4–11)
WBC # BLD AUTO: 6.7 10E3/UL (ref 4–11)

## 2022-11-06 PROCEDURE — 72131 CT LUMBAR SPINE W/O DYE: CPT

## 2022-11-06 PROCEDURE — 250N000009 HC RX 250: Performed by: EMERGENCY MEDICINE

## 2022-11-06 PROCEDURE — 250N000011 HC RX IP 250 OP 636: Performed by: EMERGENCY MEDICINE

## 2022-11-06 PROCEDURE — 36415 COLL VENOUS BLD VENIPUNCTURE: CPT | Performed by: EMERGENCY MEDICINE

## 2022-11-06 PROCEDURE — 70450 CT HEAD/BRAIN W/O DYE: CPT

## 2022-11-06 PROCEDURE — 93005 ELECTROCARDIOGRAM TRACING: CPT

## 2022-11-06 PROCEDURE — 99285 EMERGENCY DEPT VISIT HI MDM: CPT | Mod: 25

## 2022-11-06 PROCEDURE — 96374 THER/PROPH/DIAG INJ IV PUSH: CPT

## 2022-11-06 PROCEDURE — 72125 CT NECK SPINE W/O DYE: CPT

## 2022-11-06 PROCEDURE — 82310 ASSAY OF CALCIUM: CPT | Performed by: EMERGENCY MEDICINE

## 2022-11-06 PROCEDURE — 96375 TX/PRO/DX INJ NEW DRUG ADDON: CPT | Mod: 59

## 2022-11-06 PROCEDURE — 84702 CHORIONIC GONADOTROPIN TEST: CPT

## 2022-11-06 PROCEDURE — 85025 COMPLETE CBC W/AUTO DIFF WBC: CPT | Performed by: EMERGENCY MEDICINE

## 2022-11-06 PROCEDURE — 250N000013 HC RX MED GY IP 250 OP 250 PS 637: Performed by: EMERGENCY MEDICINE

## 2022-11-06 PROCEDURE — 80048 BASIC METABOLIC PNL TOTAL CA: CPT | Performed by: EMERGENCY MEDICINE

## 2022-11-06 PROCEDURE — 74177 CT ABD & PELVIS W/CONTRAST: CPT

## 2022-11-06 RX ORDER — LORAZEPAM 2 MG/ML
1 INJECTION INTRAMUSCULAR ONCE
Status: COMPLETED | OUTPATIENT
Start: 2022-11-06 | End: 2022-11-06

## 2022-11-06 RX ORDER — OXYCODONE HYDROCHLORIDE 5 MG/1
5 TABLET ORAL ONCE
Status: COMPLETED | OUTPATIENT
Start: 2022-11-06 | End: 2022-11-06

## 2022-11-06 RX ORDER — METHOCARBAMOL 750 MG/1
750 TABLET, FILM COATED ORAL 3 TIMES DAILY PRN
Qty: 30 TABLET | Refills: 0 | Status: SHIPPED | OUTPATIENT
Start: 2022-11-06 | End: 2023-07-28

## 2022-11-06 RX ORDER — LORAZEPAM 0.5 MG/1
1 TABLET ORAL ONCE
Status: DISCONTINUED | OUTPATIENT
Start: 2022-11-06 | End: 2022-11-06

## 2022-11-06 RX ORDER — METHOCARBAMOL 750 MG/1
750 TABLET, FILM COATED ORAL ONCE
Status: COMPLETED | OUTPATIENT
Start: 2022-11-06 | End: 2022-11-06

## 2022-11-06 RX ORDER — IOPAMIDOL 755 MG/ML
500 INJECTION, SOLUTION INTRAVASCULAR ONCE
Status: COMPLETED | OUTPATIENT
Start: 2022-11-06 | End: 2022-11-06

## 2022-11-06 RX ORDER — ONDANSETRON 2 MG/ML
4 INJECTION INTRAMUSCULAR; INTRAVENOUS EVERY 30 MIN PRN
Status: DISCONTINUED | OUTPATIENT
Start: 2022-11-06 | End: 2022-11-07 | Stop reason: HOSPADM

## 2022-11-06 RX ORDER — ACETAMINOPHEN 325 MG/1
650 TABLET ORAL ONCE
Status: COMPLETED | OUTPATIENT
Start: 2022-11-06 | End: 2022-11-06

## 2022-11-06 RX ADMIN — METHOCARBAMOL 750 MG: 750 TABLET ORAL at 01:19

## 2022-11-06 RX ADMIN — ACETAMINOPHEN 650 MG: 325 TABLET, FILM COATED ORAL at 01:26

## 2022-11-06 RX ADMIN — ONDANSETRON 4 MG: 2 INJECTION INTRAMUSCULAR; INTRAVENOUS at 22:33

## 2022-11-06 RX ADMIN — OXYCODONE HYDROCHLORIDE 5 MG: 5 TABLET ORAL at 01:19

## 2022-11-06 RX ADMIN — LORAZEPAM 1 MG: 2 INJECTION INTRAMUSCULAR; INTRAVENOUS at 22:33

## 2022-11-06 RX ADMIN — IOPAMIDOL 75 ML: 755 INJECTION, SOLUTION INTRAVENOUS at 02:19

## 2022-11-06 RX ADMIN — SODIUM CHLORIDE 59 ML: 9 INJECTION, SOLUTION INTRAVENOUS at 02:19

## 2022-11-06 ASSESSMENT — ACTIVITIES OF DAILY LIVING (ADL)
ADLS_ACUITY_SCORE: 35
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
## 2022-11-06 NOTE — DISCHARGE INSTRUCTIONS
Take ibuprofen 600 mg 3x per day as needed.  This will provide both pain control and fight against inflammation.   Take tylenol 1000mg 4x per day.  Do not take more than 4000mg tylenol in 24 hours.    You were given a prescription for Robaxin.  This is a muscle relaxant medication.  Use this as needed for additional pain control.  You were given a prescription for pain medication.  Use this if ibuprofen and robaxin are not working.        Discharge Instructions  Head Injury    You have been seen today for a head injury. Your evaluation included a history and physical examination. You may have had a CT (CAT) scan performed, though most head injuries do not require a scan. Based on this evaluation, your provider today does not feel that your head injury is serious.    Generally, every Emergency Department visit should have a follow-up clinic visit with either a primary or a specialty clinic/provider. Please follow-up as instructed by your emergency provider today.  Return to the Emergency Department if:  You are confused or you are not acting right.  Your headache gets worse or you start to have a really bad headache even with your recommended treatment plan.  You vomit (throw up) more than once.  You have a seizure.  You have trouble walking.  You have weakness or paralysis (cannot move) in an arm or a leg.  You have blood or fluid coming from your ears or nose.  You have new symptoms or anything that worries you.    Sleeping:  It is okay for you to sleep, but someone should wake you up if instructed by your provider, and someone should check on you at your usual time to wake up.     Activity:  Do not drive for at least 24 hours.  Do not drive if you have dizzy spells or trouble concentrating, or remembering things.  Do not return to any contact sports until cleared by your regular provider.     MORE INFORMATION:    Concussion:  A concussion is a minor head injury that may cause temporary problems with the way the  brain works. Although concussions are important, they are generally not an emergency or a reason that a person needs to be hospitalized. Some concussion symptoms include confusion, amnesia (forgetful), nausea (sick to your stomach) and vomiting (throwing up), dizziness, fatigue, memory or concentration problems, irritability and sleep problems. For most people, concussions are mild and temporary but some will have more severe and persistent symptoms that require on-going care and treatment.  CT Scans: Your evaluation today may have included a CT scan (CAT scan) to look for things like bleeding or a skull fracture (broken bone).  CT scans involve radiation and too many CT scans can cause serious health problems like cancer, especially in children.  Because of this, your provider may not have ordered a CT scan today if they think you are at low risk for a serious or life threatening problem.    If you were given a prescription for medicine here today, be sure to read all of the information (including the package insert) that comes with your prescription.  This will include important information about the medicine, its side effects, and any warnings that you need to know about.  The pharmacist who fills the prescription can provide more information and answer questions you may have about the medicine.  If you have questions or concerns that the pharmacist cannot address, please call or return to the Emergency Department.     Remember that you can always come back to the Emergency Department if you are not able to see your regular provider in the amount of time listed above, if you get any new symptoms, or if there is anything that worries you.    Discharge Instructions  Back Pain  You were seen today for back pain. Back pain can have many causes, but most will get better without surgery or other specific treatment. Sometimes there is a herniated ( slipped ) disc. We do not usually do MRI scans to look for these right  away, since most herniated discs will get better on their own with time.  Today, we did not find any evidence that your back pain was caused by a serious condition. However, sometimes symptoms develop over time and cannot be found during an emergency visit, so it is very important that you follow up with your primary provider.  Generally, every Emergency Department visit should have a follow-up clinic visit with either a primary or a specialty clinic/provider. Please follow-up as instructed by your emergency provider today.    Return to the Emergency Department if:  You develop a fever with your back pain.   You have weakness or change in sensation in one or both legs.  You lose control of your bowels or bladder, or cannot empty your bladder (cannot pee).  Your pain gets much worse.     Follow-up with your provider:  Unless your pain has completely gone away, please make an appointment with your provider within one week. Most of the routine care for back pain is available in a clinic and not the Emergency Department. You may need further management of your back pain, such as more pain medication, imaging such as an X-ray or MRI, or physical therapy.    What can I do to help myself?  Remain Active -- People are often afraid that they will hurt their back further or delay recovery by remaining active, but this is one of the best things you can do for your back. In fact, staying in bed for a long time to rest is not recommended. Studies have shown that people with low back pain recover faster when they remain active. Movement helps to bring blood flow to the muscles and relieve muscle spasms as well as preventing loss of muscle strength.  Heat -- Using a heating pad can help with low back pain during the first few weeks. Do not sleep with a heating pad, as you can be burned.   Pain medications - You may take a pain medication such as Tylenol  (acetaminophen), Advil , Motrin  (ibuprofen) or Aleve  (naproxen).  If you  were given a prescription for medicine here today, be sure to read all of the information (including the package insert) that comes with your prescription.  This will include important information about the medicine, its side effects, and any warnings that you need to know about.  The pharmacist who fills the prescription can provide more information and answer questions you may have about the medicine.  If you have questions or concerns that the pharmacist cannot address, please call or return to the Emergency Department.   Remember that you can always come back to the Emergency Department if you are not able to see your regular provider in the amount of time listed above, if you get any new symptoms, or if there is anything that worries you.

## 2022-11-07 VITALS
RESPIRATION RATE: 18 BRPM | HEART RATE: 97 BPM | OXYGEN SATURATION: 99 % | BODY MASS INDEX: 23.54 KG/M2 | WEIGHT: 150 LBS | TEMPERATURE: 97.1 F | SYSTOLIC BLOOD PRESSURE: 116 MMHG | DIASTOLIC BLOOD PRESSURE: 80 MMHG | HEIGHT: 67 IN

## 2022-11-07 LAB
ATRIAL RATE - MUSE: 89 BPM
DIASTOLIC BLOOD PRESSURE - MUSE: NORMAL MMHG
INTERPRETATION ECG - MUSE: NORMAL
P AXIS - MUSE: 49 DEGREES
PR INTERVAL - MUSE: 134 MS
QRS DURATION - MUSE: 80 MS
QT - MUSE: 406 MS
QTC - MUSE: 493 MS
R AXIS - MUSE: 41 DEGREES
SYSTOLIC BLOOD PRESSURE - MUSE: NORMAL MMHG
T AXIS - MUSE: 42 DEGREES
VENTRICULAR RATE- MUSE: 89 BPM

## 2022-11-07 PROCEDURE — 90791 PSYCH DIAGNOSTIC EVALUATION: CPT

## 2022-11-07 RX ORDER — LORAZEPAM 1 MG/1
1 TABLET ORAL EVERY 8 HOURS PRN
Qty: 6 TABLET | Refills: 0 | Status: SHIPPED | OUTPATIENT
Start: 2022-11-07 | End: 2023-07-28

## 2022-11-07 ASSESSMENT — ACTIVITIES OF DAILY LIVING (ADL): ADLS_ACUITY_SCORE: 35
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
## 2022-11-07 NOTE — DISCHARGE INSTRUCTIONS
Aftercare Plan    Date: Wednesday, 11/9/2022  Time: 3:00 pm - 4:00 pm  Provider: Carolyn Chadwick  Supervised by: Amisha Reyes MS  LMFT  Location: [facility], [address] [place] [address]  Phone: [phone]  Type: Therapy - Initial (In-Person)    Patient Instructions  To reduce no shows, we ask that patients call our office at [phone] and confirm their appointment and leave their email. We make effort to reach each client, but if we cannot reach them or they do not confirm appointment, the appointment will be removed. Please ask patients to leave a voicemail with their information.      Date: Tuesday, 11/22/2022  Time: 3:30 pm - 4:30 pm  Provider: Seema FOX  CNP  Location: [facility], [address] [place] [address] [address], [place] [address]  Phone: [phone]  Type: Medication Mgmt - Initial (In-Person)     Patient Instructions  Please arrive 15 minutes before the scheduled appointment and bring your DL/ID and insurance card.      If I am feeling unsafe or I am in a crisis, I will:   Contact my established care providers   Call the National Suicide Prevention Lifeline: 773.151.4379   Go to the nearest emergency room   Call 911     Warning signs that I or other people might notice when a crisis is developing for me:     I am having increasing suicidal thoughts that turn to plans with intent or means  I am having additional urges to self-harm    My emotions are of hopelessness; feeling like there's no way out.  Rage or anger.  Engaging in risky activities without thinking  Withdrawing from family/friends  Dramatic mood swings  Drastic personality changes   Use of alcohol or drugs  Postings on social media  Neglect of personal hygiene or cares     Things I am able to do on my own to cope or help me feel better:    Spending quality time with loved ones  Staying hydrated  Eating balanced meals  Going for a walk every day  Take care of daily  responsibilities/needs  Focus on positive self-talk vs negative self-talk    Things that I am able to do with others to cope or help me better:   Exercise  Music  Deep breathing  Meditations  Journal  Self-regulate  Self check-in  Ask for help    Things I can use or do for distraction:   Reach out to/spend time with family, friends  Shower  Exercise  Chores or do a project  Listen to music  Watch movie/TV  Listening to music  Journaling  Reading a book  Meditating  Call a friend    Changes I can make to support my mental health and wellness:    -I will abstain from all mood altering chemicals not currently prescribed to me   -I will attend scheduled mental health therapy and psychiatric appointments and follow all   recommendations  -I will commit to 30 minutes of self care daily - this can be as simple as taking a shower, going for a   walk, cooking a meal, read, writing, etc  -I will practice square breathing when I begin to feel anxious - in breath through the nose for the count   of 4 and the first line on the square. Out breath through the mouth for the count of 4 for the second line   of the square. Repeat to complete the square. Repeat the square as many times as needed.  - I will use distraction skills of: going for walks, watching TV, spending time outside, calling a friend or   family member  -Use community resources, including hotline numbers, [facility] crisis and support meetings  -Maintain a daily schedule/routine  -Practice deep breathing skills  -Download a meditation michelle and spend 15-20 minutes per day mediating/relaxing. Some apps to   download include: Calm, Headspace and Insight Timer. All 3 of these apps have free version    Reduce Extreme Emotion  QUICKLY:  Changing Your Body Chemistry      T:  Change your body Temperature to change your autonomic nervous system   Use Ice Water to calm yourself down FAST   Put your face in a bowl of ice water (this is the best way; have the person keep his/her face  in ice water for 30-45 seconds - initial research is showing that the longer s/he can hold her/his face in the water, the better the response), or   Splash ice water on your face, or hold an ice pack on your face      I:  Intensely exercise to calm down a body revved up by emotion   Examples: running, walking fast, jumping, playing basketball, weight lifting, swimming, calisthenics, etc.   Engage in exercises that DO NOT include violent behaviors. Exercises that utilize violent behaviors tend to function as  behavioral rehearsal,  and rather than calming the person down, may actually  rev  the person up more, increasing the likelihood of violence, and lessening the likelihood that they will  burn off  energy     P:  Progressively relax your muscles   Starting with your hands, moving to your forearms, upper arms, shoulders, neck, forehead, eyes, cheeks and lips, tongue and teeth, chest, upper back, stomach, buttocks, thighs, calves, ankles, feet   Tense (10 seconds,   of the way), then relax each muscle (all the way)   Notice the tension   Notice the difference when relaxed (by tensing first, and then relaxing, you are able to get a more thorough relaxation than by simply relaxing)      P: Paced breathing to relax   The standard technique is to begin with counting the number of steps one takes for a typical inhale, then counting the steps one takes for a typical exhale, and then lengthening the amount of steps for the exhalation by one or two steps.  OR  Repeat this pattern for 1-2 minutes  Inhale for four (4) seconds   Exhale for six (6) to eight (8) seconds   Research demonstrated that one can change one's overall level of anxiety by doing this exercise for even a few minutes per day      People in my life that I can ask for help:   Family  Friends  Providers    Your Critical access hospital has a mental health crisis team you can call 24/7:   [facility] Crisis Line Number: [phone]  [facility] Mental Health Crisis:  [phone] - Call the crisis line for immediate mental health support, 24 hours a day.   [facility] Crisis Line Number: [phone]  [facility] Crisis Line Number: [phone]  [facility] Crisis Line Number: [phone]   [facility] Crisis Line Number: [phone]  [place]: [phone]  [place]: [phone]  [facility] Crisis Number: [phone]  [facility] Crisis: [phone]    [facility]  - Crisis Bed   [employer]   Raisa Page   [address] [address]   [phone]     [facility] Crisis Stabilization Program   [address] [place], [place] [address]  Phone:[phone]     [facility] - Crisis Bed   [employer]   June Tamayo [facility]   [address] [place] [address]   [phone]     [facility]  - Crisis Bed   [facility]   [address]?[address]    [phone]    [facility] -Crisis Beds   [facility]   [address] [place] [address]   [employer] at [facility].?    [phone] or [phone]    [facility] -Crisis Beds   [facility]   [address] [place] [address]   [phone]     [facility]-Crisis Beds   [facility]   [place]  [phone]      [facility]-Crisis Beds    [facility]  [phone]     [facility]risis Beds   [facility]   [address] [place], [place] [address] [phone]    [facility]-Crisis Beds    [facility]   [address] [place] [address] \  [phone]       For shelter tonight, start with Adult Shelter Connect Overnight Shelter: [phone]  *Phone lines are closed between 5:30-7:30 pm    [facility]  [address] [place] [address] (enter on the [address] side near the [address] corner)  Walk-in Hours: 9 am - 5:30 pm Monday-Friday and 1 pm - 5:30 pm Saturday and  Sunday    [facility]  [phone]  [address] [facility]    [facility]  [phone]  [address] [facility]    [facility]  [phone]  [address] [facility]      [facility]  [phone]  [address] [facility]  [phone]  [address] [place] [address]    To start making a plan for a more long-term solution, contact the Coordinated Entry Homeless Assistance Program:    Coordinated Entry Homeless Assistance  [facility]  [address] [place] [address]  [phone]  Open Wednesdays and Thursdays 8 am-2 pm  The Coordinated Entry System is the [facility]'s approach to organizing and providing housing services for people experiencing homelessness in [facility].  Because housing resources are limited, this process is designed to ensure that individuals and families with the highest vulnerability, service needs, and length of homelessness receive top priority in housing placement.    Assessment changes due to COVID-19 virus    [facility] family assessors will now be conducting assessments by phone. If you are working with a family staying in a place other than a [facility] shelter and is eligible for Coordinated Entry, continue to contact [facility]  at [phone] to set up an assessment.    For single adults, [facility] will be suspending drop-in hours at [facility]. To have a Coordinated Entry assessment completed, call the [facility]' certified assessors: Eduardo at [phone] or Nohemy at [phone] directly to set up a time to meet    Call 211 at any time on any day for questions about services and resources available to you.      Family Shelters    [facility] Shelter Team  [phone]  [address] [facility], Floors 5 & 6              Youth, families & disabled adults    Hours: Mon-Fri 8:00 am-4:30  pm.  After-Hours Shelter Team  [address]      Drop-Ins    [facility]  [phone]  [address] ([facility])              Showers/Laundry (8-11am)              Health Care              Employment Services              Breakfast (7-8 am)              Lunch (11:30am-12:30pm)    Hours: Mon-Sat: Summer 7am-3pm; Winter 7am-4pm.      [facility] [phone]  [address] [place] [address]  Hours: Mon, Wed and Fri 9:00-11:30 am      Clothing    [facility]  [phone]  [address]  Hours: M-Th 10-11:30am & 1:30-3:30pm; Sat/Sun 9:30-10:30 am      Free Meals & Showers    [facility]  [phone]  [address] [place] [address]  Dinner: Mon-Fri 5:30-6:30pm (No showers)    [facility]  [phone]  Meals ([address]): Women & Children M-F 8:30-9am; Everyone: M-F 12-1pm; Sat/Sun, 10:30-11:30 am  Showers ([address]): Mon-Fri 9-10 am    [facility] [employer] [facility]  [phone]  [address]  Dinner: Thurs - Mon 6 pm  Showers: Daily 8 - 4:30 pm    Health Care    [facility]  [phone]  Clinics are in [address] [place] shelters/drop-in centers.  Hours: Mon-Fri at varying sites. Call for sites/times. No insurance or appts required to receive care.  Clinic sites: [facility], [employer] [facility], [facility], [facility], [facility], [facility], [facility], [facility], [facility], [facility]      Other things that are important when I'm in crisis:   Ask for help    Additional resources and information:     Mental Health Apps  My3  [email or web address]://[email or web address] Grouppp.[email or web address]/    VirtualHopeBox  https://[employer].org/apps/virtual-hope-box/       Professionals or Agencies I Can Contact During A Crisis:       Crisis Lines  Crisis Text Line  Text 389864  You will be connected with a trained live crisis counselor to provide support.    The [employer]za Project (LGBTQ Youth Crisis Line)   "4.897.525.0719  text START to 844-647    National Kismet on Mental Illness (MIGUEL ÁNGEL)  871.225.5179 or 6.388.MIGUEL ÁNGEL.HELPS    National Suicide Prevention Lifeline at 5-607-761-XIQH (4536)     Throughout  [place]: call **CRISIS (**680124)     Crisis Text Line: is available for free, 24/7 by texting [place] to 436176    Community Resources  Fast Tracker  Linking people to mental health and substance use disorder resources  Restalo.Fix That Bug     [place] Mental Health Warm Line  Peer to peer support  Monday thru Saturday, 12 pm to 10 pm  828.177.0008 or 7.725.058.5035  Text \"Support\" to 96747     National Kismet on Mental Illness (www.[place].miguel ángel.org): 322-295-0164 or 796-499-7177     [facility] Phone (free remote counseling): [phone] Web address:   https://InteKrin.org/     www.Cold Crate (filter for insurance, gender preference, etc.)    [facility]   [phone]  Intake appointment will be virtual, following appointments can be in person or virtual.   **IMMEDIATE OPENINGS**    [facility]  [phone]  *offers individual therapy, medication management and Mental Health Case Workers; can self refer    [facility]  [phone]  *Immediate Openings    [facility]  [phone]  *Immediate Openings    Please follow up with scheduled providers to ensure all necessary paperwork is filled out prior to your   scheduled telehealth appointments.     Coordinators from [facility] will be calling within two business days to ensure   that you have the resources you may need or provide assistance with scheduling (Phone number: [phone].).    Remember: give the referrals 3 sessions prior to calling it quits. Do you trust them? Do you feel   understood? Do you think they can help? Check in with yourself after each session    Please reach out to the [facility]([phone]) regarding further mental " health appointment needs for this emergency department visit.    [facility] SCHEDULING:  Today you were seen by a licensed mental health professional through [facility] and Transition sevices, [facility] ([facility])  for a crisis assessment in the Emergency Department at [facility].  It is recommended that you follow up with your estabished providers (psychiatrist, menta health therapist, and/or primary care doctor - as relevant) as soon as possible. Coordinators from [facility] will be calling you in the next 24-48 hours to ensure that you have the resources you need.  You can so contact [facility] coordinators directly at [phone].     [facility] maintains an extensive network of licensed behavioral health providers to connect patients with the services they need.  We do not charge providers a fee to participate in our referral network.  We match patients with providers based on a patient s specific needs, insurance coverage, and location.  Our first effort will be to refer you to a provider within your care system, and will utilize providers outside your care system as needed.     Additional information  Today you were seen by a licensed mental health professional through Triage and Transition services, [facility] ([facility])  for a crisis assessment in the Emergency Department at [facility].  It is recommended that you follow up with your established providers (psychiatrist, mental health therapist, and/or primary care doctor - as relevant) as soon as possible. Coordinators from [facility] will be calling you in the next 24-48 hours to ensure that you have the resources you need.  You can also contact [facility] coordinators directly at [phone]. You may have been scheduled for or offered an appointment with a mental health provider. [facility] maintains an extensive network of licensed behavioral health providers to connect patients with the services they need.  We do not charge providers a fee to participate in  our referral network.  We match patients with providers based on a patient's specific needs, insurance coverage, and location.  Our first effort will be to refer you to a provider within your care system, and will utilize providers outside your care system as needed.      ATIVAN ONLY FOR ANXIETY ATTACK  PLEASE FOLLOW UP WITH REFERRED PROVIDER
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
## 2022-11-14 ENCOUNTER — VIRTUAL VISIT (OUTPATIENT)
Dept: FAMILY MEDICINE | Facility: CLINIC | Age: 27
End: 2022-11-14
Payer: COMMERCIAL

## 2022-11-14 DIAGNOSIS — S09.90XD CLOSED HEAD INJURY, SUBSEQUENT ENCOUNTER: Primary | ICD-10-CM

## 2022-11-14 DIAGNOSIS — S06.0X9D CONCUSSION WITH LOSS OF CONSCIOUSNESS, SUBSEQUENT ENCOUNTER: ICD-10-CM

## 2022-11-14 PROCEDURE — 99213 OFFICE O/P EST LOW 20 MIN: CPT | Mod: 95 | Performed by: PHYSICIAN ASSISTANT

## 2022-11-14 ASSESSMENT — ANXIETY QUESTIONNAIRES
GAD7 TOTAL SCORE: 21
3. WORRYING TOO MUCH ABOUT DIFFERENT THINGS: NEARLY EVERY DAY
2. NOT BEING ABLE TO STOP OR CONTROL WORRYING: NEARLY EVERY DAY
5. BEING SO RESTLESS THAT IT IS HARD TO SIT STILL: NEARLY EVERY DAY
7. FEELING AFRAID AS IF SOMETHING AWFUL MIGHT HAPPEN: NEARLY EVERY DAY
GAD7 TOTAL SCORE: 21
8. IF YOU CHECKED OFF ANY PROBLEMS, HOW DIFFICULT HAVE THESE MADE IT FOR YOU TO DO YOUR WORK, TAKE CARE OF THINGS AT HOME, OR GET ALONG WITH OTHER PEOPLE?: EXTREMELY DIFFICULT
GAD7 TOTAL SCORE: 21
7. FEELING AFRAID AS IF SOMETHING AWFUL MIGHT HAPPEN: NEARLY EVERY DAY
1. FEELING NERVOUS, ANXIOUS, OR ON EDGE: NEARLY EVERY DAY
6. BECOMING EASILY ANNOYED OR IRRITABLE: NEARLY EVERY DAY
IF YOU CHECKED OFF ANY PROBLEMS ON THIS QUESTIONNAIRE, HOW DIFFICULT HAVE THESE PROBLEMS MADE IT FOR YOU TO DO YOUR WORK, TAKE CARE OF THINGS AT HOME, OR GET ALONG WITH OTHER PEOPLE: EXTREMELY DIFFICULT
4. TROUBLE RELAXING: NEARLY EVERY DAY

## 2022-11-14 ASSESSMENT — PATIENT HEALTH QUESTIONNAIRE - PHQ9
SUM OF ALL RESPONSES TO PHQ QUESTIONS 1-9: 22
10. IF YOU CHECKED OFF ANY PROBLEMS, HOW DIFFICULT HAVE THESE PROBLEMS MADE IT FOR YOU TO DO YOUR WORK, TAKE CARE OF THINGS AT HOME, OR GET ALONG WITH OTHER PEOPLE: EXTREMELY DIFFICULT
SUM OF ALL RESPONSES TO PHQ QUESTIONS 1-9: 22

## 2022-11-14 NOTE — LETTER
November 14, 2022      Priscila Betancourt  [address] [facility] [address]        To Whom It May Concern:    Priscila Betancourt had a visit on November 14, 2022 .  She is able to return to work without restrictions at this time.      Sincerely,        Naima Norman PA-C
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
## 2023-03-15 DIAGNOSIS — J44.89 CHRONIC OBSTRUCTIVE AIRWAY DISEASE WITH ASTHMA (H): ICD-10-CM

## 2023-03-16 RX ORDER — ALBUTEROL SULFATE 90 UG/1
AEROSOL, METERED RESPIRATORY (INHALATION)
Qty: 18 G | Refills: 0 | OUTPATIENT
Start: 2023-03-16

## 2023-03-16 NOTE — TELEPHONE ENCOUNTER
How Severe Is Your Skin Lesion?: moderate Patient last seen in 2021    MELANIE   Have Your Skin Lesions Been Treated?: not been treated Is This A New Presentation, Or A Follow-Up?: Skin Lesions

## 2023-03-16 NOTE — TELEPHONE ENCOUNTER
Left message to call back. Scheduling line [phone]. Call triage [phone] if questions. We will also send a detailed message in [employer].  Sent.   Rocco Herrera RN - Patient Advocate and Liaison (PAL)  [facility]   [phone]

## 2023-03-16 NOTE — TELEPHONE ENCOUNTER
Pt returns call. Relayed provider message below.     Pt informs her asthma has been worsening lately and she needs albuterol inhaler refilled.     Scheduled pt for tomorrow 3/17/23 with Matilde Adame PA-C for asthma/albuterol inhaler refill. Pt informs she will schedule appt for physical/establish care appt with new PCP at visit tomorrow. Informed pt that Matilde Adame PA-C is a same day extender in clinic and she will need to establish with a PCP taking new patients.    Patient was given an opportunity to ask questions, verbalized understanding of plan, and is agreeable.    Margi Gaytan RN

## 2023-03-16 NOTE — TELEPHONE ENCOUNTER
Routing refill request to provider for review/approval because:  Patient needs to be seen because:  Due for appt  ACT abnormal and outdated    Melanie Montana RN, BSN  [facility]

## 2023-03-17 ENCOUNTER — OFFICE VISIT (OUTPATIENT)
Dept: FAMILY MEDICINE | Facility: CLINIC | Age: 28
End: 2023-03-17
Payer: COMMERCIAL

## 2023-03-17 VITALS
HEART RATE: 98 BPM | BODY MASS INDEX: 26.06 KG/M2 | DIASTOLIC BLOOD PRESSURE: 56 MMHG | RESPIRATION RATE: 14 BRPM | HEIGHT: 67 IN | TEMPERATURE: 98.3 F | WEIGHT: 166 LBS | SYSTOLIC BLOOD PRESSURE: 110 MMHG | OXYGEN SATURATION: 100 %

## 2023-03-17 DIAGNOSIS — J45.40 MODERATE PERSISTENT ASTHMA WITHOUT COMPLICATION: Primary | ICD-10-CM

## 2023-03-17 DIAGNOSIS — J44.89 CHRONIC OBSTRUCTIVE AIRWAY DISEASE WITH ASTHMA (H): ICD-10-CM

## 2023-03-17 DIAGNOSIS — F43.25 ADJUSTMENT DISORDER WITH MIXED DISTURBANCE OF EMOTIONS AND CONDUCT: ICD-10-CM

## 2023-03-17 PROCEDURE — 99213 OFFICE O/P EST LOW 20 MIN: CPT | Performed by: PHYSICIAN ASSISTANT

## 2023-03-17 RX ORDER — BUDESONIDE AND FORMOTEROL FUMARATE DIHYDRATE 160; 4.5 UG/1; UG/1
2 AEROSOL RESPIRATORY (INHALATION) 3 TIMES DAILY
Qty: 10.2 G | Refills: 1 | Status: SHIPPED | OUTPATIENT
Start: 2023-03-17 | End: 2024-01-23

## 2023-03-17 RX ORDER — FLUTICASONE PROPIONATE 50 MCG
SPRAY, SUSPENSION (ML) NASAL
Qty: 16 G | Refills: 1 | Status: SHIPPED | OUTPATIENT
Start: 2023-03-17 | End: 2023-10-06

## 2023-03-17 RX ORDER — ALBUTEROL SULFATE 90 UG/1
AEROSOL, METERED RESPIRATORY (INHALATION)
Qty: 18 G | Refills: 1 | Status: SHIPPED | OUTPATIENT
Start: 2023-03-17 | End: 2023-07-25

## 2023-03-17 ASSESSMENT — ANXIETY QUESTIONNAIRES
2. NOT BEING ABLE TO STOP OR CONTROL WORRYING: NEARLY EVERY DAY
5. BEING SO RESTLESS THAT IT IS HARD TO SIT STILL: NEARLY EVERY DAY
GAD7 TOTAL SCORE: 21
8. IF YOU CHECKED OFF ANY PROBLEMS, HOW DIFFICULT HAVE THESE MADE IT FOR YOU TO DO YOUR WORK, TAKE CARE OF THINGS AT HOME, OR GET ALONG WITH OTHER PEOPLE?: EXTREMELY DIFFICULT
1. FEELING NERVOUS, ANXIOUS, OR ON EDGE: NEARLY EVERY DAY
GAD7 TOTAL SCORE: 21
GAD7 TOTAL SCORE: 21
7. FEELING AFRAID AS IF SOMETHING AWFUL MIGHT HAPPEN: NEARLY EVERY DAY
IF YOU CHECKED OFF ANY PROBLEMS ON THIS QUESTIONNAIRE, HOW DIFFICULT HAVE THESE PROBLEMS MADE IT FOR YOU TO DO YOUR WORK, TAKE CARE OF THINGS AT HOME, OR GET ALONG WITH OTHER PEOPLE: EXTREMELY DIFFICULT
7. FEELING AFRAID AS IF SOMETHING AWFUL MIGHT HAPPEN: NEARLY EVERY DAY
6. BECOMING EASILY ANNOYED OR IRRITABLE: NEARLY EVERY DAY
3. WORRYING TOO MUCH ABOUT DIFFERENT THINGS: NEARLY EVERY DAY
4. TROUBLE RELAXING: NEARLY EVERY DAY

## 2023-03-17 ASSESSMENT — PATIENT HEALTH QUESTIONNAIRE - PHQ9
SUM OF ALL RESPONSES TO PHQ QUESTIONS 1-9: 23
10. IF YOU CHECKED OFF ANY PROBLEMS, HOW DIFFICULT HAVE THESE PROBLEMS MADE IT FOR YOU TO DO YOUR WORK, TAKE CARE OF THINGS AT HOME, OR GET ALONG WITH OTHER PEOPLE: EXTREMELY DIFFICULT
SUM OF ALL RESPONSES TO PHQ QUESTIONS 1-9: 23

## 2023-03-17 ASSESSMENT — ASTHMA QUESTIONNAIRES: ACT_TOTALSCORE: 12
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
## 2023-04-23 ENCOUNTER — HEALTH MAINTENANCE LETTER (OUTPATIENT)
Age: 28
End: 2023-04-23

## 2023-05-12 NOTE — PROGRESS NOTES
Nexplanon Removal:     Is a pregnancy test required: No.  Was a consent obtained?  Yes    Priscila Betancourt is here for removal of etonogestrel implant Nexplanon/Implanon    Indication: Irregular bleeding.  Has had in since 10/2019 and does not like irregular, infrequent bleeding pattern.  Desires to return to use of Depo Provera.  Explained they have very similar pharmacology but she tolerated the Depo better in the past and strongly desires to return to its use      Preoperative Diagnosis: etonogestrel implant  Postoperative Diagnosis: etonogestrel implant removed    Technique: On the right arm  Skin prep Betadine  Anesthesia 1% lidocaine  Procedure: Small incision (<5mm) was made at distal end of palpable implant, curved hemostat or mosquito forceps was used to isolate the implant and bring it to the incision, the fibrous capsule containing the implant  was incised and the Implant was removed intact.        EBL: minimal  Complications:  No  Tolerance:  Pt tolerated procedure well and was in stable condition.   Dressing:    A pressure bandage was placed for the next 12-24 hours.    Contraception was discussed and patient chose the following method depoprovera which was administered today      Follow up: Pt was instructed to call if bleeding, severe pain or foul smell.     Donnell Weiss MD   normal/cranial nerves II-XII intact/sensation intact

## 2023-05-17 ENCOUNTER — TRANSFERRED RECORDS (OUTPATIENT)
Dept: HEALTH INFORMATION MANAGEMENT | Facility: CLINIC | Age: 28
End: 2023-05-17
Payer: COMMERCIAL

## 2023-06-21 ENCOUNTER — LAB (OUTPATIENT)
Dept: LAB | Facility: CLINIC | Age: 28
End: 2023-06-21
Payer: COMMERCIAL

## 2023-06-21 DIAGNOSIS — B20 HUMAN IMMUNODEFICIENCY VIRUS (HIV) DISEASE (H): ICD-10-CM

## 2023-06-21 LAB
ALBUMIN SERPL BCG-MCNC: 4.1 G/DL (ref 3.5–5.2)
ALP SERPL-CCNC: 60 U/L (ref 35–104)
ALT SERPL W P-5'-P-CCNC: 17 U/L (ref 0–50)
ANION GAP SERPL CALCULATED.3IONS-SCNC: 10 MMOL/L (ref 7–15)
AST SERPL W P-5'-P-CCNC: 25 U/L (ref 0–45)
BASOPHILS # BLD AUTO: 0 10E3/UL (ref 0–0.2)
BASOPHILS NFR BLD AUTO: 0 %
BILIRUB SERPL-MCNC: 0.3 MG/DL
BUN SERPL-MCNC: 7 MG/DL (ref 6–20)
CALCIUM SERPL-MCNC: 9.1 MG/DL (ref 8.6–10)
CHLORIDE SERPL-SCNC: 96 MMOL/L (ref 98–107)
CREAT SERPL-MCNC: 0.66 MG/DL (ref 0.51–0.95)
DEPRECATED HCO3 PLAS-SCNC: 25 MMOL/L (ref 22–29)
EOSINOPHIL # BLD AUTO: 0.1 10E3/UL (ref 0–0.7)
EOSINOPHIL NFR BLD AUTO: 2 %
ERYTHROCYTE [DISTWIDTH] IN BLOOD BY AUTOMATED COUNT: 15.3 % (ref 10–15)
GFR SERPL CREATININE-BSD FRML MDRD: >90 ML/MIN/1.73M2
GLUCOSE SERPL-MCNC: 74 MG/DL (ref 70–99)
HCT VFR BLD AUTO: 34.7 % (ref 35–47)
HGB BLD-MCNC: 11.5 G/DL (ref 11.7–15.7)
IMM GRANULOCYTES # BLD: 0 10E3/UL
IMM GRANULOCYTES NFR BLD: 0 %
LYMPHOCYTES # BLD AUTO: 1.9 10E3/UL (ref 0.8–5.3)
LYMPHOCYTES NFR BLD AUTO: 42 %
MCH RBC QN AUTO: 31.9 PG (ref 26.5–33)
MCHC RBC AUTO-ENTMCNC: 33.1 G/DL (ref 31.5–36.5)
MCV RBC AUTO: 96 FL (ref 78–100)
MONOCYTES # BLD AUTO: 0.4 10E3/UL (ref 0–1.3)
MONOCYTES NFR BLD AUTO: 9 %
NEUTROPHILS # BLD AUTO: 2.1 10E3/UL (ref 1.6–8.3)
NEUTROPHILS NFR BLD AUTO: 47 %
NRBC # BLD AUTO: 0 10E3/UL
NRBC BLD AUTO-RTO: 0 /100
PLATELET # BLD AUTO: 206 10E3/UL (ref 150–450)
POTASSIUM SERPL-SCNC: 3.8 MMOL/L (ref 3.4–5.3)
PROT SERPL-MCNC: 7.3 G/DL (ref 6.4–8.3)
RBC # BLD AUTO: 3.61 10E6/UL (ref 3.8–5.2)
SODIUM SERPL-SCNC: 131 MMOL/L (ref 136–145)
WBC # BLD AUTO: 4.5 10E3/UL (ref 4–11)

## 2023-06-21 PROCEDURE — 86355 B CELLS TOTAL COUNT: CPT

## 2023-06-21 PROCEDURE — 36415 COLL VENOUS BLD VENIPUNCTURE: CPT

## 2023-06-21 PROCEDURE — 84999 UNLISTED CHEMISTRY PROCEDURE: CPT

## 2023-06-21 PROCEDURE — 87539 HIV-2 QUANT&REVRSE TRNSCRIPJ: CPT

## 2023-06-21 PROCEDURE — 85025 COMPLETE CBC W/AUTO DIFF WBC: CPT

## 2023-06-21 PROCEDURE — 87536 HIV-1 QUANT&REVRSE TRNSCRPJ: CPT

## 2023-06-21 PROCEDURE — 80053 COMPREHEN METABOLIC PANEL: CPT

## 2023-06-22 LAB
CD19 CELLS # BLD: 106 CELLS/UL (ref 107–698)
CD19 CELLS NFR BLD: 5 % (ref 6–27)
CD3 CELLS # BLD: 1823 CELLS/UL (ref 603–2990)
CD3 CELLS NFR BLD: 87 % (ref 49–84)
CD3+CD4+ CELLS # BLD: 837 CELLS/UL (ref 441–2156)
CD3+CD4+ CELLS NFR BLD: 40 % (ref 28–63)
CD3+CD4+ CELLS/CD3+CD8+ CLL BLD: 0.88 % (ref 1.4–2.6)
CD3+CD8+ CELLS # BLD: 956 CELLS/UL (ref 125–1312)
CD3+CD8+ CELLS NFR BLD: 46 % (ref 10–40)
CD3-CD16+CD56+ CELLS # BLD: 157 CELLS/UL (ref 95–640)
CD3-CD16+CD56+ CELLS NFR BLD: 7 % (ref 4–25)
HIV1 RNA # PLAS NAA DL=20: 294 COPIES/ML
HIV1 RNA SERPL NAA+PROBE-LOG#: 2.5 {LOG_COPIES}/ML
T CELL EXTENDED COMMENT: ABNORMAL

## 2023-07-15 ENCOUNTER — HEALTH MAINTENANCE LETTER (OUTPATIENT)
Age: 28
End: 2023-07-15

## 2023-07-17 LAB — SCANNED LAB RESULT: NORMAL

## 2023-07-25 ENCOUNTER — APPOINTMENT (OUTPATIENT)
Dept: CT IMAGING | Facility: CLINIC | Age: 28
End: 2023-07-25
Attending: EMERGENCY MEDICINE
Payer: COMMERCIAL

## 2023-07-25 ENCOUNTER — HOSPITAL ENCOUNTER (EMERGENCY)
Facility: CLINIC | Age: 28
Discharge: HOME | End: 2023-07-25
Attending: EMERGENCY MEDICINE | Admitting: EMERGENCY MEDICINE
Payer: COMMERCIAL

## 2023-07-25 VITALS
SYSTOLIC BLOOD PRESSURE: 112 MMHG | RESPIRATION RATE: 20 BRPM | OXYGEN SATURATION: 100 % | TEMPERATURE: 98.3 F | DIASTOLIC BLOOD PRESSURE: 74 MMHG | HEART RATE: 85 BPM

## 2023-07-25 DIAGNOSIS — J40 BRONCHITIS: ICD-10-CM

## 2023-07-25 DIAGNOSIS — E83.42 HYPOMAGNESEMIA: ICD-10-CM

## 2023-07-25 LAB
ANION GAP SERPL CALCULATED.3IONS-SCNC: 22 MMOL/L (ref 7–15)
BASOPHILS # BLD AUTO: 0 10E3/UL (ref 0–0.2)
BASOPHILS NFR BLD AUTO: 0 %
BUN SERPL-MCNC: 4.5 MG/DL (ref 6–20)
CALCIUM SERPL-MCNC: 9.7 MG/DL (ref 8.6–10)
CHLORIDE SERPL-SCNC: 95 MMOL/L (ref 98–107)
CREAT SERPL-MCNC: 0.69 MG/DL (ref 0.51–0.95)
D DIMER PPP FEU-MCNC: 1.34 UG/ML FEU (ref 0–0.5)
DEPRECATED HCO3 PLAS-SCNC: 18 MMOL/L (ref 22–29)
EOSINOPHIL # BLD AUTO: 0 10E3/UL (ref 0–0.7)
EOSINOPHIL NFR BLD AUTO: 0 %
ERYTHROCYTE [DISTWIDTH] IN BLOOD BY AUTOMATED COUNT: 15.9 % (ref 10–15)
GFR SERPL CREATININE-BSD FRML MDRD: >90 ML/MIN/1.73M2
GLUCOSE BLDC GLUCOMTR-MCNC: 84 MG/DL (ref 70–99)
GLUCOSE SERPL-MCNC: 83 MG/DL (ref 70–99)
HCT VFR BLD AUTO: 37.9 % (ref 35–47)
HGB BLD-MCNC: 13.1 G/DL (ref 11.7–15.7)
HOLD SPECIMEN: NORMAL
IMM GRANULOCYTES # BLD: 0 10E3/UL
IMM GRANULOCYTES NFR BLD: 0 %
LYMPHOCYTES # BLD AUTO: 1.7 10E3/UL (ref 0.8–5.3)
LYMPHOCYTES NFR BLD AUTO: 25 %
MAGNESIUM SERPL-MCNC: 1.4 MG/DL (ref 1.7–2.3)
MCH RBC QN AUTO: 30.8 PG (ref 26.5–33)
MCHC RBC AUTO-ENTMCNC: 34.6 G/DL (ref 31.5–36.5)
MCV RBC AUTO: 89 FL (ref 78–100)
MONOCYTES # BLD AUTO: 0.3 10E3/UL (ref 0–1.3)
MONOCYTES NFR BLD AUTO: 5 %
NEUTROPHILS # BLD AUTO: 4.7 10E3/UL (ref 1.6–8.3)
NEUTROPHILS NFR BLD AUTO: 70 %
NRBC # BLD AUTO: 0 10E3/UL
NRBC BLD AUTO-RTO: 0 /100
PLATELET # BLD AUTO: 273 10E3/UL (ref 150–450)
POTASSIUM SERPL-SCNC: 3.9 MMOL/L (ref 3.4–5.3)
RBC # BLD AUTO: 4.25 10E6/UL (ref 3.8–5.2)
SODIUM SERPL-SCNC: 135 MMOL/L (ref 136–145)
TROPONIN T SERPL HS-MCNC: <6 NG/L
WBC # BLD AUTO: 6.7 10E3/UL (ref 4–11)

## 2023-07-25 PROCEDURE — 250N000013 HC RX MED GY IP 250 OP 250 PS 637: Performed by: EMERGENCY MEDICINE

## 2023-07-25 PROCEDURE — 99285 EMERGENCY DEPT VISIT HI MDM: CPT | Mod: 25

## 2023-07-25 PROCEDURE — 93005 ELECTROCARDIOGRAM TRACING: CPT

## 2023-07-25 PROCEDURE — 36415 COLL VENOUS BLD VENIPUNCTURE: CPT | Performed by: EMERGENCY MEDICINE

## 2023-07-25 PROCEDURE — 82962 GLUCOSE BLOOD TEST: CPT

## 2023-07-25 PROCEDURE — 71275 CT ANGIOGRAPHY CHEST: CPT

## 2023-07-25 PROCEDURE — 85025 COMPLETE CBC W/AUTO DIFF WBC: CPT | Performed by: EMERGENCY MEDICINE

## 2023-07-25 PROCEDURE — 250N000011 HC RX IP 250 OP 636: Performed by: EMERGENCY MEDICINE

## 2023-07-25 PROCEDURE — 96361 HYDRATE IV INFUSION ADD-ON: CPT

## 2023-07-25 PROCEDURE — 83735 ASSAY OF MAGNESIUM: CPT | Performed by: EMERGENCY MEDICINE

## 2023-07-25 PROCEDURE — 84484 ASSAY OF TROPONIN QUANT: CPT | Performed by: EMERGENCY MEDICINE

## 2023-07-25 PROCEDURE — 96375 TX/PRO/DX INJ NEW DRUG ADDON: CPT | Mod: 59

## 2023-07-25 PROCEDURE — 85379 FIBRIN DEGRADATION QUANT: CPT | Performed by: EMERGENCY MEDICINE

## 2023-07-25 PROCEDURE — 250N000011 HC RX IP 250 OP 636: Mod: JZ | Performed by: EMERGENCY MEDICINE

## 2023-07-25 PROCEDURE — 258N000003 HC RX IP 258 OP 636: Performed by: EMERGENCY MEDICINE

## 2023-07-25 PROCEDURE — 80048 BASIC METABOLIC PNL TOTAL CA: CPT | Performed by: EMERGENCY MEDICINE

## 2023-07-25 PROCEDURE — 96365 THER/PROPH/DIAG IV INF INIT: CPT | Mod: 59

## 2023-07-25 PROCEDURE — 96366 THER/PROPH/DIAG IV INF ADDON: CPT

## 2023-07-25 RX ORDER — ALBUTEROL SULFATE 0.83 MG/ML
2.5 SOLUTION RESPIRATORY (INHALATION) EVERY 6 HOURS PRN
Qty: 75 ML | Refills: 0 | Status: SHIPPED | OUTPATIENT
Start: 2023-07-25

## 2023-07-25 RX ORDER — ONDANSETRON 4 MG/1
4 TABLET, ORALLY DISINTEGRATING ORAL ONCE
Status: COMPLETED | OUTPATIENT
Start: 2023-07-25 | End: 2023-07-25

## 2023-07-25 RX ORDER — IOPAMIDOL 755 MG/ML
500 INJECTION, SOLUTION INTRAVASCULAR ONCE
Status: COMPLETED | OUTPATIENT
Start: 2023-07-25 | End: 2023-07-25

## 2023-07-25 RX ORDER — MAGNESIUM SULFATE HEPTAHYDRATE 40 MG/ML
2 INJECTION, SOLUTION INTRAVENOUS ONCE
Status: COMPLETED | OUTPATIENT
Start: 2023-07-25 | End: 2023-07-25

## 2023-07-25 RX ORDER — DOXYCYCLINE 100 MG/1
100 CAPSULE ORAL 2 TIMES DAILY
Qty: 14 CAPSULE | Refills: 0 | Status: SHIPPED | OUTPATIENT
Start: 2023-07-26 | End: 2023-08-02

## 2023-07-25 RX ORDER — DOXYCYCLINE 100 MG/1
100 CAPSULE ORAL ONCE
Status: COMPLETED | OUTPATIENT
Start: 2023-07-25 | End: 2023-07-25

## 2023-07-25 RX ORDER — ONDANSETRON 2 MG/ML
4 INJECTION INTRAMUSCULAR; INTRAVENOUS ONCE
Status: COMPLETED | OUTPATIENT
Start: 2023-07-25 | End: 2023-07-25

## 2023-07-25 RX ADMIN — MAGNESIUM SULFATE HEPTAHYDRATE 2 G: 2 INJECTION, SOLUTION INTRAVENOUS at 19:25

## 2023-07-25 RX ADMIN — SODIUM CHLORIDE 1000 ML: 9 INJECTION, SOLUTION INTRAVENOUS at 18:28

## 2023-07-25 RX ADMIN — IOPAMIDOL 69 ML: 755 INJECTION, SOLUTION INTRAVENOUS at 20:15

## 2023-07-25 RX ADMIN — ONDANSETRON 4 MG: 2 INJECTION INTRAMUSCULAR; INTRAVENOUS at 18:30

## 2023-07-25 RX ADMIN — DOXYCYCLINE HYCLATE 100 MG: 100 CAPSULE ORAL at 22:16

## 2023-07-25 ASSESSMENT — ACTIVITIES OF DAILY LIVING (ADL)
ADLS_ACUITY_SCORE: 35
ADLS_ACUITY_SCORE: 35

## 2023-07-25 NOTE — ED TRIAGE NOTES
Arrives with complaints of dizziness, shortness of breath, and palpitations that started about one hour ago. Alert and oriented, ABCs intact.     Triage Assessment       Row Name 07/25/23 6591       Triage Assessment (Adult)    Airway WDL WDL       Respiratory WDL    Respiratory WDL WDL       Skin Circulation/Temperature WDL    Skin Circulation/Temperature WDL WDL       Cardiac WDL    Cardiac WDL X;all    Pulse Rate & Regularity tachycardic       Peripheral/Neurovascular WDL    Peripheral Neurovascular WDL WDL       Cognitive/Neuro/Behavioral WDL    Cognitive/Neuro/Behavioral WDL WDL

## 2023-07-25 NOTE — ED NOTES
PIT/Triage Evaluation    Patient presented with multiple complaints. Patient reports she got off of work this afternoon and felt light-headed, so she took a nap. After her nap, she woke up with palpitations, shortness of breath, nausea, and vomiting. She is unable to keep foods down. Patient adds that her hands and feet are also tingling. She states she experienced a prior episode when she has had low blood glucose in the past, which is a chronic issue for her.     Exam is notable for:    Patient Vitals for the past 24 hrs:   BP Temp Temp src Pulse Resp SpO2   07/25/23 1712 113/75 98.3  F (36.8  C) Oral 110 20 100 %     General:  Alert, interactive  Cardiovascular:  Well perfused. Tachycardic  Lungs:  No respiratory distress, no accessory muscle use  Neuro:  Moving all 4 extremities  Abd: Soft, non-tender  Skin:  Warm, dry  Psych:  Normal affect    Appropriate interventions for symptom management were initiated if applicable.  Appropriate diagnostic tests were initiated if indicated.    Important information for subsequent clinician:    EKG, trop, dimer --> imaging  CBC, BMP, mag    I briefly evaluated the patient and developed an initial plan of care. I discussed this plan and explained that this brief interaction does not constitute a full evaluation. Patient/family understands that they should wait to be fully evaluated and discuss any test results with another clinician prior to leaving the hospital.     Blake Cespedes,   07/25/23 5144

## 2023-07-26 LAB
ATRIAL RATE - MUSE: 83 BPM
DIASTOLIC BLOOD PRESSURE - MUSE: NORMAL MMHG
INTERPRETATION ECG - MUSE: NORMAL
P AXIS - MUSE: 57 DEGREES
PR INTERVAL - MUSE: 134 MS
QRS DURATION - MUSE: 76 MS
QT - MUSE: 422 MS
QTC - MUSE: 495 MS
R AXIS - MUSE: 21 DEGREES
SYSTOLIC BLOOD PRESSURE - MUSE: NORMAL MMHG
T AXIS - MUSE: 27 DEGREES
VENTRICULAR RATE- MUSE: 83 BPM

## 2023-07-26 NOTE — DISCHARGE INSTRUCTIONS
What do you do next:   Continue your home medications unless we have specifically changed them  Take the antibiotic I prescribed as directed.  You can use your albuterol nebulizer treatments at home as needed.  You can use over-the-counter acetaminophen (Tylenol ) and ibuprofen for pain control for the next 2 to 3 days.  Acetaminophen (Tylenol): Take 500 to 1000 mg by mouth every 6 hours as needed for fever or pain.  Do not take more than 4000 total milligrams of acetaminophen-containing products in a 24-hour timeframe.  Ibuprofen: Take 600 milligrams by mouth every 6-8 hours as needed for fever or pain.  Take this with food or milk to avoid stomach upset.  Follow up as indicated below    When do you return: If you have uncontrolled fevers, severe chest pain, lightheadedness/fainting, severe shortness of breath, or any other symptoms that concern you, please return to the ED for reevaluation.    Thank you for allowing us to care for you today.

## 2023-07-26 NOTE — ED PROVIDER NOTES
History     Chief Complaint:  Shortness of Breath       The history is provided by the patient.      Priscila Betancourt is a 28 year old female who presented with multiple complaints. Patient reports she got off of work this afternoon and felt light-headed, so she took a nap. After her nap, she woke up with palpitations, shortness of breath, nausea, and vomiting. She is unable to keep foods down. Patient adds that her hands and feet are also tingling. She states she experienced a prior episode when she has had low blood glucose in the past, which is a chronic issue for her. Patient states that 3 weeks ago, she was diagnosed with bronchitis and used frequent albuterol/steroids. She feels over the last 1.5 weeks, her shortness of breath and respiratory symptoms today have worsened.     Independent Historian:    None - Patient Only    Review of External Notes:  3/17/23 - chronic obstructive airway disease with asthma    Allergies:  Macrobid [Nitrofurantoin]  Keflex [Cephalexin]     Physical Exam     Patient Vitals for the past 24 hrs:   BP Temp Temp src Pulse Resp SpO2   07/25/23 2038 -- -- -- -- -- 100 %   07/25/23 2034 -- -- -- -- -- 100 %   07/25/23 2004 125/78 -- -- -- -- 100 %   07/25/23 1949 -- -- -- -- -- 100 %   07/25/23 1934 122/73 -- -- -- -- (!) 75 %   07/25/23 1915 117/75 -- -- 87 -- 100 %   07/25/23 1712 113/75 98.3  F (36.8  C) Oral 110 20 100 %        Physical Exam  Constitutional: Vital signs reviewed as above.   Head: No external signs of trauma noted.  Eyes: Pupils are equal, round, and reactive to light.   Neck: No JVD noted  Cardiovascular: tachycardic rate, Regular rhythm and normal heart sounds.  No murmur heard. Equal B/L peripheral pulses.  Pulmonary/Chest: Effort normal and breath sounds normal. No respiratory distress. Patient has no wheezes on my exam. Patient has no rales.   Gastrointestinal: Soft. There is no tenderness.   Musculoskeletal/Extremities: No pitting edema noted. Normal  tone.  Neurological: Patient is alert and oriented to person, place, and time.   Skin: Skin is warm and dry. There is no diaphoresis noted.   Psychiatric: The patient appears calm.      Emergency Department Course   ECG  ECG results from 07/25/23   EKG 12-lead, tracing only     Value    Systolic Blood Pressure     Diastolic Blood Pressure     Ventricular Rate 83    Atrial Rate 83    IN Interval 134    QRS Duration 76        QTc 495    P Axis 57    R AXIS 21    T Axis 27    Interpretation ECG      Sinus rhythm  Prolonged QT  Abnormal ECG  When compared with ECG of 06-NOV-2022 19:58,  T wave amplitude has decreased in Lateral leads  Read by: Dr. Rubina DO     Imaging:  CT Chest Pulmonary Embolism w Contrast   Final Result   IMPRESSION:   1.  No pulmonary emboli. Normal thoracic aorta.   2.  CT signs of nonspecific bronchial and bronchiolar inflammation.   3.  Moderate to marked diffuse hepatic steatosis.   4.  Small esophageal hiatal hernia.         Report per radiology    Laboratory:  Labs Ordered and Resulted from Time of ED Arrival to Time of ED Departure   BASIC METABOLIC PANEL - Abnormal       Result Value    Sodium 135 (*)     Potassium 3.9      Chloride 95 (*)     Carbon Dioxide (CO2) 18 (*)     Anion Gap 22 (*)     Urea Nitrogen 4.5 (*)     Creatinine 0.69      Calcium 9.7      Glucose 83      GFR Estimate >90     MAGNESIUM - Abnormal    Magnesium 1.4 (*)    D DIMER QUANTITATIVE - Abnormal    D-Dimer Quantitative 1.34 (*)    CBC WITH PLATELETS AND DIFFERENTIAL - Abnormal    WBC Count 6.7      RBC Count 4.25      Hemoglobin 13.1      Hematocrit 37.9      MCV 89      MCH 30.8      MCHC 34.6      RDW 15.9 (*)     Platelet Count 273      % Neutrophils 70      % Lymphocytes 25      % Monocytes 5      % Eosinophils 0      % Basophils 0      % Immature Granulocytes 0      NRBCs per 100 WBC 0      Absolute Neutrophils 4.7      Absolute Lymphocytes 1.7      Absolute Monocytes 0.3      Absolute Eosinophils 0.0       Absolute Basophils 0.0      Absolute Immature Granulocytes 0.0      Absolute NRBCs 0.0     GLUCOSE BY METER - Normal    GLUCOSE BY METER POCT 84     TROPONIN T, HIGH SENSITIVITY - Normal    Troponin T, High Sensitivity <6     GLUCOSE WHOLE BLOOD POCT      Emergency Department Course & Assessments:    Interventions:  Medications   ondansetron (ZOFRAN ODT) ODT tab 4 mg (4 mg Oral Not Given 7/25/23 1828)   0.9% sodium chloride BOLUS (0 mLs Intravenous Stopped 7/25/23 1930)   ondansetron (ZOFRAN) injection 4 mg (4 mg Intravenous $Given 7/25/23 1830)   magnesium sulfate 2 g in 50 mL sterile water intermittent infusion (0 g Intravenous Stopped 7/25/23 2108)   iopamidol (ISOVUE-370) solution 500 mL (69 mLs Intravenous $Given 7/25/23 2015)   sodium chloride (PF) 0.9% PF flush 100 mL (89 mLs Intravenous $Given 7/25/23 2015)      Assessments, Independent Interpretation, Consult/Discussion of ManagementTests:  ED Course as of 07/25/23 2148 Tue Jul 25, 2023 1909 Dr. Cespedes' evaluation.    2143 Rechecked and updated.     Social Determinants of Health affecting care:  None    Disposition:  The patient was discharged to home.     Impression & Plan    CMS Diagnoses: None    Code Status: Prior    Medical Decision Making:    This 28 year old female presents to the ED due to Shortness of Breath   . Please see the HPI and exam for specifics. A broad differential was considered including infection, pneumothorax, PE, ACS, etc.    Based on the differential, exam, and any decision tools, the above workup was undertaken. Lab and imaging results were reviewed by me and are notable for hypomagnesemia and elevated D-dimer..    Management of these findings included medications as above.  A CT of the patient's chest was also ordered which fortunately does not demonstrate PE.  It does raise a question of small inflamed areas which given the patient's asthma history could explain some of her symptoms.  She does note several weeks of upper  respiratory symptoms and did use steroids recently but feels that over the last 1.5 weeks she has been feeling worse and has had a productive cough this whole time.  As such I think the patient's etiology of symptoms is likely due to some degree of bronchitis.  Given her persistent symptoms and history of chronic obstructive airway issues with asthma I will elect to prescribe antibiotics for the patient today.  I otherwise think she can be safely discharged to follow-up closely in the outpatient setting.    Anticipatory guidance given prior to discharge.    Critical Care time:  was 0 minutes for this patient excluding procedures.    Diagnosis:  No diagnosis found.     Discharge Medications:  New Prescriptions    No medications on file      Scribe Disclosure:  I, RUPALI CAI, am serving as a scribe at 7:10 PM on 7/25/2023 to document services personally performed by Blake Cespedes DO based on my observations and the provider's statements to me.    7/25/2023   Blake Cespedes DO Burns, Bradley Joseph, DO  07/25/23 3089

## 2023-07-28 ENCOUNTER — VIRTUAL VISIT (OUTPATIENT)
Dept: INTERNAL MEDICINE | Facility: CLINIC | Age: 28
End: 2023-07-28
Payer: COMMERCIAL

## 2023-07-28 DIAGNOSIS — Z21 HIV POSITIVE (H): ICD-10-CM

## 2023-07-28 DIAGNOSIS — J45.41 MODERATE PERSISTENT ASTHMA WITH ACUTE EXACERBATION: Primary | ICD-10-CM

## 2023-07-28 PROBLEM — J44.89 CHRONIC OBSTRUCTIVE AIRWAY DISEASE WITH ASTHMA (H): Status: RESOLVED | Noted: 2020-04-23 | Resolved: 2023-07-28

## 2023-07-28 PROCEDURE — 99213 OFFICE O/P EST LOW 20 MIN: CPT | Mod: 95 | Performed by: INTERNAL MEDICINE

## 2023-07-28 RX ORDER — AZELASTINE 1 MG/ML
2 SPRAY, METERED NASAL 2 TIMES DAILY
COMMUNITY
Start: 2023-06-06

## 2023-07-28 NOTE — PROGRESS NOTES
"Tere is a 28 year old who is being evaluated via a billable video visit.      How would you like to obtain your AVS? MyChart  If the video visit is dropped, the invitation should be resent by: Text to cell phone: [phone]  Will anyone else be joining your video visit? No          Assessment & Plan     1. Moderate persistent asthma with acute exacerbation  Improving significantly  Continue nebulizations as needed  Complete doxycycline  Does not need any steroid  Follow-up with asthma clinic      2. HIV positive (H)  Sees ID clinic, stable      Patient requested a letter clearing her for work, letter was provided today.             MED REC REQUIRED  Post Medication Reconciliation Status: discharge medications reconciled, continue medications without change  BMI:   Estimated body mass index is 26 kg/m  as calculated from the following:    Height as of 3/17/23: 1.702 m (5' 7\").    Weight as of 3/17/23: 75.3 kg (166 lb).           Lindsey Isabel MD  [facility]   Tere is a 28 year old, presenting for the following health issues:  ER F/U      HPI     ED/UC Followup:    Facility:  [facility]  Date of visit: 07/25/2023  Reason for visit: SOB and bronchitis  Current Status: Improving     Patient is feeling much better now.  Almost finished with doxycycline.  She was having greenish phlegm but now has clear yellow mucus in her cough.  She is using her nebulizer regularly and her wheezing has improved a lot.  Shortness of breath has also improved.  She continues to use Symbicort.  Patient sees [facility] in [place] regularly.  She does not smoke.    Patient has HIV since 2016 and she sees Dr. Castellanos  in [place] every  3 months      Review of Systems   Constitutional, HEENT, cardiovascular, pulmonary, gi and gu systems are negative, except as otherwise noted.      Objective           Vitals:  No vitals were obtained today due to virtual visit.    Physical " Exam   GENERAL: Healthy, alert and no distress  EYES: Eyes grossly normal to inspection.  No discharge or erythema, or obvious scleral/conjunctival abnormalities.  RESP: No audible wheeze, cough, or visible cyanosis.  No visible retractions or increased work of breathing.    SKIN: Visible skin clear. No significant rash, abnormal pigmentation or lesions.  NEURO: Cranial nerves grossly intact.  Mentation and speech appropriate for age.  PSYCH: Mentation appears normal, affect normal/bright, judgement and insight intact, normal speech and appearance well-groomed.                Video-Visit Details    Type of service:  Video Visit   Video Start Time:  10:15 AM  Video End Time:10:32 AM    Originating Location (pt. Location): Home    Distant Location (provider location):  On-site  Platform used for Video Visit: [employer]

## 2023-07-28 NOTE — LETTER
July 28, 2023      Priscila Betancourt  [address] [place] [address]        To Whom It May Concern:    Priscila Betancourt was seen in our clinic virtually. She may return to work without restrictions.      Sincerely,        Lindsey Isabel MD

## 2023-08-29 ENCOUNTER — TELEPHONE (OUTPATIENT)
Dept: INTERNAL MEDICINE | Facility: CLINIC | Age: 28
End: 2023-08-29
Payer: COMMERCIAL

## 2023-08-29 NOTE — TELEPHONE ENCOUNTER
Pt states her fingernail is looking green, and now her finger is swollen. Started a week ago. Looking worse.   Not oozing. She is not sure if discolored.   Cannot bend finger, too swollen and painful.     Right hand, middle finger.     She gets her nails done about a month ago. This started a week ago.     Advised her to go to . She doesn't have a PCP at  clinic. She agrees with this.

## 2023-10-03 DIAGNOSIS — J44.89 CHRONIC OBSTRUCTIVE AIRWAY DISEASE WITH ASTHMA (H): ICD-10-CM

## 2023-10-03 DIAGNOSIS — J45.40 MODERATE PERSISTENT ASTHMA WITHOUT COMPLICATION: ICD-10-CM

## 2023-10-06 RX ORDER — FLUTICASONE PROPIONATE 50 MCG
SPRAY, SUSPENSION (ML) NASAL
Qty: 16 G | Refills: 0 | Status: SHIPPED | OUTPATIENT
Start: 2023-10-06

## 2023-10-12 ENCOUNTER — TRANSFERRED RECORDS (OUTPATIENT)
Dept: HEALTH INFORMATION MANAGEMENT | Facility: CLINIC | Age: 28
End: 2023-10-12

## 2023-10-12 ENCOUNTER — LAB (OUTPATIENT)
Dept: LAB | Facility: CLINIC | Age: 28
End: 2023-10-12
Payer: COMMERCIAL

## 2023-10-12 ENCOUNTER — LAB REQUISITION (OUTPATIENT)
Dept: LAB | Facility: CLINIC | Age: 28
End: 2023-10-12
Payer: COMMERCIAL

## 2023-10-12 DIAGNOSIS — J30.1 ALLERGIC RHINITIS DUE TO POLLEN: ICD-10-CM

## 2023-10-12 LAB
BASO+EOS+MONOS # BLD AUTO: ABNORMAL 10*3/UL
BASO+EOS+MONOS NFR BLD AUTO: ABNORMAL %
BASOPHILS # BLD AUTO: 0 10E3/UL (ref 0–0.2)
BASOPHILS NFR BLD AUTO: 0 %
EOSINOPHIL # BLD AUTO: 0 10E3/UL (ref 0–0.7)
EOSINOPHIL NFR BLD AUTO: 1 %
ERYTHROCYTE [DISTWIDTH] IN BLOOD BY AUTOMATED COUNT: 16.2 % (ref 10–15)
HCT VFR BLD AUTO: 36.8 % (ref 35–47)
HGB BLD-MCNC: 12.2 G/DL (ref 11.7–15.7)
IMM GRANULOCYTES # BLD: 0 10E3/UL
IMM GRANULOCYTES NFR BLD: 0 %
LYMPHOCYTES # BLD AUTO: 3 10E3/UL (ref 0.8–5.3)
LYMPHOCYTES NFR BLD AUTO: 40 %
MCH RBC QN AUTO: 31.1 PG (ref 26.5–33)
MCHC RBC AUTO-ENTMCNC: 33.2 G/DL (ref 31.5–36.5)
MCV RBC AUTO: 94 FL (ref 78–100)
MONOCYTES # BLD AUTO: 0.3 10E3/UL (ref 0–1.3)
MONOCYTES NFR BLD AUTO: 5 %
NEUTROPHILS # BLD AUTO: 4.1 10E3/UL (ref 1.6–8.3)
NEUTROPHILS NFR BLD AUTO: 54 %
NRBC # BLD AUTO: 0 10E3/UL
NRBC BLD AUTO-RTO: 0 /100
PLATELET # BLD AUTO: 181 10E3/UL (ref 150–450)
RBC # BLD AUTO: 3.92 10E6/UL (ref 3.8–5.2)
WBC # BLD AUTO: 7.6 10E3/UL (ref 4–11)

## 2023-10-12 PROCEDURE — 82785 ASSAY OF IGE: CPT | Mod: ORL | Performed by: ALLERGY & IMMUNOLOGY

## 2023-10-12 PROCEDURE — 86003 ALLG SPEC IGE CRUDE XTRC EA: CPT | Mod: ORL | Performed by: ALLERGY & IMMUNOLOGY

## 2023-10-12 PROCEDURE — 85025 COMPLETE CBC W/AUTO DIFF WBC: CPT | Mod: ORL | Performed by: ALLERGY & IMMUNOLOGY

## 2023-10-12 PROCEDURE — 36415 COLL VENOUS BLD VENIPUNCTURE: CPT | Mod: ORL | Performed by: ALLERGY & IMMUNOLOGY

## 2023-10-12 PROCEDURE — 82784 ASSAY IGA/IGD/IGG/IGM EACH: CPT | Mod: ORL | Performed by: ALLERGY & IMMUNOLOGY

## 2023-10-13 LAB
IGA SERPL-MCNC: 303 MG/DL (ref 84–499)
IGG SERPL-MCNC: 1425 MG/DL (ref 610–1616)
IGM SERPL-MCNC: 138 MG/DL (ref 35–242)

## 2023-10-15 LAB
A ALTERNATA IGE QN: 21.5 KU(A)/L
A FUMIGATUS IGE QN: 1.88 KU(A)/L
BERMUDA GRASS IGE QN: 0.13 KU(A)/L
C HERBARUM IGE QN: 2.81 KU(A)/L
CAT DANDER IGG QN: 27.7 KU(A)/L
CEDAR IGE QN: 0.38 KU(A)/L
COMMON RAGWEED IGE QN: 0.39 KU(A)/L
COTTONWOOD IGE QN: <0.1 KU(A)/L
D FARINAE IGE QN: 18.4 KU(A)/L
D PTERONYSS IGE QN: 22.6 KU(A)/L
DOG DANDER+EPITH IGE QN: 1.77 KU(A)/L
IGE SERPL-ACNC: 423 KU/L (ref 0–114)
IGE SERPL-ACNC: 423 KU/L (ref 0–114)
MAPLE IGE QN: <0.1 KU(A)/L
MARSH ELDER IGE QN: 0.5 KU(A)/L
MOUSE URINE PROT IGE QN: 1.99 KU(A)/L
NETTLE IGE QN: 0.26 KU(A)/L
P NOTATUM IGE QN: 1.85 KU(A)/L
ROACH IGE QN: 0.99 KU(A)/L
SALTWORT IGE QN: <0.1 KU(A)/L
SILVER BIRCH IGE QN: <0.1 KU(A)/L
TIMOTHY IGE QN: 0.11 KU(A)/L
WHITE ASH IGE QN: <0.1 KU(A)/L
WHITE ELM IGE QN: <0.1 KU(A)/L
WHITE MULBERRY IGE QN: <0.1 KU(A)/L
WHITE OAK IGE QN: <0.1 KU(A)/L

## 2023-12-11 ENCOUNTER — APPOINTMENT (OUTPATIENT)
Dept: GENERAL RADIOLOGY | Facility: CLINIC | Age: 28
End: 2023-12-11
Attending: PHYSICIAN ASSISTANT
Payer: COMMERCIAL

## 2023-12-11 ENCOUNTER — APPOINTMENT (OUTPATIENT)
Dept: CT IMAGING | Facility: CLINIC | Age: 28
End: 2023-12-11
Attending: PHYSICIAN ASSISTANT
Payer: COMMERCIAL

## 2023-12-11 ENCOUNTER — HOSPITAL ENCOUNTER (EMERGENCY)
Facility: CLINIC | Age: 28
Discharge: HOME | End: 2023-12-11
Attending: PHYSICIAN ASSISTANT | Admitting: PHYSICIAN ASSISTANT
Payer: COMMERCIAL

## 2023-12-11 VITALS
TEMPERATURE: 99.5 F | OXYGEN SATURATION: 99 % | RESPIRATION RATE: 18 BRPM | DIASTOLIC BLOOD PRESSURE: 73 MMHG | HEART RATE: 89 BPM | SYSTOLIC BLOOD PRESSURE: 117 MMHG

## 2023-12-11 DIAGNOSIS — J02.0 STREP PHARYNGITIS: ICD-10-CM

## 2023-12-11 DIAGNOSIS — Z86.2: ICD-10-CM

## 2023-12-11 DIAGNOSIS — J18.9 PNEUMONIA OF BOTH LUNGS DUE TO INFECTIOUS ORGANISM, UNSPECIFIED PART OF LUNG: ICD-10-CM

## 2023-12-11 LAB
ALBUMIN SERPL BCG-MCNC: 4.1 G/DL (ref 3.5–5.2)
ALBUMIN UR-MCNC: 30 MG/DL
ALP SERPL-CCNC: 74 U/L (ref 40–150)
ALT SERPL W P-5'-P-CCNC: 13 U/L (ref 0–50)
ANION GAP SERPL CALCULATED.3IONS-SCNC: 14 MMOL/L (ref 7–15)
APPEARANCE UR: CLEAR
AST SERPL W P-5'-P-CCNC: 21 U/L (ref 0–45)
BASOPHILS # BLD AUTO: 0 10E3/UL (ref 0–0.2)
BASOPHILS NFR BLD AUTO: 0 %
BILIRUB SERPL-MCNC: 0.9 MG/DL
BILIRUB UR QL STRIP: NEGATIVE
BUN SERPL-MCNC: 4.4 MG/DL (ref 6–20)
CALCIUM SERPL-MCNC: 9 MG/DL (ref 8.6–10)
CHLORIDE SERPL-SCNC: 95 MMOL/L (ref 98–107)
COLOR UR AUTO: YELLOW
CREAT SERPL-MCNC: 0.71 MG/DL (ref 0.51–0.95)
D DIMER PPP FEU-MCNC: 0.41 UG/ML FEU (ref 0–0.5)
DEPRECATED HCO3 PLAS-SCNC: 23 MMOL/L (ref 22–29)
EGFRCR SERPLBLD CKD-EPI 2021: >90 ML/MIN/1.73M2
EOSINOPHIL # BLD AUTO: 0 10E3/UL (ref 0–0.7)
EOSINOPHIL NFR BLD AUTO: 0 %
ERYTHROCYTE [DISTWIDTH] IN BLOOD BY AUTOMATED COUNT: 14.6 % (ref 10–15)
FLUAV RNA SPEC QL NAA+PROBE: NEGATIVE
FLUBV RNA RESP QL NAA+PROBE: NEGATIVE
GLUCOSE SERPL-MCNC: 90 MG/DL (ref 70–99)
GLUCOSE UR STRIP-MCNC: NEGATIVE MG/DL
GROUP A STREP BY PCR: DETECTED
HCG SERPL QL: NEGATIVE
HCT VFR BLD AUTO: 38.9 % (ref 35–47)
HGB BLD-MCNC: 13.2 G/DL (ref 11.7–15.7)
HGB UR QL STRIP: ABNORMAL
IMM GRANULOCYTES # BLD: 0.1 10E3/UL
IMM GRANULOCYTES NFR BLD: 1 %
KETONES UR STRIP-MCNC: NEGATIVE MG/DL
LEUKOCYTE ESTERASE UR QL STRIP: NEGATIVE
LIPASE SERPL-CCNC: 20 U/L (ref 13–60)
LYMPHOCYTES # BLD AUTO: 1.8 10E3/UL (ref 0.8–5.3)
LYMPHOCYTES NFR BLD AUTO: 11 %
MCH RBC QN AUTO: 30.7 PG (ref 26.5–33)
MCHC RBC AUTO-ENTMCNC: 33.9 G/DL (ref 31.5–36.5)
MCV RBC AUTO: 91 FL (ref 78–100)
MONOCYTES # BLD AUTO: 0.5 10E3/UL (ref 0–1.3)
MONOCYTES NFR BLD AUTO: 3 %
MUCOUS THREADS #/AREA URNS LPF: PRESENT /LPF
NEUTROPHILS # BLD AUTO: 13.5 10E3/UL (ref 1.6–8.3)
NEUTROPHILS NFR BLD AUTO: 85 %
NITRATE UR QL: NEGATIVE
NRBC # BLD AUTO: 0 10E3/UL
NRBC BLD AUTO-RTO: 0 /100
PH UR STRIP: 6.5 [PH] (ref 5–7)
PLATELET # BLD AUTO: 195 10E3/UL (ref 150–450)
POTASSIUM SERPL-SCNC: 3.5 MMOL/L (ref 3.4–5.3)
PROT SERPL-MCNC: 8.1 G/DL (ref 6.4–8.3)
RBC # BLD AUTO: 4.3 10E6/UL (ref 3.8–5.2)
RBC URINE: 4 /HPF
RSV RNA SPEC NAA+PROBE: NEGATIVE
SARS-COV-2 RNA RESP QL NAA+PROBE: NEGATIVE
SODIUM SERPL-SCNC: 132 MMOL/L (ref 135–145)
SP GR UR STRIP: 1.02 (ref 1–1.03)
SQUAMOUS EPITHELIAL: 4 /HPF
TROPONIN T SERPL HS-MCNC: <6 NG/L
UROBILINOGEN UR STRIP-MCNC: NORMAL MG/DL
WBC # BLD AUTO: 16 10E3/UL (ref 4–11)
WBC URINE: 3 /HPF

## 2023-12-11 PROCEDURE — 94640 AIRWAY INHALATION TREATMENT: CPT

## 2023-12-11 PROCEDURE — 74177 CT ABD & PELVIS W/CONTRAST: CPT

## 2023-12-11 PROCEDURE — 36415 COLL VENOUS BLD VENIPUNCTURE: CPT | Performed by: PHYSICIAN ASSISTANT

## 2023-12-11 PROCEDURE — 84703 CHORIONIC GONADOTROPIN ASSAY: CPT | Performed by: PHYSICIAN ASSISTANT

## 2023-12-11 PROCEDURE — 81001 URINALYSIS AUTO W/SCOPE: CPT | Performed by: PHYSICIAN ASSISTANT

## 2023-12-11 PROCEDURE — 71046 X-RAY EXAM CHEST 2 VIEWS: CPT

## 2023-12-11 PROCEDURE — 250N000011 HC RX IP 250 OP 636: Performed by: PHYSICIAN ASSISTANT

## 2023-12-11 PROCEDURE — 96360 HYDRATION IV INFUSION INIT: CPT | Mod: 59

## 2023-12-11 PROCEDURE — 250N000009 HC RX 250: Performed by: PHYSICIAN ASSISTANT

## 2023-12-11 PROCEDURE — 87651 STREP A DNA AMP PROBE: CPT | Performed by: PHYSICIAN ASSISTANT

## 2023-12-11 PROCEDURE — 250N000013 HC RX MED GY IP 250 OP 250 PS 637: Performed by: PHYSICIAN ASSISTANT

## 2023-12-11 PROCEDURE — 96361 HYDRATE IV INFUSION ADD-ON: CPT

## 2023-12-11 PROCEDURE — 99285 EMERGENCY DEPT VISIT HI MDM: CPT | Mod: 25

## 2023-12-11 PROCEDURE — 87637 SARSCOV2&INF A&B&RSV AMP PRB: CPT | Performed by: PHYSICIAN ASSISTANT

## 2023-12-11 PROCEDURE — 87637 SARSCOV2&INF A&B&RSV AMP PRB: CPT | Performed by: EMERGENCY MEDICINE

## 2023-12-11 PROCEDURE — 85379 FIBRIN DEGRADATION QUANT: CPT | Performed by: PHYSICIAN ASSISTANT

## 2023-12-11 PROCEDURE — 84484 ASSAY OF TROPONIN QUANT: CPT | Performed by: PHYSICIAN ASSISTANT

## 2023-12-11 PROCEDURE — 85025 COMPLETE CBC W/AUTO DIFF WBC: CPT | Performed by: PHYSICIAN ASSISTANT

## 2023-12-11 PROCEDURE — 258N000003 HC RX IP 258 OP 636: Performed by: PHYSICIAN ASSISTANT

## 2023-12-11 PROCEDURE — 80053 COMPREHEN METABOLIC PANEL: CPT | Performed by: PHYSICIAN ASSISTANT

## 2023-12-11 PROCEDURE — 93005 ELECTROCARDIOGRAM TRACING: CPT

## 2023-12-11 PROCEDURE — 83690 ASSAY OF LIPASE: CPT | Performed by: PHYSICIAN ASSISTANT

## 2023-12-11 RX ORDER — BUDESONIDE AND FORMOTEROL FUMARATE DIHYDRATE 160; 4.5 UG/1; UG/1
2 AEROSOL RESPIRATORY (INHALATION) 3 TIMES DAILY
Qty: 10.2 G | Refills: 0 | Status: SHIPPED | OUTPATIENT
Start: 2023-12-11

## 2023-12-11 RX ORDER — IOPAMIDOL 755 MG/ML
120 INJECTION, SOLUTION INTRAVASCULAR ONCE
Status: COMPLETED | OUTPATIENT
Start: 2023-12-11 | End: 2023-12-11

## 2023-12-11 RX ORDER — DOXYCYCLINE 100 MG/1
100 CAPSULE ORAL ONCE
Status: COMPLETED | OUTPATIENT
Start: 2023-12-11 | End: 2023-12-11

## 2023-12-11 RX ORDER — IPRATROPIUM BROMIDE AND ALBUTEROL SULFATE 2.5; .5 MG/3ML; MG/3ML
3 SOLUTION RESPIRATORY (INHALATION) ONCE
Status: COMPLETED | OUTPATIENT
Start: 2023-12-11 | End: 2023-12-11

## 2023-12-11 RX ORDER — DOXYCYCLINE 100 MG/1
100 CAPSULE ORAL 2 TIMES DAILY
Qty: 10 CAPSULE | Refills: 0 | Status: SHIPPED | OUTPATIENT
Start: 2023-12-11 | End: 2023-12-16

## 2023-12-11 RX ORDER — IBUPROFEN 200 MG
400 TABLET ORAL ONCE
Status: COMPLETED | OUTPATIENT
Start: 2023-12-11 | End: 2023-12-11

## 2023-12-11 RX ORDER — CODEINE PHOSPHATE AND GUAIFENESIN 10; 100 MG/5ML; MG/5ML
1-2 SOLUTION ORAL EVERY 6 HOURS PRN
Qty: 60 ML | Refills: 0 | Status: SHIPPED | OUTPATIENT
Start: 2023-12-11 | End: 2024-01-23

## 2023-12-11 RX ADMIN — AMOXICILLIN AND CLAVULANATE POTASSIUM 1 TABLET: 875; 125 TABLET, FILM COATED ORAL at 20:37

## 2023-12-11 RX ADMIN — IOPAMIDOL 83 ML: 755 INJECTION, SOLUTION INTRAVENOUS at 19:23

## 2023-12-11 RX ADMIN — IPRATROPIUM BROMIDE AND ALBUTEROL SULFATE 3 ML: .5; 3 SOLUTION RESPIRATORY (INHALATION) at 18:59

## 2023-12-11 RX ADMIN — DOXYCYCLINE HYCLATE 100 MG: 100 CAPSULE ORAL at 20:37

## 2023-12-11 RX ADMIN — SODIUM CHLORIDE 61 ML: 9 INJECTION, SOLUTION INTRAVENOUS at 19:24

## 2023-12-11 RX ADMIN — SODIUM CHLORIDE 1000 ML: 9 INJECTION, SOLUTION INTRAVENOUS at 18:56

## 2023-12-11 RX ADMIN — IBUPROFEN 400 MG: 200 TABLET, FILM COATED ORAL at 18:57

## 2023-12-11 ASSESSMENT — ACTIVITIES OF DAILY LIVING (ADL)
ADLS_ACUITY_SCORE: 33
ADLS_ACUITY_SCORE: 35

## 2023-12-11 NOTE — LETTER
December 11, 2023      To Whom It May Concern:      Priscila Betancourt was seen in our Emergency Department today, 12/11/23.  I expect her condition to improve over the next 1 day.  She may return to work/school when improved.    Sincerely,        Chanda ROUSSEAU RN

## 2023-12-11 NOTE — ED TRIAGE NOTES
Pt arrives with body ahces, fevers, vomiting, and cough since Saturday. Home tmax 102.3. ABCs intact.     /63   Pulse 103   Temp 99.5  F (37.5  C)   Resp 24   SpO2 94%        Triage Assessment (Adult)       Row Name 12/11/23 1305          Triage Assessment    Airway WDL WDL        Respiratory WDL    Respiratory WDL X;cough        Cardiac WDL    Cardiac WDL WDL        Cognitive/Neuro/Behavioral WDL    Cognitive/Neuro/Behavioral WDL WDL

## 2023-12-11 NOTE — LETTER
December 11, 2023      To Whom It May Concern:      Priscila Betancourt was seen in our Emergency Department today, 12/11/23.  I expect her condition to improve over the next 2-3 days.  She may return to work when improved.    Sincerely,        Cali Willson PA-C

## 2023-12-11 NOTE — ED PROVIDER NOTES
"  History     Chief Complaint:  Flu Symptoms       The history is provided by the patient.      Priscila Betancourt is a 28 year old female with a history of HIV, COPD, asthma, and adjustment disorder who presents with flu symptoms. She first started having symptoms when she woke up 2 days ago with chills. Later that day, she took a shower thinking it would help, but started to feel numbness in her hands and face and had a near syncopal episode after she got out.  About a hour later, she developed wheezing, dizziness, sore throat, diarrhea, cough, and left sided chest pain. She notes the chest pain and wheezing tend to be normal this time of the year for her. She has been using an inhaler, and noticed that both the pain and wheezing will improve with it. She continued to have the symptoms yesterday, but did not have any tingling as her skin rather felt itchy. Additionally, she developed abdominal pain, a fever, diaphoresis, headache, and decreased appetite that day. Her fever has been as high as 104 degrees. Then this morning, she woke up with \"nonstop diarrhea\" and vomiting. She notes that she is unable to keep any fluids due to the vomiting, and noticed some puss in her liquid stools. However, she denies hematochezia. Other symptoms mentioned at bedside include rhinorrhea, congestion, lower abdominal pain that radiates to her back, and dysuria. She has been taking Ibuprofen for pain management with, and her last dose was at 1300. Other symptom management methods include unspecified cough medicine and smoking marijuana. She was recently exposed to her Aunt who was sick. She further denies having a rash.     Independent Historian:   None - Patient Only    Review of External Notes:   None      Medications:    Albuterol   Flonase   Symbicort   Triumeq     Past Medical History:    Asthma   Asx HIV infection status   Adjustment disorder w/ mixed disturbance of emotions and conduct   Anxiety   Anemia   COPD  Arthritis  "     Past Surgical History:       Colonoscopy w/ bx     Physical Exam   Patient Vitals for the past 24 hrs:   BP Temp Pulse Resp SpO2   23 2056 117/73 -- 89 18 99 %   23 1306 104/63 -- -- -- --   23 1304 -- 99.5  F (37.5  C) 103 24 94 %        Physical Exam  Constitutional: Pleasant. Cooperative.   Eyes: Pupils equally round and reactive  HENT: Head is normal in appearance. Oropharyngeal erythema.   Cardiovascular: Regular rate and rhythm and without murmurs.  Respiratory: Normal respiratory effort, lungs are clear bilaterally. No wheezing. No crackles.  GI: Mild diffuse TTP. Soft, non-distended. No guarding, rebound, or rigidity.  Musculoskeletal: No asymmetry of the lower extremities.  Skin: Normal, without rash.  Neurologic: Cranial nerves grossly intact, normal cognition, no focal deficits. Alert and oriented x 3.   Psychiatric: Normal affect.  Nursing notes and vital signs reviewed.      Emergency Department Course   ECG:  ECG taken at 1815, ECG read at 1824  Sinus tachycardia   Cannot rule out Anterior infarct, age undetermined   Abnormal ECG   Rate 102 bpm. OK interval 144 ms. QRS duration 80 ms. QT/QTc 348/453 ms. P-R-T axes 58 35 31.     Imaging:  XR Chest 2 Views  Final Result  IMPRESSION: New patchy bibasilar airspace disease/groundglass opacity compatible with multifocal pneumonitis. No effusions or pneumothorax. Heart size is normal. No acute bony abnormalities.    CT Abdomen Pelvis w Contrast  Final Result  IMPRESSION:   1.  No acute process in the abdomen or pelvis.  2.  Patchy airspace opacities at both lung bases, greatest within the left lower lobe and visualized right middle lobe, suspicious for multifocal infection.     Results per radiology     Laboratory:  Labs Ordered and Resulted from Time of ED Arrival to Time of ED Departure   COMPREHENSIVE METABOLIC PANEL - Abnormal       Result Value    Sodium 132 (*)     Potassium 3.5      Carbon Dioxide (CO2) 23      Anion Gap  14      Urea Nitrogen 4.4 (*)     Creatinine 0.71      GFR Estimate >90      Calcium 9.0      Chloride 95 (*)     Glucose 90      Alkaline Phosphatase 74      AST 21      ALT 13      Protein Total 8.1      Albumin 4.1      Bilirubin Total 0.9     ROUTINE UA WITH MICROSCOPIC REFLEX TO CULTURE - Abnormal    Color Urine Yellow      Appearance Urine Clear      Glucose Urine Negative      Bilirubin Urine Negative      Ketones Urine Negative      Specific Gravity Urine 1.017      Blood Urine Moderate (*)     pH Urine 6.5      Protein Albumin Urine 30 (*)     Urobilinogen Urine Normal      Nitrite Urine Negative      Leukocyte Esterase Urine Negative      Mucus Urine Present (*)     RBC Urine 4 (*)     WBC Urine 3      Squamous Epithelials Urine 4 (*)    CBC WITH PLATELETS AND DIFFERENTIAL - Abnormal    WBC Count 16.0 (*)     RBC Count 4.30      Hemoglobin 13.2      Hematocrit 38.9      MCV 91      MCH 30.7      MCHC 33.9      RDW 14.6      Platelet Count 195      % Neutrophils 85      % Lymphocytes 11      % Monocytes 3      % Eosinophils 0      % Basophils 0      % Immature Granulocytes 1      NRBCs per 100 WBC 0      Absolute Neutrophils 13.5 (*)     Absolute Lymphocytes 1.8      Absolute Monocytes 0.5      Absolute Eosinophils 0.0      Absolute Basophils 0.0      Absolute Immature Granulocytes 0.1      Absolute NRBCs 0.0     GROUP A STREPTOCOCCUS PCR THROAT SWAB - Abnormal    Group A strep by PCR Detected (*)    INFLUENZA A/B, RSV, & SARS-COV2 PCR - Normal    Influenza A PCR Negative      Influenza B PCR Negative      RSV PCR Negative      SARS CoV2 PCR Negative     D DIMER QUANTITATIVE - Normal    D-Dimer Quantitative 0.41     LIPASE - Normal    Lipase 20     TROPONIN T, HIGH SENSITIVITY - Normal    Troponin T, High Sensitivity <6     HCG QUALITATIVE PREGNANCY - Normal    hCG Serum Qualitative Negative     C. DIFFICILE TOXIN B PCR WITH REFLEX TO C. DIFFICILE ANTIGEN AND TOXINS A/B EIA   ENTERIC BACTERIA AND VIRUS  PANEL BY PCR      Emergency Department Course & Assessments:    Interventions:  Medications   sodium chloride 0.9% BOLUS 1,000 mL (0 mLs Intravenous Stopped 12/11/23 2044)   ibuprofen (ADVIL/MOTRIN) tablet 400 mg (400 mg Oral $Given 12/11/23 1857)   ipratropium - albuterol 0.5 mg/2.5 mg/3 mL (DUONEB) neb solution 3 mL (3 mLs Nebulization $Given 12/11/23 1859)   Saline CT scan flush (61 mLs Intravenous $Given 12/11/23 1924)   iopamidol (ISOVUE-370) solution 120 mL (83 mLs Intravenous $Given 12/11/23 1923)   doxycycline hyclate (VIBRAMYCIN) capsule 100 mg (100 mg Oral $Given 12/11/23 2037)   amoxicillin-clavulanate (AUGMENTIN) 875-125 MG per tablet 1 tablet (1 tablet Oral $Given 12/11/23 2037)      Assessments:  1757 I obtained history and examined the patient as noted above.  1912 I rechecked the patient and explained findings.    Independent Interpretation (X-rays, CTs, rhythm strip):  None    Consultations/Discussion of Management or Tests:  None      Social Determinants of Health affecting care:   Stress/Adjustment Disorders    Disposition:  The patient was discharged to home.     Impression & Plan    Medical Decision Making:  Priscila Betancourt is a 28 year old female who presents to the ED for evaluation of a multitude of symptoms, including sore throat, cough, chest pain, abdominal pain, diarrhea, headache, fever, amongst others.  Given broad range of symptoms, broad differential considered, including strep throat, viral URI, PTA, RPA, epiglottitis, atypical ACS dissection, PE, pneumothorax, pneumonia, intra-abdominal pathology such as appendicitis, biliary pathology, SBO, perforated viscus, colitis, meningitis, amongst others.  Given broad differential, broad workup obtained as above.  Workup notable for strep positive status as well as suggestion for pneumonia.  Remainder of workup felt to be less likely based upon broad workup performed as above.  Will start patient on antibiotics as below and have her follow-up  closely with her PCP.  [place] patient was safe for discharge to home. Short course of robitussin AC. Discussed narcotic precautions. Discussed reasons to return. All questions answered. Patient discharged to home in stable condition.    Diagnosis:    ICD-10-CM    1. Pneumonia of both lungs due to infectious organism, unspecified part of lung  J18.9       2. Strep pharyngitis  J02.0       3. H/O immunocompromised state  Z86.2            Discharge Medications:  Discharge Medication List as of 12/11/2023  8:45 PM        START taking these medications    Details   amoxicillin-clavulanate (AUGMENTIN) 875-125 MG tablet Take 1 tablet by mouth 2 times daily for 10 days, Disp-20 tablet, R-0, Local Print      !! budesonide-formoterol (SYMBICORT) 160-4.5 MCG/ACT Inhaler Inhale 2 puffs into the lungs 3 times daily, Disp-10.2 g, R-0, Local Print      doxycycline hyclate (VIBRAMYCIN) 100 MG capsule Take 1 capsule (100 mg) by mouth 2 times daily for 5 days, Disp-10 capsule, R-0, Local Print      guaiFENesin-codeine (ROBITUSSIN AC) 100-10 MG/5ML solution Take 5-10 mLs by mouth every 6 hours as needed for cough, Disp-60 mL, R-0, Local Print       !! - Potential duplicate medications found. Please discuss with provider.         Scribe Disclosure:  I, Fitzmaximino Hsu, am serving as a scribe at 4:09 PM on 12/11/2023 to document services personally performed by Cali Willson PA-C based on my observations and the provider's statements to me.   12/11/2023   Cali Willson PA-C     This record was created at least in part using electronic voice recognition software, so please excuse any typographical errors.       Cali Willson PA-C  12/11/23 3524

## 2023-12-12 LAB
ATRIAL RATE - MUSE: 102 BPM
DIASTOLIC BLOOD PRESSURE - MUSE: NORMAL MMHG
INTERPRETATION ECG - MUSE: NORMAL
P AXIS - MUSE: 58 DEGREES
PR INTERVAL - MUSE: 144 MS
QRS DURATION - MUSE: 80 MS
QT - MUSE: 348 MS
QTC - MUSE: 453 MS
R AXIS - MUSE: 35 DEGREES
SYSTOLIC BLOOD PRESSURE - MUSE: NORMAL MMHG
T AXIS - MUSE: 31 DEGREES
VENTRICULAR RATE- MUSE: 102 BPM

## 2023-12-12 NOTE — DISCHARGE INSTRUCTIONS
Take full course of antibiotics as prescribed.  Use Tylenol and ibuprofen for body aches, sore throat.  Stay hydrated.  Use Robitussin with codeine very sparingly. This is sedating. Do not drive after taking.

## 2024-01-23 ENCOUNTER — OFFICE VISIT (OUTPATIENT)
Dept: FAMILY MEDICINE | Facility: CLINIC | Age: 29
End: 2024-01-23
Payer: COMMERCIAL

## 2024-01-23 VITALS
HEART RATE: 76 BPM | BODY MASS INDEX: 25.44 KG/M2 | RESPIRATION RATE: 16 BRPM | DIASTOLIC BLOOD PRESSURE: 71 MMHG | WEIGHT: 162.1 LBS | HEIGHT: 67 IN | OXYGEN SATURATION: 98 % | TEMPERATURE: 97.6 F | SYSTOLIC BLOOD PRESSURE: 112 MMHG

## 2024-01-23 DIAGNOSIS — K62.5 BRIGHT RED RECTAL BLEEDING: ICD-10-CM

## 2024-01-23 DIAGNOSIS — R30.0 DYSURIA: Primary | ICD-10-CM

## 2024-01-23 DIAGNOSIS — N76.0 BV (BACTERIAL VAGINOSIS): ICD-10-CM

## 2024-01-23 DIAGNOSIS — B96.89 BV (BACTERIAL VAGINOSIS): ICD-10-CM

## 2024-01-23 DIAGNOSIS — K64.8 INTERNAL HEMORRHOID, BLEEDING: ICD-10-CM

## 2024-01-23 DIAGNOSIS — B20 HIV DISEASE (H): ICD-10-CM

## 2024-01-23 LAB
BACTERIA #/AREA URNS HPF: ABNORMAL /HPF
RBC #/AREA URNS AUTO: ABNORMAL /HPF
SQUAMOUS #/AREA URNS AUTO: ABNORMAL /LPF
WBC #/AREA URNS AUTO: ABNORMAL /HPF

## 2024-01-23 PROCEDURE — 87086 URINE CULTURE/COLONY COUNT: CPT | Performed by: FAMILY MEDICINE

## 2024-01-23 PROCEDURE — 81015 MICROSCOPIC EXAM OF URINE: CPT | Performed by: FAMILY MEDICINE

## 2024-01-23 PROCEDURE — 99214 OFFICE O/P EST MOD 30 MIN: CPT | Performed by: FAMILY MEDICINE

## 2024-01-23 RX ORDER — CLINDAMYCIN PHOSPHATE 20 MG/G
1 CREAM VAGINAL AT BEDTIME
Qty: 40 G | Refills: 1 | Status: SHIPPED | OUTPATIENT
Start: 2024-01-23

## 2024-01-23 ASSESSMENT — PAIN SCALES - GENERAL: PAINLEVEL: EXTREME PAIN (8)

## 2024-01-23 ASSESSMENT — ASTHMA QUESTIONNAIRES
QUESTION_2 LAST FOUR WEEKS HOW OFTEN HAVE YOU HAD SHORTNESS OF BREATH: ONCE OR TWICE A WEEK
ACT_TOTALSCORE: 20
QUESTION_4 LAST FOUR WEEKS HOW OFTEN HAVE YOU USED YOUR RESCUE INHALER OR NEBULIZER MEDICATION (SUCH AS ALBUTEROL): ONCE A WEEK OR LESS
EMERGENCY_ROOM_LAST_YEAR_TOTAL: THREE OR MORE
QUESTION_5 LAST FOUR WEEKS HOW WOULD YOU RATE YOUR ASTHMA CONTROL: SOMEWHAT CONTROLLED
ACT_TOTALSCORE: 20
QUESTION_1 LAST FOUR WEEKS HOW MUCH OF THE TIME DID YOUR ASTHMA KEEP YOU FROM GETTING AS MUCH DONE AT WORK, SCHOOL OR AT HOME: A LITTLE OF THE TIME
QUESTION_3 LAST FOUR WEEKS HOW OFTEN DID YOUR ASTHMA SYMPTOMS (WHEEZING, COUGHING, SHORTNESS OF BREATH, CHEST TIGHTNESS OR PAIN) WAKE YOU UP AT NIGHT OR EARLIER THAN USUAL IN THE MORNING: NOT AT ALL

## 2024-01-23 NOTE — PROGRESS NOTES
"  Assessment & Plan   Problem List Items Addressed This Visit       Bright red rectal bleeding     Recurrent painless bright red blood per rectum since childbirth.  Suspect hemorrhoids.  Refer         Relevant Orders    Adult Colorectal Surgery  Referral    BV (bacterial vaginosis)     Previously diagnosed, treated with MetroGel.  Discussed normal vaginal dilia perineal hygiene.  Clindamycin vaginal         Relevant Medications    clindamycin (CLEOCIN) 2 % vaginal cream    Dysuria - Primary     Treated for cystitis with borderline colony counts.  Dysuria continues.  Lessened.  No white cells in current UA.  Culture.  Treat BV         Relevant Orders    Urine Culture Aerobic Bacterial - lab collect    UA Macroscopic with reflex to Microscopic and Culture - Lab Collect    NEISSERIA GONORRHOEA PCR    UA Microscopic with Reflex to Culture (Completed)    HIV disease (H)     Dr Castellanos Infectious Disease         Internal hemorrhoid, bleeding     Most likely diagnosis         Relevant Orders    Adult Colorectal Surgery  Referral               BMI  Estimated body mass index is 25.39 kg/m  as calculated from the following:    Height as of this encounter: 1.702 m (5' 7\").    Weight as of this encounter: 73.5 kg (162 lb 1.6 oz).             Delmy Carranza is a 28 year old, presenting for the following health issues:  UTI (Follow up)        1/23/2024     3:58 PM   Additional Questions   Roomed by Cherie SANCHEZ CMA     UTI    History of Present Illness       Reason for visit:  Uit culture follow up and im still having symptoms and been having burning in anus and cant have proper bowels without extereme pain    She eats 2-3 servings of fruits and vegetables daily.She consumes 1 sweetened beverage(s) daily.She exercises with enough effort to increase her heart rate 60 or more minutes per day.  She exercises with enough effort to increase her heart rate 5 days per week.   She is taking medications regularly.   " "    ED/UC Followup:    Facility:  [facility]  Date of visit: 12/27/2023  Reason for visit: UTI  Current Status: Still having symptoms after finishing antibiotics.           Objective    /71 (BP Location: Right arm, Patient Position: Sitting, Cuff Size: Adult Regular)   Pulse 76   Temp 97.6  F (36.4  C) (Temporal)   Resp 16   Ht 1.702 m (5' 7\")   Wt 73.5 kg (162 lb 1.6 oz)   LMP 01/09/2024 (Approximate)   SpO2 98%   BMI 25.39 kg/m    Body mass index is 25.39 kg/m .  Physical Exam       Results for orders placed or performed in visit on 01/23/24   UA Microscopic with Reflex to Culture     Status: Abnormal   Result Value Ref Range    Bacteria Urine Many (A) None Seen /HPF    RBC Urine 0-2 0-2 /HPF /HPF    WBC Urine 0-5 0-5 /HPF /HPF    Squamous Epithelials Urine Many (A) None Seen /LPF    Narrative    Urine Culture not indicated             Signed Electronically by: Michelet Muñoz MD    "

## 2024-01-23 NOTE — COMMUNITY RESOURCES LIST (ENGLISH)
01/23/2024   [facility]  N/A  For questions about this resource list or additional care needs, please contact your primary care clinic or care manager.  Phone: [phone]   Email: N/A   Address: [address] [place] [id number]   Hours: N/A        Hotlines and Helplines       Hotline - Housing crisis  1  [facility] (Main Office) Distance: 6.17 miles      Phone/Virtual   [address] [place] [address]  Language: English  Hours: Mon - Sun Open 24 Hours   Phone: (398) 156-7785 Email: info@[employer] Website: http://[employer] Hero     2  [facility] Distance: 12.86 miles      Phone/Virtual   [address] [place] [address]  Language: English  Hours: Mon - Sun Open 24 Hours   Phone: (818) 612-4575 Email: info@[employer] Website: http://www.[employer].org          Housing       Coordinated Entry access point  3  [facility] [employer] [facility] [place] ([facility] - Housing Services Distance: 12.96 miles      In-Person   [address] [place] [address]  Language: English  Hours: Mon - Fri 9:00 AM - 5:00 PM  Fees: Free   Phone: (948) 802-1222 Email: housing@[facility].org Website: http://www.[facility].org/housing     4  [facility] Distance: 15.28 miles      In-Person, Phone/Virtual   [address] [place] [address]  Language: English  Hours: Mon - Fri 8:30 AM - 4:30 PM  Fees: Free   Phone: (346) 474-4676 Email: info@[facility].org Website: https://www.[facility].org/locations/[facility]/     Drop-in center or day shelter  5  [facility] Distance: 13.35 miles      In-Person   [address] [place] [address]  Language: English  Hours: Mon - Fri 12:00 PM - 3:00 PM  Fees: Free   Phone: (828) 982-5958 Email: Five minutes@[employer] Website: http://[employer] minutes.org/     6  [facility] Distance: 13.51 miles       In-Person   [address] [place] [address]  Language: English, [place]ian, Citizen of [place]  Hours: Mon - Sat 7:00 AM - 3:00 PM  Fees: Free, Self Pay   Phone: (341) 272-2054 Email: info@[employer] Website: https://www.[employer]/locations/opportunity-center/     Housing search assistance  7  [facility] - Rental Homes for Future Homebuyers Program Distance: 3.72 miles      Phone/Virtual   [address] [place] [address]  Language: English  Hours: Mon - Fri 8:00 AM - 4:30 PM  Fees: Free   Phone: (402) 465-2753 Email: hra@[facility] Website: https://www.[facility]/hra/[place]-housing-and-pxifdqjudfxmu-gepkjiskj-nfj     8  [facility] - Online Housing Search Assistance Distance: 12.05 miles      Phone/Virtual   [address] [place] [address]  Language: English  Hours: Mon - Sun Open 24 Hours  Fees: Free   Phone: (579) 193-3197 Email: norah@[employer] Website: https://[employer]/     Shelter for families  9  [facility] Distance: 8.7 miles      In-Person   [address] [place] [address]  Language: English  Hours: Mon - Sun Open 24 Hours  Fees: Free, Sliding Fee   Phone: (823) 117-8489 Ext.1 Email: info@[facility] Hospital[facility]ville[facility] [employer] Website: http://www.[facility].org     Shelter for individuals  10  [employer] ([employer], & [facility] Distance: 9.01 miles      In-Person   [address] [place] [address]  Language: English, Citizen of [place]  Hours: Mon - Fri 8:00 AM - 4:30 PM  Fees: Free   Phone: (617) 994-7427 Email: info@[facility]org Website: http://www.[facility] ([employer]).org     11  [employer] ([employer]) [facility] [place] & [facility] [place] Distance: 11.19 miles      In-Person   [address] [place] [address]  Language: English, Citizen of [place]  Hours: Mon - Fri 8:00 AM - 4:30 PM  Fees: Free   Phone: (290)  162-7205 Email: info@[employer].org Website: https://www.capagency.org/          Important Numbers & Websites       Emergency Services   911  [facility] Services   311  Poison Control   (704) 620-3042  Suicide Prevention Lifeline   (985) 271-7304 (TALK)  Child Abuse Hotline   (974) 494-7588 (4-A-Child)  Sexual Assault Hotline   (597) 410-9618 (HOPE)  National Runaway Safeline   (996) 344-3171 (RUNAWAY)  All-Options Talkline   (165) 455-7670  Substance Abuse Referral   (638) 699-8045 (HELP)

## 2024-01-24 PROBLEM — B96.89 BV (BACTERIAL VAGINOSIS): Status: ACTIVE | Noted: 2024-01-24

## 2024-01-24 PROBLEM — Z34.01 ENCOUNTER FOR SUPERVISION OF NORMAL FIRST PREGNANCY IN FIRST TRIMESTER: Status: RESOLVED | Noted: 2019-03-14 | Resolved: 2024-01-24

## 2024-01-24 PROBLEM — N76.0 BV (BACTERIAL VAGINOSIS): Status: ACTIVE | Noted: 2024-01-24

## 2024-01-24 NOTE — ASSESSMENT & PLAN NOTE
Treated for cystitis with borderline colony counts.  Dysuria continues.  Lessened.  No white cells in current UA.  Culture.  Treat BV

## 2024-01-24 NOTE — ASSESSMENT & PLAN NOTE
Previously diagnosed, treated with MetroGel.  Discussed normal vaginal dilia perineal hygiene.  Clindamycin vaginal

## 2024-01-25 ENCOUNTER — DOCUMENTATION ONLY (OUTPATIENT)
Dept: FAMILY MEDICINE | Facility: CLINIC | Age: 29
End: 2024-01-25
Payer: COMMERCIAL

## 2024-01-25 ENCOUNTER — TELEPHONE (OUTPATIENT)
Dept: SURGERY | Facility: CLINIC | Age: 29
End: 2024-01-25
Payer: COMMERCIAL

## 2024-01-25 LAB — BACTERIA UR CULT: NO GROWTH

## 2024-01-25 NOTE — PROGRESS NOTES
"Testing facility ([facility]) called and informed that the testing for Neisseria gonorrhoeae PCR was cancelled due to:    \"Unsatisfactory specimen collection. (Comment: Sample tube does not have any transport/testing media in it. Unable to perform testing on this sample.)  01/25/2024 08:26 AM by Donnell Arzate\"    Please have your care team contact patient to inform. If repeat collection/testing is needed please place a future order.    Thank You,    Dona Cobb  [facility]  P: [phone]   "

## 2024-01-25 NOTE — TELEPHONE ENCOUNTER
Diagnosis, Referred by & from: Internal Hemorrhoids, rectal bleeding   Appt date: 2024   NOTES STATUS DETAILS   OFFICE NOTE from referring provider Internal [facility]:  24 - PCC OV with Dr. Muñoz   OFFICE NOTE from other specialist [facility] / [facility]  23 - UC OV with Dr. Paige  23 - UC OV with PETER Pettit    [facility]:  21 - PCC OV with Dr. Garibay  20 - PCC OV with Dr. Jon    [facility]:  21 - OBGYN OV with Dr. Weiss    [facility]:  18 - GI OV with Merry Antonio   DISCHARGE SUMMARY from hospital N/A    DISCHARGE REPORT from the ER Care Everywhere / Internal [facility]:  21 - ED OV with Dr. Jacobs  21 - ED OV with Dr. Coburn  20 - ED OV with Dr. Neal    [place]:  12/15/18 - ED OV with Dr. Cárdenas   OPERATIVE REPORT Internal [facility]:  10/26/19 - OP Note for  with Dr. Ricardo   MEDICATION LIST Internal    LABS     BIOPSIES/PATHOLOGY RELATED TO DIAGNOSIS Care Everywhere [facility]:  18 - Colon Biopsy (Case: [id number])   DIAGNOSTIC PROCEDURES     COLONOSCOPY (most recent all time after 5 years) Care Everywhere [facility]:  18 - Colonoscopy   IMAGING (DISC & REPORT)      CT Internal [facility]:  23 - CT Abd/Pelvis  22 - CT Abd/Pelvis  21 - CT Abd/Pelvis  21 - CT Abd/Pelvis   ULTRASOUND  (ENDOANAL/ENDORECTAL) Internal [facility]:  21 - US Pelvic  20 - US Abdomen

## 2024-01-25 NOTE — TELEPHONE ENCOUNTER
[facility] Call Center    Phone Message    May a detailed message be left on voicemail: yes     Reason for Call: Other: Please review pt records. Patient is being referred due to Internal hemorrhoid, bleeding and Bright red rectal bleeding. Please review and reach out to patient is needs to be seen sooner.      Action Taken: Message routed to:  [facility] Colon and Rectal    Travel Screening: Not Applicable

## 2024-02-08 ENCOUNTER — MYC MEDICAL ADVICE (OUTPATIENT)
Dept: SURGERY | Facility: CLINIC | Age: 29
End: 2024-02-08
Payer: COMMERCIAL

## 2024-02-28 ENCOUNTER — PRE VISIT (OUTPATIENT)
Dept: SURGERY | Facility: CLINIC | Age: 29
End: 2024-02-28

## 2024-03-01 NOTE — TELEPHONE ENCOUNTER
Diagnosis, Referred by & from: Rectal Bleeding, Hemorrhoids   Appt date: 2024   NOTES STATUS DETAILS   OFFICE NOTE from referring provider Internal [facility]:  24 - PCC OV with Dr. Muñoz   OFFICE NOTE from other specialist Care Everywhere / Internal [facility]:  24 - UC OV with PETER Marquez  23 - UC OV with Dr. Paige  23 - UC OV with PETER Pettit     [facility]:  21 - PCC OV with Dr. Garibay  20 - PCC OV with Dr. Jon     [facility]:  21 - OBGYN OV with Dr. Weiss     [facility]:  18 - GI OV with Merry Antonio   DISCHARGE SUMMARY from hospital N/A    DISCHARGE REPORT from the ER Care Everywhere / Internal [facility]:  21 - ED OV with Dr. Jacobs  21 - ED OV with Dr. Coburn  20 - ED OV with Dr. Neal     [place]:  12/15/18 - ED OV with Dr. Cárdenas   OPERATIVE REPORT Internal [facility]:  10/26/19 - OP Note for  with Dr. Ricardo   MEDICATION LIST Internal    LABS     BIOPSIES/PATHOLOGY RELATED TO DIAGNOSIS Care Everywhere [facility]:  18 - Colon Biopsy (Case: [id number])   DIAGNOSTIC PROCEDURES     COLONOSCOPY (most recent all time after 5 years) Care Everywhere [facility]:  18 - Colonoscopy   IMAGING (DISC & REPORT)      CT Internal [facility]:  23 - CT Abd/Pelvis  22 - CT Abd/Pelvis  21 - CT Abd/Pelvis  21 - CT Abd/Pelvis   ULTRASOUND  (ENDOANAL/ENDORECTAL) Internal [facility]:  21 - US Pelvic  20 - US Abdomen

## 2024-05-13 ENCOUNTER — PRE VISIT (OUTPATIENT)
Dept: SURGERY | Facility: CLINIC | Age: 29
End: 2024-05-13

## 2024-09-07 ENCOUNTER — HEALTH MAINTENANCE LETTER (OUTPATIENT)
Age: 29
End: 2024-09-07

## (undated) DEVICE — LINEN TOWEL PACK X10 5473

## (undated) DEVICE — SU MONOCRYL 3-0 SH 27" Y316H

## (undated) DEVICE — GLOVE PROTEXIS MICRO 6.0  2D73PM60

## (undated) DEVICE — DRSG STERI STRIP 1/2X4" R1547

## (undated) DEVICE — SOLIDIFIER FLUID RED 1500ML LIQUID KIT SYS POWDER ISOB1500

## (undated) DEVICE — BAG CLEAR TRASH 1.3M 39X33" P4040C

## (undated) DEVICE — PAD CHUX UNDERPAD 30X36" P3036C

## (undated) DEVICE — SOL WATER IRRIG 1000ML BOTTLE 2F7114

## (undated) DEVICE — STOCKING SLEEVE VASOPRESS COMPRESSION CALF MED 18" VP501M

## (undated) DEVICE — DRSG TELFA 3X8" 1238

## (undated) DEVICE — GLOVE PROTEXIS POWDER FREE SMT 6.0  2D72PT60X

## (undated) DEVICE — Device

## (undated) DEVICE — SU MONOCRYL 0 CTX 36" Y398H

## (undated) DEVICE — GLOVE PROTEXIS BLUE W/NEU-THERA 6.5  2D73EB65

## (undated) DEVICE — SU PDS II 0 CTX 60" Z990G

## (undated) DEVICE — CAP BABY PINK/BLUE IC-2

## (undated) DEVICE — DRSG TEGADERM 4X10" 1627

## (undated) DEVICE — PACK C-SECTION LF PL15OTA83B

## (undated) DEVICE — GLOVE PROTEXIS BLUE W/NEU-THERA 7.0  2D73EB70

## (undated) DEVICE — BLADE CLIPPER SGL USE 9680

## (undated) DEVICE — SU MONOCRYL 0 CT 36" Y358H

## (undated) DEVICE — LINEN BABY BLANKET 5434

## (undated) DEVICE — TRANSFER DEVICE BLOOD NDL HOLDER 364880

## (undated) DEVICE — BARRIER SEPRAFILM 5X6" SINGLE SHEET 4301-02

## (undated) DEVICE — ESU GROUND PAD ADULT W/CORD E7507

## (undated) DEVICE — LINEN HALF SHEET 5512

## (undated) DEVICE — SOL NACL 0.9% IRRIG 1000ML BOTTLE 2F7124

## (undated) DEVICE — PREP CHLORAPREP 26ML TINTED ORANGE  260815

## (undated) DEVICE — SU MONOCRYL 4-0 PS-2 27" UND Y426H

## (undated) DEVICE — LINEN FULL SHEET 5511

## (undated) DEVICE — LINEN DRAPE 54X72" 5467

## (undated) DEVICE — SUCTION CANISTER MEDIVAC LINER 3000ML W/LID 65651-530

## (undated) RX ORDER — FENTANYL CITRATE 50 UG/ML
INJECTION, SOLUTION INTRAMUSCULAR; INTRAVENOUS
Status: DISPENSED
Start: 2019-10-26

## (undated) RX ORDER — DEXAMETHASONE SODIUM PHOSPHATE 4 MG/ML
INJECTION, SOLUTION INTRA-ARTICULAR; INTRALESIONAL; INTRAMUSCULAR; INTRAVENOUS; SOFT TISSUE
Status: DISPENSED
Start: 2019-10-26

## (undated) RX ORDER — MORPHINE SULFATE 1 MG/ML
INJECTION, SOLUTION EPIDURAL; INTRATHECAL; INTRAVENOUS
Status: DISPENSED
Start: 2019-10-26

## (undated) RX ORDER — OXYTOCIN/0.9 % SODIUM CHLORIDE 30/500 ML
PLASTIC BAG, INJECTION (ML) INTRAVENOUS
Status: DISPENSED
Start: 2019-10-26

## (undated) RX ORDER — PHENYLEPHRINE HCL IN 0.9% NACL 1 MG/10 ML
SYRINGE (ML) INTRAVENOUS
Status: DISPENSED
Start: 2019-10-26

## (undated) RX ORDER — ONDANSETRON 2 MG/ML
INJECTION INTRAMUSCULAR; INTRAVENOUS
Status: DISPENSED
Start: 2019-10-26